# Patient Record
Sex: FEMALE | Race: WHITE | Employment: FULL TIME | URBAN - METROPOLITAN AREA
[De-identification: names, ages, dates, MRNs, and addresses within clinical notes are randomized per-mention and may not be internally consistent; named-entity substitution may affect disease eponyms.]

---

## 2017-04-14 ENCOUNTER — HOSPITAL ENCOUNTER (OUTPATIENT)
Dept: MAMMOGRAPHY | Age: 52
Discharge: HOME | End: 2017-04-14
Attending: OBSTETRICS & GYNECOLOGY
Payer: COMMERCIAL

## 2017-04-14 DIAGNOSIS — Z12.31 VISIT FOR SCREENING MAMMOGRAM: ICD-10-CM

## 2017-04-14 PROCEDURE — 77063 BREAST TOMOSYNTHESIS BI: CPT

## 2018-11-19 ENCOUNTER — HOSPITAL ENCOUNTER (OUTPATIENT)
Dept: MAMMOGRAPHY | Age: 53
Discharge: HOME | End: 2018-11-19
Attending: OBSTETRICS & GYNECOLOGY
Payer: COMMERCIAL

## 2018-11-19 DIAGNOSIS — Z12.39 SCREENING BREAST EXAMINATION: ICD-10-CM

## 2018-11-19 PROCEDURE — 77063 BREAST TOMOSYNTHESIS BI: CPT

## 2018-11-28 ENCOUNTER — HOSPITAL ENCOUNTER (OUTPATIENT)
Dept: MAMMOGRAPHY | Age: 53
Discharge: HOME | End: 2018-11-28
Attending: OBSTETRICS & GYNECOLOGY
Payer: COMMERCIAL

## 2018-11-28 DIAGNOSIS — R92.8 ABNORMAL MAMMOGRAM: ICD-10-CM

## 2018-11-28 PROCEDURE — 77065 DX MAMMO INCL CAD UNI: CPT

## 2018-11-28 PROCEDURE — 76642 ULTRASOUND BREAST LIMITED: CPT

## 2018-12-14 ENCOUNTER — HOSPITAL ENCOUNTER (OUTPATIENT)
Age: 53
Setting detail: OUTPATIENT SURGERY
Discharge: HOME | End: 2018-12-14
Attending: INTERNAL MEDICINE | Admitting: INTERNAL MEDICINE
Payer: COMMERCIAL

## 2018-12-14 ENCOUNTER — ANESTHESIA EVENT (OUTPATIENT)
Dept: ENDOSCOPY | Age: 53
End: 2018-12-14
Payer: COMMERCIAL

## 2018-12-14 ENCOUNTER — ANESTHESIA (OUTPATIENT)
Dept: ENDOSCOPY | Age: 53
End: 2018-12-14
Payer: COMMERCIAL

## 2018-12-14 VITALS
SYSTOLIC BLOOD PRESSURE: 110 MMHG | OXYGEN SATURATION: 100 % | RESPIRATION RATE: 18 BRPM | HEART RATE: 72 BPM | DIASTOLIC BLOOD PRESSURE: 70 MMHG

## 2018-12-14 PROCEDURE — 88305 TISSUE EXAM BY PATHOLOGIST: CPT

## 2018-12-14 PROCEDURE — 76040000019: Performed by: INTERNAL MEDICINE

## 2018-12-14 PROCEDURE — 77030013992 HC SNR POLYP ENDOSC BSC -B: Performed by: INTERNAL MEDICINE

## 2018-12-14 PROCEDURE — 77030027957 HC TBNG IRR ENDOGTR BUSS -B: Performed by: INTERNAL MEDICINE

## 2018-12-14 PROCEDURE — 74011250636 HC RX REV CODE- 250/636

## 2018-12-14 PROCEDURE — 76060000031 HC ANESTHESIA FIRST 0.5 HR: Performed by: INTERNAL MEDICINE

## 2018-12-14 RX ORDER — SODIUM CHLORIDE 0.9 % (FLUSH) 0.9 %
5-10 SYRINGE (ML) INJECTION AS NEEDED
Status: DISCONTINUED | OUTPATIENT
Start: 2018-12-14 | End: 2018-12-14 | Stop reason: HOSPADM

## 2018-12-14 RX ORDER — ALPRAZOLAM 0.25 MG/1
0.25 TABLET ORAL
COMMUNITY

## 2018-12-14 RX ORDER — MIDAZOLAM HYDROCHLORIDE 1 MG/ML
.25-1 INJECTION, SOLUTION INTRAMUSCULAR; INTRAVENOUS
Status: DISCONTINUED | OUTPATIENT
Start: 2018-12-14 | End: 2018-12-14 | Stop reason: HOSPADM

## 2018-12-14 RX ORDER — NALOXONE HYDROCHLORIDE 0.4 MG/ML
0.4 INJECTION, SOLUTION INTRAMUSCULAR; INTRAVENOUS; SUBCUTANEOUS
Status: DISCONTINUED | OUTPATIENT
Start: 2018-12-14 | End: 2018-12-14 | Stop reason: HOSPADM

## 2018-12-14 RX ORDER — FENTANYL CITRATE 50 UG/ML
100 INJECTION, SOLUTION INTRAMUSCULAR; INTRAVENOUS
Status: DISCONTINUED | OUTPATIENT
Start: 2018-12-14 | End: 2018-12-14 | Stop reason: HOSPADM

## 2018-12-14 RX ORDER — EPINEPHRINE 0.1 MG/ML
1 INJECTION INTRACARDIAC; INTRAVENOUS
Status: DISCONTINUED | OUTPATIENT
Start: 2018-12-14 | End: 2018-12-14 | Stop reason: HOSPADM

## 2018-12-14 RX ORDER — SODIUM CHLORIDE 9 MG/ML
50 INJECTION, SOLUTION INTRAVENOUS CONTINUOUS
Status: DISCONTINUED | OUTPATIENT
Start: 2018-12-14 | End: 2018-12-14 | Stop reason: HOSPADM

## 2018-12-14 RX ORDER — ASCORBIC ACID 250 MG
250 TABLET ORAL DAILY
COMMUNITY
End: 2019-05-29

## 2018-12-14 RX ORDER — PROPOFOL 10 MG/ML
INJECTION, EMULSION INTRAVENOUS AS NEEDED
Status: DISCONTINUED | OUTPATIENT
Start: 2018-12-14 | End: 2018-12-14 | Stop reason: HOSPADM

## 2018-12-14 RX ORDER — SODIUM CHLORIDE 0.9 % (FLUSH) 0.9 %
5-10 SYRINGE (ML) INJECTION EVERY 8 HOURS
Status: DISCONTINUED | OUTPATIENT
Start: 2018-12-14 | End: 2018-12-14 | Stop reason: HOSPADM

## 2018-12-14 RX ORDER — ATROPINE SULFATE 0.1 MG/ML
0.5 INJECTION INTRAVENOUS
Status: DISCONTINUED | OUTPATIENT
Start: 2018-12-14 | End: 2018-12-14 | Stop reason: HOSPADM

## 2018-12-14 RX ORDER — SODIUM CHLORIDE 9 MG/ML
INJECTION, SOLUTION INTRAVENOUS
Status: DISCONTINUED | OUTPATIENT
Start: 2018-12-14 | End: 2018-12-14 | Stop reason: HOSPADM

## 2018-12-14 RX ORDER — FLUMAZENIL 0.1 MG/ML
0.2 INJECTION INTRAVENOUS
Status: DISCONTINUED | OUTPATIENT
Start: 2018-12-14 | End: 2018-12-14 | Stop reason: HOSPADM

## 2018-12-14 RX ORDER — DEXTROMETHORPHAN/PSEUDOEPHED 2.5-7.5/.8
1.2 DROPS ORAL
Status: DISCONTINUED | OUTPATIENT
Start: 2018-12-14 | End: 2018-12-14 | Stop reason: HOSPADM

## 2018-12-14 RX ORDER — SPIRONOLACTONE 25 MG/1
TABLET ORAL DAILY
COMMUNITY

## 2018-12-14 RX ADMIN — PROPOFOL 50 MG: 10 INJECTION, EMULSION INTRAVENOUS at 16:26

## 2018-12-14 RX ADMIN — PROPOFOL 50 MG: 10 INJECTION, EMULSION INTRAVENOUS at 16:31

## 2018-12-14 RX ADMIN — PROPOFOL 50 MG: 10 INJECTION, EMULSION INTRAVENOUS at 16:29

## 2018-12-14 RX ADMIN — PROPOFOL 150 MG: 10 INJECTION, EMULSION INTRAVENOUS at 16:23

## 2018-12-14 RX ADMIN — SODIUM CHLORIDE: 9 INJECTION, SOLUTION INTRAVENOUS at 16:00

## 2018-12-14 NOTE — DISCHARGE INSTRUCTIONS
[facility].  217 [facility] [address], [address]  [address]  [id number]  1965    It was my pleasure seeing you for your procedure. You will also receive a summary report with the findings from this procedure and any further recommendations. If you had polyps removed or biopsies taken during your procedure, you will receive a separate letter from me within the next 2 weeks. If you don't receive this letter or if you have any questions, please call my office [phone]. Please take note of the post procedure instructions listed below. Bill Graves,    Dr. Miri Arroyo    These instructions give you information on caring for yourself after your procedure. Call your doctor if you have any problems or questions after your procedure. HOME CARE  · Walk if you have belly cramping or gas. Walking will help get rid of the air and reduce the bloated feeling in your belly (abdomen). · Your IV site (where you received drugs) may be tender to touch. Place warm towels on the site; keep your arm up on two pillows if you have any swelling or soreness in the area. · You may shower. ACTIVITY:  · Take frequent rest periods and move at a slower pace for the next 24 hours. .  · You may resume your regular activity tomorrow if you are feeling back to normal.  · Do not drive or ride a bicycle for at least 24 hours (because of the medicine (anesthesia) used during the test). · Do not sign any important legal documents or use or operate any machinery for 24 hours  · Do not take sleeping medicines/nerve drugs for 24 hours unless the doctor tells you. · You can return to work/school tomorrow unless otherwise instructed. NUTRITION:  · Drink plenty of fluids to keep your pee (urine) clear or pale yellow  · Begin with a light meal and progress to your normal diet.  Heavy or fried foods are harder to digest and may make you feel sick to your stomach (nauseated). · Once you are feeling back to normal, you may resume your normal diet as instructed by your doctor. · Avoid alcoholic beverages for 24 hours or as instructed. IF YOU HAD BIOPSIES TAKEN OR POLYPS REMOVED DURING THE PROCEDURE:  · For the next 7 days, avoid all non-steroidal antiinflammatory medications such as Ibuprofen, Motrin, Advil, Alleve, Kylie-seltzer, Goody's powder, BC powder. · If you do not have an heart condition that requires you to take a daily aspirin, you should avoid taking aspirin for 7 days. · Eat a soft diet for 24 hours. · Monitor your stools for any blood or dark black, tar-like, stools as this may be a sign of bleeding and if you see any blood, notify your doctor immediately. GET HELP RIGHT AWAY AND SEEK IMMEDIATE MEDICAL CARE IF:  · You have more than a spotting of blood in your stool. · You pass clumps of tissue (blood clots) or fill the toilet with blood. · Your belly is painfully swollen or puffy (abdominal distention). · You throw up (vomit). · You have a fever. · You have redness, pain or swelling at the IV site that last greater than two days. · You have abdominal pain or discomfort that is severe or gets worse throughout the day. Post-procedure diagnosis:  Colon Polyp (one) - removed    Post-procedure recommendations:  - Await pathology. You should receive a letter within 2 weeks.    - Resume normal medications.  - Recommend repeat colonoscopy in 5 years

## 2018-12-14 NOTE — PROCEDURES
118 [facility].  217 [facility] [address], [address]  [phone]                              Colonoscopy Procedure Note      Indications:  Screening, average risk. First procedure     :  Brian Calvert MD    Referring Provider: Reyes Sellers MD    Sedation:  MAC anesthesia    Procedure Details:  After informed consent was obtained with all risks and benefits of procedure explained and preoperative exam completed, the patient was taken to the endoscopy suite and placed in the left lateral decubitus position. Upon sequential sedation as per above, a digital rectal exam was performed per below. The Olympus videocolonoscope was inserted in the rectum and carefully advanced to the terminal ileum. The quality of preparation was good. [place] Bowel Prep Score : 3/3/3. The colonoscope was slowly withdrawn with careful evaluation between folds. Retroflexion in the rectum was performed. Findings:   Rectum: normal  Sigmoid: few small diverticula  Descending Colon: few small diverticula  Transverse Colon: normal  Ascending Colon: 8 mm flat polyp in the ascending colon, removed with cold snare  Cecum: normal  Terminal Ileum: normal    Interventions:  polypectomy    Specimen Removed:    ID Type Source Tests Collected by Time Destination   1 : ascending colon polyp Preservative Colon, Ascending  Alycia Goldman MD 12/14/2018 5257 Pathology       Complications: None. EBL:  Minimal    Impression:    See Postoperative diagnosis above    Recommendations:   - Await pathology. You should receive a letter within 2 weeks. - Resume normal medications.  - Recommend repeat colonoscopy in 5 years    Discharge Disposition:  Home in the company of a  when able to ambulate.     Brian Calvert MD  12/14/2018  4:48 PM

## 2018-12-14 NOTE — ANESTHESIA POSTPROCEDURE EVALUATION
Post-Anesthesia Evaluation and Assessment    Patient: Conchita Dickinson MRN: [id number]  SSN: [id number]    YOB: 1965  Age: 52 y.o. Sex: female      I have evaluated the patient and they are stable and ready for discharge from the PACU. Cardiovascular Function/Vital Signs  Visit Vitals  /70   Pulse 72   Resp 18   SpO2 100%   Breastfeeding? No       Patient is status post MAC anesthesia for Procedure(s):  COLONOSCOPY  ENDOSCOPIC POLYPECTOMY. Nausea/Vomiting: None    Postoperative hydration reviewed and adequate. Pain:  Pain Scale 1: Numeric (0 - 10) (12/14/18 1344)  Pain Intensity 1: 0 (12/14/18 1344)   Managed    Neurological Status: At baseline    Mental Status, Level of Consciousness: Alert and  oriented to person, place, and time    Pulmonary Status:   O2 Device: Room air (12/14/18 1710)   Adequate oxygenation and airway patent    Complications related to anesthesia: None    Post-anesthesia assessment completed. No concerns    Signed By: Rocio Saba MD     December 17, 2018              Procedure(s):  COLONOSCOPY  ENDOSCOPIC POLYPECTOMY. Anesthesia Post Evaluation        Patient location during evaluation: PACU  Patient participation: complete - patient participated  Level of consciousness: awake  Pain management: adequate  Airway patency: patent  Anesthetic complications: no  Cardiovascular status: hemodynamically stable  Respiratory status: acceptable  Hydration status: acceptable  Comments: I have seen and evaluated the patient. The patient is ready for PACU discharge. [address], DO                         Visit Vitals  /66   Pulse 73   Resp 18   SpO2 100%   Breastfeeding?  No

## 2019-03-25 ENCOUNTER — OFFICE VISIT (OUTPATIENT)
Dept: ONCOLOGY | Age: 54
End: 2019-03-25

## 2019-03-25 ENCOUNTER — DOCUMENTATION ONLY (OUTPATIENT)
Dept: ONCOLOGY | Age: 54
End: 2019-03-25

## 2019-03-25 VITALS
RESPIRATION RATE: 18 BRPM | WEIGHT: 142.8 LBS | HEIGHT: 66 IN | DIASTOLIC BLOOD PRESSURE: 84 MMHG | BODY MASS INDEX: 22.95 KG/M2 | OXYGEN SATURATION: 99 % | HEART RATE: 73 BPM | TEMPERATURE: 97.1 F | SYSTOLIC BLOOD PRESSURE: 127 MMHG

## 2019-03-25 DIAGNOSIS — C50.511 MALIGNANT NEOPLASM OF LOWER-OUTER QUADRANT OF RIGHT BREAST OF FEMALE, ESTROGEN RECEPTOR POSITIVE (HCC): Primary | ICD-10-CM

## 2019-03-25 DIAGNOSIS — Z17.0 MALIGNANT NEOPLASM OF LOWER-OUTER QUADRANT OF RIGHT BREAST OF FEMALE, ESTROGEN RECEPTOR POSITIVE (HCC): Primary | ICD-10-CM

## 2019-03-25 RX ORDER — LIDOCAINE AND PRILOCAINE 25; 25 MG/G; MG/G
CREAM TOPICAL AS NEEDED
Qty: 30 G | Refills: 0 | Status: ON HOLD | OUTPATIENT
Start: 2019-03-25 | End: 2019-07-19

## 2019-03-25 RX ORDER — PROCHLORPERAZINE MALEATE 10 MG
10 TABLET ORAL
Qty: 50 TAB | Refills: 5 | Status: ON HOLD | OUTPATIENT
Start: 2019-03-25 | End: 2019-07-19

## 2019-03-25 RX ORDER — ONDANSETRON HYDROCHLORIDE 8 MG/1
8 TABLET, FILM COATED ORAL
Qty: 24 TAB | Refills: 3 | Status: ON HOLD | OUTPATIENT
Start: 2019-03-25 | End: 2019-07-19

## 2019-03-25 NOTE — PROGRESS NOTES
3/25/19 5:45 PM: Provided patient with chemotherapy education packet. Packet included a handout on breast cancer diagnosis printed from cancer. net, a handout on Taxol/Herceptin chemotherapy regimen, a Common Side Effects of Chemotherapy handout, and an article on how to safely handle body secretions and waste after chemotherapy printed from [employer]. RN reviewed the packet with the patient and provided opportunity for questions and concerns.

## 2019-03-25 NOTE — PROGRESS NOTES
[facility] [place] [address] [facility] Nip: [phone]  F: [phone]      Reason for Visit:   Aldo Lincoln is a 52 y.o. female who is seen in consultation at the request of Dr. Lynsey Sellers for evaluation of systemic therapy for breast cancer. Consulting physician:  Dr. 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 FH:  Mother with breast cancer at age 45s and 46s; maternal aunt with breast cancer in her 46s; maternal aunt with breast cancer in her 46s; no ovarian, prostate, pancreas cancer    LMP 2018, spotty prior to that    Past Medical History:   Diagnosis Date    Adverse effect of anesthesia     difficulty awakening      Past Surgical History:   Procedure Laterality Date    BREAST SURGERY PROCEDURE UNLISTED Left     lumpectomy no lymph    COLONOSCOPY Left 2018    COLONOSCOPY performed by Nathaniel Valdovinos MD at [facility] ENDOSCOPY    HX ORTHOPAEDIC      foot surgery      Social History     Tobacco Use    Smoking status: Former Smoker     Packs/day: 1.00     Years: 20.00     Pack years: 20.00     Types: Cigarettes     Last attempt to quit: 2004     Years since quittin.9    Smokeless tobacco: Never Used   Substance Use Topics    Alcohol use:  Yes     Alcohol/week: 8.4 oz     Types: 14 Glasses of wine per week      Family History   Problem Relation Age of Onset    Breast Cancer Mother 42's 1st time late 52's 2nd time    Cancer Mother         breast CA x2    Hypertension Mother     Heart Disease Father     Cancer Maternal Aunt         breast    Cancer Maternal Aunt         breast    Cancer Maternal Cousin         Breast     Current Outpatient Medications   Medication Sig    TURMERIC PO Take 2 Caps by mouth daily.  spironolactone (ALDACTONE) 25 mg tablet Take  by mouth daily.  vit B Cmplx 3-FA-Vit C-Biotin (NEPHRO VINOD RX) 1- mg-mg-mcg tablet Take 1 Tab by mouth daily.  omega 3-dha-epa-fish oil (FISH OIL) 100-160-1,000 mg cap Take 1 Cap by mouth daily.  ascorbic acid, vitamin C, (VITAMIN C) 250 mg tablet Take 250 mg by mouth daily.  ALPRAZolam (XANAX) 0.25 mg tablet Take 0.25 mg by mouth. No current facility-administered medications for this visit. Allergies   Allergen Reactions    Penicillins Hives        Review of Systems: A complete review of systems was obtained, negative except as described above. Physical Exam:     Visit Vitals  /84   Pulse 73   Temp 97.1 °F (36.2 °C) (Temporal)   Resp 18   Ht 5' 6\" (1.676 m)   Wt 142 lb 12.8 oz (64.8 kg)   LMP 11/01/2018   SpO2 99%   BMI 23.05 kg/m²     ECOG PS: 0  General: No distress  Respiratory: Normal respiratory effort  CV: No peripheral edema  Skin: No rashes, ecchymoses, or petechiae  Psych: Alert, oriented, normal mood/affect      Results:   No results found for: WBC, HGB, HCT, PLT, MCV, ANEU, HGBPOC, HCTPOC, HGBEXT, HCTEXT, PLTEXT, HGBEXT, HCTEXT, PLTEXT  No results found for: NA, K, CL, CO2, GLU, BUN, CREA, GFRAA, GFRNA, CA, NAPOC, KPOCT, CLPOC, GLUCPOC, IBUN, CREAPOC, ICAI  No results found for: TBILI, ALT, SGOT, AP, TP, ALB, GLOB      Records reviewed and summarized above. Pathology report(s) reviewed above. Radiology report(s) reviewed above. Assessment/plan:   1. Right LOQ IDC, 1.4 cm, gr 2, 0/2 LN, ER +, VT +, HER 2 POSITIVE:  Stage IA (both anatomic and prognostic).   Likely postmenopausal    We explained to the patient that the goal of systemic adjuvant therapy is to improve the chances for cure and decrease the risk of relapse. We explained why a patient can have microscopic cancer spread now even though physical examination, laboratory studies and imaging studies are negative for cancer. We explained that the same treatments used now as adjuvant or preventive treatments rarely if ever are curative in women who develop metastases. Willy Adams suggests equivalency between q.3 week Adriamycin, Cytoxan followed by weekly paclitaxel and trastuzumab compared with the [facility] regimen. However, this study was not powered to show a difference between these two regimens, and in the [facility] publication, the AC-TH arm showed a numerically advantange (though not statistically significant) to the [facility] arm. In this patient, it is completely reasonable to use [facility] approach and avoid the potential cardiotoxicity of the anthracyclines as well as the potential for leukemia. We discussed the toxicities of docetaxel and carboplatin chemotherapy in detail. This chemotherapy frequently causes a low white blood cell count and hospital admissions for treatment of neutropenic fever. We explained that we consider the use of growth factors to minimize this risk. We explained to the patient that some side effects if they occur only last a few days including nausea, vomiting, stomatitis, arthralgia, myalgia,and allergic reactions to Taxotere. We told the patient that severe nausea and vomiting were uncommon and that some side effects,if they occur, will last longer; this includes hair loss, which will be seen in all patients treated with these agents and fatigue,which will be seen in most.  We also informed that for the patient that heart damage is rare with these agents. We explained that carboplatin can rarely cause kidney damage and high frequency hearing loss.   We provided the patient in detail her information concerning the toxicities of this regimen in addition to her overall discussion. Rationale for therapy with trastuzumab was also discussed with the patient including a 50% proportional improvement in disease free survival and also an improvement in overall survival in patients receiving trastuzumab and chemotherapy for HER-2 positive breast cancer. The side effects of trastuzumab were discussed including a 4%-5% risk of dropping her ejection fraction while on treatment and about a 1% risk of CHF. We discussed that this drug will be used every 3 weeks for remainder of a year following the chemotherapy cycles. We will check her EF before chemotherapy and every 3 months while she is receiving trastuzumab. · TCH (Trastuzumab 8mg/kg load with cycle 1 then 6mg/kg, Docetaxel 75mg/m2, Carboplatin AUC 6) given every 3 weeks x 6 cycles  · Labs: CBC, CMP prior to each treatment  · Antiemetic Prophylaxis: Palonosetron and dexamethasone prior to chemo  · PRN Antiemetics: Ondansetron, Compazine  · Swelling prophylaxis: Dexmethasone 8mg bid the day before, and day after chemotherapy  · TTE prior to chemotherapy and every 12 weeks while on Trastuzumab  · Neulasta 24-72 hours after each treatment    Also discussed the APT study results, weekly paclitaxel 80 mg/m2 x 12 with weekly trastuzumab (4 mg/kg load then 2 mg/kg)  followed by outback trastuzumab to complete one year. 42% were pT1c. I discussed the potential risks of paclitaxel chemotherapy with the patient. Major toxicities include nausea and vomiting, stomatitis, fatigue. We provided the patient with detailed information concerning toxicity including frequent toxicities that only last a few days, such as nausea, vomiting, mouth sores, arthralgia, myalgia, and potentially allergic reactions to paclitaxel, as well as toxicities which can be longer lasting including total alopecia, fatigue, anemia and neuropathy.  We provided the patient with detailed information concerning the toxicities of their regimen in addition to our verbal discussion. This is a reasonable regimen in this setting (only 4 distant recurrences over 7 years in this study, 7 year DFS was 95%)     After this discussion, she is agreeable to paclitaxel and trastuzumab as in APT, will start on 4/22. Will have her sign informed consent at the next visit. TTE ordered, she will need a port. The patient was given the following prescriptions with written and verbal instructions on how to use each:  Compazine, zofran,  a wig, and emla cream.      Discussed cold caps and cryotherapy with paclitaxel. She will not require XRT. Discussed endocrine therapy. The risks and benefits of tamoxifen were discussed in detail and the patient was informed of the following: Risks include a 1% risk of endometrial cancer for postmenopausal women treated for five years but no (or a minimally increased) risk in premenopausal women and that most women who develop tamoxifen-associated endometrial cancer can be cured. Any bleeding in a postmenopausal woman should be reported to a health care professional. There is also a 1% risk of blood clots (thromboembolism) that can be fatal. All patients irrespective of age who take tamoxifen and who have not had a hysterectomy should have a pelvic exam and Pap smear yearly. Tamoxifen increases the risk of cataract formation and on rare occasions has caused retinal damage: an eye exam is recommended yearly. Other risks include vaginal discharge or dryness, the development or worsening of hot flashes or vasomotor symptoms, and bone loss in premenopausal women. There is excellent evidence that tamoxifen does not increase risk of depression, cause weight gain or have a major effect on sexual function. Available data suggests little or no effect on cognitive function. Benefits include a lowering of cholesterol and a reduction in the rate of bone loss for postmenopausal woman. Any other symptoms should be reported. The risks and benefits of aromatase inhibitors (anastrozole, letrozole, and exemestane) were discussed in detail and the patient was informed of the following: Risks include the development of painful muscles and joints (arthralgia/myalgia) and bone loss. Muscle and joint pain can be severe but rarely result in any tissue damage; symptoms usually resolve in several weeks when the medication is stopped. Bone loss is common and a bone density test is recommended as a baseline and then yearly to every several years depending on initial results. The risk of fractures is increased by a few percent in patients taking these drugs, but careful monitoring of bone density and using bone protecting agents when indicated can minimize these risks. Unlike tamoxifen there is no increased risk of blood clots or endometrial cancer. AIs can cause or worsen vaginal dryness but women using these drugs should not use vaginal estrogen preparations for these symptoms. AIs can also cause or increase hot flashes. Any other symptoms should be reported. Will make a final decision on endocrine therapy following chemotherapy. May need to check LH, FSH, estradiol, though likely postmenopausal    Follow-up after early breast cancer was discussed. I recommend follow-up as defined by the American Society of Clinical Oncology and [facility]. This includes a visit to a health care professional every 3-6 months for 3 years, then every 6-12 months for 2 years, and then yearly as well as mammograms yearly. 2. Emotional well being:  She has excellent support and is coping well with her disease    > 80 min were spent with this patient with > 50% of that time spent in face to face counseling. I appreciate the opportunity to participate in Ms. Caity Moctezuma's care. Signed By: Paulino Brewster MD      No orders of the defined types were placed in this encounter.

## 2019-03-25 NOTE — PATIENT INSTRUCTIONS
Common Side Effects of Chemotherapy  Decreased Blood Counts Your blood counts can decrease temporarily due to chemotherapy, they will recover over time. This is an expected side effect that your Doctor will be monitoring.  - If you experience fevers (temperature >100.4°F), bleeding or unexplained bruising, please call the office right away   Risk of Infection Your white blood cells can decrease temporarily due to chemotherapy and can put you at higher risk of infection. Washing hands frequently with soap and avoiding sick contacts can reduce your risk of infection.  - If you experience fevers (temperature >100.4°F), shaking chills, or any signs of infection, please call the office immediately   Anemia Chemotherapy can cause your red blood cells to temporarily decrease; this is an expected side effect that your Doctor will be monitoring.  - You may experience fatigue if this occurs, please notify the office if you experience bleeding, shortness of breath with minimal exertion or at rest, rapid heartbeat, or feeling as though you may lose consciousness. Hair Loss Chemotherapy can affect your hair follicles and cause you to lose hair. This can occur on your scalp hair but also all over your body including eyebrows and eye lashes   Nausea  You have been prescribed nausea medication to take if needed. Please follow the directions given to you by your Doctor. - Please call the office if the medications you have been given are not relieving nausea. Vomiting Make sure you are taking anti-nausea medication as prescribed. Eating small amounts of bland foods frequently can help. - Please call the office right away if you are vomiting more than 4 times per day or are unable to keep down food or fluids   Diarrhea Eating small amounts of bland foods frequently can help, increase your fluid intake. It is usually ok to take Imodium for diarrhea.   - Please call the office right away if you experience more than 4 episodes of watery diarrhea or if you are feeling dehydrated. Female patients of childbearing age need to avoid pregnancy during chemotherapy. You can reach Medical Oncology at [address] [place] [address] with further questions or concerns at: [phone].  - Calls during normal business hours will reach our office.  - Calls after hours or on the weekend will reach an answering service and the on-call Oncologist will return your call. Prognosis: Good     This is our best current assessment. Cancers respond differently to treatment. Overall prognosis depends on many factors including other conditions, cancer stage, side effects, and other unforeseen events. Goal of therapy: Curative     Expected response to treatment:  Very good: Anticipate remission (no sign of cancer) and possible cancer cure    Treatment benefits and harms:  We discussed potential short term side effects to include:see handout    Long term side effects of treatment:  see handout    Quality of life: Quality of life concerns have been addressed. Treatment as outlined is expected to have minimal impact on patients quality of life.      Echo for heart    Prescriptions:  compazine; ondansetron; emla cream , wig

## 2019-03-26 ENCOUNTER — TELEPHONE (OUTPATIENT)
Dept: CARDIOLOGY CLINIC | Age: 54
End: 2019-03-26

## 2019-03-26 DIAGNOSIS — Z79.899 ENCOUNTER FOR MONITORING CARDIOTOXIC DRUG THERAPY: ICD-10-CM

## 2019-03-26 DIAGNOSIS — Z51.81 ENCOUNTER FOR MONITORING CARDIOTOXIC DRUG THERAPY: ICD-10-CM

## 2019-03-26 DIAGNOSIS — Z17.0 MALIGNANT NEOPLASM OF RIGHT BREAST IN FEMALE, ESTROGEN RECEPTOR POSITIVE, UNSPECIFIED SITE OF BREAST (HCC): Primary | ICD-10-CM

## 2019-03-26 DIAGNOSIS — C50.911 MALIGNANT NEOPLASM OF RIGHT BREAST IN FEMALE, ESTROGEN RECEPTOR POSITIVE, UNSPECIFIED SITE OF BREAST (HCC): Primary | ICD-10-CM

## 2019-03-26 NOTE — PROGRESS NOTES
Oncology Navigator Psychosocial AssessmentReason for Assessment:   
[]Depression  []Anxiety  []Caregiver [place]  []Maladaptive Coping with Serious Illness   [x]Other: newly dx: breast cancer Sources of Information:   
[x]Patient  [x]Family  [x]Staff  [x]Medical Record Advance Care Planning: No flowsheet data found. Mental Status:   
[x]Alert  []Lethargic  []Unresponsive Oriented to:  [x]Person  [x]Place  [x]Time  [x]Situation Barriers to Learning:   
[]Language  []Developmental  []Cognitive  []Altered Mental Status  []Visual/Hearing Impairment  []Unable to Read/Write  []Motivational   [x]No Barriers Identified  []Other: 
 
Relationship Status: 
[x]Single  []  []Significant Other/Life Partner  []  []  [] Living Circumstances: 
[x]Lives Alone  []Family/Significant Other in Household  []Roommates  []Children in the Home  []Paid Caregivers  []Assisted Living Facility/Group Home  []Skilled [address]  []Homeless  []Incarcerated  []Environmental/Care Concerns  []Other: 
 
Support System:   
[]Strong  [x]Fair  []Limited Financial/Legal Concerns:   
[]Uninsured  []Limited Income/Resources  []Non-Citizen  [x]No Concerns Identified  []Financial POA:   
[]Other: 
 
Latter-day/Spiritual/Existential: 
[]Strong Sense of Spirituality  []Involved in [facility] []Request  Visit  []Expressing Spiritual/Existential Angst  [x]No Concerns Identified Coping with Illness:       
 Patient: Family/Caregiver:  
Understanding and Acceptance of Illness/Prognosis  [x] [x] Strong Sense of Resilience [] []  
Self Reflection [] [] Engaged Support System [x] [] Does not Readily Discuss Illness [] [] Denial of Terminal Status [] [] Anger [] [] Depression [] [] Anxiety/Fear [] []  
Bargaining [] [] Recent Diagnosis/Prognosis [x] [] Difficulties with Body Image [] [] Loss of Identity [] [] Excessive Substance Use [] [] Mental Health History [] [] Enmeshed Relationships [] [] History of Loss [] [] Anticipatory Grief [] [] Concern for Complicated Grief [] [] Suicidal Ideation or Plan [] [] Unable to assess [] []  
 
            
Narrative: Met with patient to introduce social work navigator role and supports. Patient here with her niece, Donald Longo. Patient presents as pleasant and engaged. 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 Pt has limited local support. Nicole (sister) and Donald Longo (niece) both live out of state. Patient identified her cousin, Snehal Blevins as a local support. She tells me she also has neighbors and friends who are able to provide practical and emotional support. Offered active listening as patient ventilates feelings regarding diagnosis and treatment. She reports disappointment with needing chemotherapy but that she is \"onboard with what is recommended\". Patient is actively coping with diagnosis. Reviewed barriers to care and none identified at his time. Patient has transportation to appointment, short-term disability, health insurance. Provided information on supportive resources offered at [facility] and [facility]. Encouraged patient to contact me as needed. Referrals:  
 
I. Transportation Medicaid (Margarita Jones) [] [facility] [] Regional organization  [] Financial Assistance/Medication Access Patient assistance program (Care Card) [] Co-pay assistance  [] Leukemia & Lymphoma Society [] [address]  [] Patient [address] [] CancerCare  [] Emotional support Peer support group [] Local counseling [] Online support group [] Coordination of psychiatry consult [] Goals/Plan:  
Ongoing psychosocial support through counseling, assessment, and links to appropriate resources as needed. Thank you, Leoncio Ruth, MSW 
 
 This note will not be viewable in [address]

## 2019-03-28 RX ORDER — ALBUTEROL SULFATE 0.83 MG/ML
2.5 SOLUTION RESPIRATORY (INHALATION) AS NEEDED
Status: CANCELLED
Start: 2019-06-26

## 2019-03-28 RX ORDER — DIPHENHYDRAMINE HYDROCHLORIDE 50 MG/ML
50 INJECTION, SOLUTION INTRAMUSCULAR; INTRAVENOUS AS NEEDED
Status: CANCELLED
Start: 2019-05-29

## 2019-03-28 RX ORDER — ONDANSETRON 2 MG/ML
8 INJECTION INTRAMUSCULAR; INTRAVENOUS AS NEEDED
Status: CANCELLED | OUTPATIENT
Start: 2019-05-29

## 2019-03-28 RX ORDER — HEPARIN 100 UNIT/ML
300-500 SYRINGE INTRAVENOUS AS NEEDED
Status: CANCELLED
Start: 2019-04-22

## 2019-03-28 RX ORDER — DIPHENHYDRAMINE HYDROCHLORIDE 50 MG/ML
50 INJECTION, SOLUTION INTRAMUSCULAR; INTRAVENOUS AS NEEDED
Status: CANCELLED
Start: 2019-07-10

## 2019-03-28 RX ORDER — DEXAMETHASONE SODIUM PHOSPHATE 4 MG/ML
10 INJECTION, SOLUTION INTRA-ARTICULAR; INTRALESIONAL; INTRAMUSCULAR; INTRAVENOUS; SOFT TISSUE ONCE
Status: CANCELLED | OUTPATIENT
Start: 2019-07-10

## 2019-03-28 RX ORDER — SODIUM CHLORIDE 9 MG/ML
25 INJECTION, SOLUTION INTRAVENOUS CONTINUOUS
Status: CANCELLED | OUTPATIENT
Start: 2019-06-05

## 2019-03-28 RX ORDER — DIPHENHYDRAMINE HYDROCHLORIDE 50 MG/ML
50 INJECTION, SOLUTION INTRAMUSCULAR; INTRAVENOUS AS NEEDED
Status: CANCELLED
Start: 2019-05-22

## 2019-03-28 RX ORDER — SODIUM CHLORIDE 0.9 % (FLUSH) 0.9 %
10 SYRINGE (ML) INJECTION AS NEEDED
Status: CANCELLED
Start: 2019-06-05

## 2019-03-28 RX ORDER — EPINEPHRINE 1 MG/ML
0.3 INJECTION, SOLUTION, CONCENTRATE INTRAVENOUS AS NEEDED
Status: CANCELLED | OUTPATIENT
Start: 2019-04-22

## 2019-03-28 RX ORDER — DIPHENHYDRAMINE HYDROCHLORIDE 50 MG/ML
50 INJECTION, SOLUTION INTRAMUSCULAR; INTRAVENOUS AS NEEDED
Status: CANCELLED
Start: 2019-06-12

## 2019-03-28 RX ORDER — ACETAMINOPHEN 325 MG/1
650 TABLET ORAL AS NEEDED
Status: CANCELLED
Start: 2019-06-12

## 2019-03-28 RX ORDER — SODIUM CHLORIDE 0.9 % (FLUSH) 0.9 %
10 SYRINGE (ML) INJECTION AS NEEDED
Status: CANCELLED
Start: 2019-05-15

## 2019-03-28 RX ORDER — SODIUM CHLORIDE 9 MG/ML
10 INJECTION INTRAMUSCULAR; INTRAVENOUS; SUBCUTANEOUS AS NEEDED
Status: CANCELLED | OUTPATIENT
Start: 2019-05-22

## 2019-03-28 RX ORDER — HEPARIN 100 UNIT/ML
300-500 SYRINGE INTRAVENOUS AS NEEDED
Status: CANCELLED
Start: 2019-06-05

## 2019-03-28 RX ORDER — DEXAMETHASONE SODIUM PHOSPHATE 4 MG/ML
10 INJECTION, SOLUTION INTRA-ARTICULAR; INTRALESIONAL; INTRAMUSCULAR; INTRAVENOUS; SOFT TISSUE ONCE
Status: CANCELLED | OUTPATIENT
Start: 2019-05-29

## 2019-03-28 RX ORDER — HYDROCORTISONE SODIUM SUCCINATE 100 MG/2ML
100 INJECTION, POWDER, FOR SOLUTION INTRAMUSCULAR; INTRAVENOUS AS NEEDED
Status: CANCELLED | OUTPATIENT
Start: 2019-06-26

## 2019-03-28 RX ORDER — SODIUM CHLORIDE 9 MG/ML
25 INJECTION, SOLUTION INTRAVENOUS CONTINUOUS
Status: CANCELLED | OUTPATIENT
Start: 2019-05-22

## 2019-03-28 RX ORDER — SODIUM CHLORIDE 0.9 % (FLUSH) 0.9 %
10 SYRINGE (ML) INJECTION AS NEEDED
Status: CANCELLED
Start: 2019-07-03

## 2019-03-28 RX ORDER — EPINEPHRINE 1 MG/ML
0.3 INJECTION, SOLUTION, CONCENTRATE INTRAVENOUS AS NEEDED
Status: CANCELLED | OUTPATIENT
Start: 2019-06-05

## 2019-03-28 RX ORDER — EPINEPHRINE 1 MG/ML
0.3 INJECTION, SOLUTION, CONCENTRATE INTRAVENOUS AS NEEDED
Status: CANCELLED | OUTPATIENT
Start: 2019-06-12

## 2019-03-28 RX ORDER — SODIUM CHLORIDE 9 MG/ML
25 INJECTION, SOLUTION INTRAVENOUS CONTINUOUS
Status: CANCELLED | OUTPATIENT
Start: 2019-07-10

## 2019-03-28 RX ORDER — ONDANSETRON 2 MG/ML
8 INJECTION INTRAMUSCULAR; INTRAVENOUS AS NEEDED
Status: CANCELLED | OUTPATIENT
Start: 2019-06-05

## 2019-03-28 RX ORDER — SODIUM CHLORIDE 0.9 % (FLUSH) 0.9 %
10 SYRINGE (ML) INJECTION AS NEEDED
Status: CANCELLED
Start: 2019-05-29

## 2019-03-28 RX ORDER — DIPHENHYDRAMINE HYDROCHLORIDE 50 MG/ML
50 INJECTION, SOLUTION INTRAMUSCULAR; INTRAVENOUS ONCE
Status: CANCELLED
Start: 2019-05-08

## 2019-03-28 RX ORDER — ALBUTEROL SULFATE 0.83 MG/ML
2.5 SOLUTION RESPIRATORY (INHALATION) AS NEEDED
Status: CANCELLED
Start: 2019-05-08

## 2019-03-28 RX ORDER — DEXAMETHASONE SODIUM PHOSPHATE 4 MG/ML
10 INJECTION, SOLUTION INTRA-ARTICULAR; INTRALESIONAL; INTRAMUSCULAR; INTRAVENOUS; SOFT TISSUE ONCE
Status: CANCELLED | OUTPATIENT
Start: 2019-05-08

## 2019-03-28 RX ORDER — HYDROCORTISONE SODIUM SUCCINATE 100 MG/2ML
100 INJECTION, POWDER, FOR SOLUTION INTRAMUSCULAR; INTRAVENOUS AS NEEDED
Status: CANCELLED | OUTPATIENT
Start: 2019-05-15

## 2019-03-28 RX ORDER — EPINEPHRINE 1 MG/ML
0.3 INJECTION, SOLUTION, CONCENTRATE INTRAVENOUS AS NEEDED
Status: CANCELLED | OUTPATIENT
Start: 2019-06-19

## 2019-03-28 RX ORDER — SODIUM CHLORIDE 9 MG/ML
25 INJECTION, SOLUTION INTRAVENOUS CONTINUOUS
Status: CANCELLED | OUTPATIENT
Start: 2019-07-03

## 2019-03-28 RX ORDER — ONDANSETRON 2 MG/ML
8 INJECTION INTRAMUSCULAR; INTRAVENOUS AS NEEDED
Status: CANCELLED | OUTPATIENT
Start: 2019-06-12

## 2019-03-28 RX ORDER — SODIUM CHLORIDE 9 MG/ML
10 INJECTION INTRAMUSCULAR; INTRAVENOUS; SUBCUTANEOUS AS NEEDED
Status: CANCELLED | OUTPATIENT
Start: 2019-05-15

## 2019-03-28 RX ORDER — SODIUM CHLORIDE 0.9 % (FLUSH) 0.9 %
10 SYRINGE (ML) INJECTION AS NEEDED
Status: CANCELLED
Start: 2019-05-22

## 2019-03-28 RX ORDER — SODIUM CHLORIDE 9 MG/ML
10 INJECTION INTRAMUSCULAR; INTRAVENOUS; SUBCUTANEOUS AS NEEDED
Status: CANCELLED | OUTPATIENT
Start: 2019-05-08

## 2019-03-28 RX ORDER — DIPHENHYDRAMINE HYDROCHLORIDE 50 MG/ML
50 INJECTION, SOLUTION INTRAMUSCULAR; INTRAVENOUS AS NEEDED
Status: CANCELLED
Start: 2019-04-22

## 2019-03-28 RX ORDER — SODIUM CHLORIDE 0.9 % (FLUSH) 0.9 %
10 SYRINGE (ML) INJECTION AS NEEDED
Status: CANCELLED
Start: 2019-06-12

## 2019-03-28 RX ORDER — DEXAMETHASONE SODIUM PHOSPHATE 4 MG/ML
10 INJECTION, SOLUTION INTRA-ARTICULAR; INTRALESIONAL; INTRAMUSCULAR; INTRAVENOUS; SOFT TISSUE ONCE
Status: CANCELLED | OUTPATIENT
Start: 2019-06-12

## 2019-03-28 RX ORDER — ACETAMINOPHEN 325 MG/1
650 TABLET ORAL AS NEEDED
Status: CANCELLED
Start: 2019-07-03

## 2019-03-28 RX ORDER — DIPHENHYDRAMINE HYDROCHLORIDE 50 MG/ML
50 INJECTION, SOLUTION INTRAMUSCULAR; INTRAVENOUS ONCE
Status: CANCELLED
Start: 2019-05-29

## 2019-03-28 RX ORDER — ALBUTEROL SULFATE 0.83 MG/ML
2.5 SOLUTION RESPIRATORY (INHALATION) AS NEEDED
Status: CANCELLED
Start: 2019-07-10

## 2019-03-28 RX ORDER — DIPHENHYDRAMINE HYDROCHLORIDE 50 MG/ML
50 INJECTION, SOLUTION INTRAMUSCULAR; INTRAVENOUS AS NEEDED
Status: CANCELLED
Start: 2019-06-05

## 2019-03-28 RX ORDER — DIPHENHYDRAMINE HYDROCHLORIDE 50 MG/ML
50 INJECTION, SOLUTION INTRAMUSCULAR; INTRAVENOUS AS NEEDED
Status: CANCELLED
Start: 2019-07-03

## 2019-03-28 RX ORDER — ALBUTEROL SULFATE 0.83 MG/ML
2.5 SOLUTION RESPIRATORY (INHALATION) AS NEEDED
Status: CANCELLED
Start: 2019-05-15

## 2019-03-28 RX ORDER — DEXAMETHASONE SODIUM PHOSPHATE 4 MG/ML
10 INJECTION, SOLUTION INTRA-ARTICULAR; INTRALESIONAL; INTRAMUSCULAR; INTRAVENOUS; SOFT TISSUE ONCE
Status: CANCELLED | OUTPATIENT
Start: 2019-06-05

## 2019-03-28 RX ORDER — DIPHENHYDRAMINE HYDROCHLORIDE 50 MG/ML
50 INJECTION, SOLUTION INTRAMUSCULAR; INTRAVENOUS AS NEEDED
Status: CANCELLED
Start: 2019-05-08

## 2019-03-28 RX ORDER — EPINEPHRINE 1 MG/ML
0.3 INJECTION, SOLUTION, CONCENTRATE INTRAVENOUS AS NEEDED
Status: CANCELLED | OUTPATIENT
Start: 2019-05-22

## 2019-03-28 RX ORDER — ALBUTEROL SULFATE 0.83 MG/ML
2.5 SOLUTION RESPIRATORY (INHALATION) AS NEEDED
Status: CANCELLED
Start: 2019-05-01

## 2019-03-28 RX ORDER — HEPARIN 100 UNIT/ML
300-500 SYRINGE INTRAVENOUS AS NEEDED
Status: CANCELLED
Start: 2019-07-10

## 2019-03-28 RX ORDER — SODIUM CHLORIDE 9 MG/ML
10 INJECTION INTRAMUSCULAR; INTRAVENOUS; SUBCUTANEOUS AS NEEDED
Status: CANCELLED | OUTPATIENT
Start: 2019-06-26

## 2019-03-28 RX ORDER — SODIUM CHLORIDE 9 MG/ML
25 INJECTION, SOLUTION INTRAVENOUS CONTINUOUS
Status: CANCELLED | OUTPATIENT
Start: 2019-05-15

## 2019-03-28 RX ORDER — DIPHENHYDRAMINE HYDROCHLORIDE 50 MG/ML
50 INJECTION, SOLUTION INTRAMUSCULAR; INTRAVENOUS AS NEEDED
Status: CANCELLED
Start: 2019-06-19

## 2019-03-28 RX ORDER — EPINEPHRINE 1 MG/ML
0.3 INJECTION, SOLUTION, CONCENTRATE INTRAVENOUS AS NEEDED
Status: CANCELLED | OUTPATIENT
Start: 2019-07-10

## 2019-03-28 RX ORDER — SODIUM CHLORIDE 9 MG/ML
10 INJECTION INTRAMUSCULAR; INTRAVENOUS; SUBCUTANEOUS AS NEEDED
Status: CANCELLED | OUTPATIENT
Start: 2019-06-05

## 2019-03-28 RX ORDER — HYDROCORTISONE SODIUM SUCCINATE 100 MG/2ML
100 INJECTION, POWDER, FOR SOLUTION INTRAMUSCULAR; INTRAVENOUS AS NEEDED
Status: CANCELLED | OUTPATIENT
Start: 2019-07-03

## 2019-03-28 RX ORDER — HYDROCORTISONE SODIUM SUCCINATE 100 MG/2ML
100 INJECTION, POWDER, FOR SOLUTION INTRAMUSCULAR; INTRAVENOUS AS NEEDED
Status: CANCELLED | OUTPATIENT
Start: 2019-07-10

## 2019-03-28 RX ORDER — DIPHENHYDRAMINE HYDROCHLORIDE 50 MG/ML
50 INJECTION, SOLUTION INTRAMUSCULAR; INTRAVENOUS AS NEEDED
Status: CANCELLED
Start: 2019-06-26

## 2019-03-28 RX ORDER — HYDROCORTISONE SODIUM SUCCINATE 100 MG/2ML
100 INJECTION, POWDER, FOR SOLUTION INTRAMUSCULAR; INTRAVENOUS AS NEEDED
Status: CANCELLED | OUTPATIENT
Start: 2019-05-08

## 2019-03-28 RX ORDER — SODIUM CHLORIDE 9 MG/ML
10 INJECTION INTRAMUSCULAR; INTRAVENOUS; SUBCUTANEOUS AS NEEDED
Status: CANCELLED | OUTPATIENT
Start: 2019-06-12

## 2019-03-28 RX ORDER — ONDANSETRON 2 MG/ML
8 INJECTION INTRAMUSCULAR; INTRAVENOUS AS NEEDED
Status: CANCELLED | OUTPATIENT
Start: 2019-04-22

## 2019-03-28 RX ORDER — ALBUTEROL SULFATE 0.83 MG/ML
2.5 SOLUTION RESPIRATORY (INHALATION) AS NEEDED
Status: CANCELLED
Start: 2019-05-22

## 2019-03-28 RX ORDER — DEXAMETHASONE SODIUM PHOSPHATE 4 MG/ML
10 INJECTION, SOLUTION INTRA-ARTICULAR; INTRALESIONAL; INTRAMUSCULAR; INTRAVENOUS; SOFT TISSUE ONCE
Status: CANCELLED | OUTPATIENT
Start: 2019-05-15

## 2019-03-28 RX ORDER — ONDANSETRON 2 MG/ML
8 INJECTION INTRAMUSCULAR; INTRAVENOUS AS NEEDED
Status: CANCELLED | OUTPATIENT
Start: 2019-05-01

## 2019-03-28 RX ORDER — HYDROCORTISONE SODIUM SUCCINATE 100 MG/2ML
100 INJECTION, POWDER, FOR SOLUTION INTRAMUSCULAR; INTRAVENOUS AS NEEDED
Status: CANCELLED | OUTPATIENT
Start: 2019-06-05

## 2019-03-28 RX ORDER — ACETAMINOPHEN 325 MG/1
650 TABLET ORAL AS NEEDED
Status: CANCELLED
Start: 2019-06-19

## 2019-03-28 RX ORDER — ALBUTEROL SULFATE 0.83 MG/ML
2.5 SOLUTION RESPIRATORY (INHALATION) AS NEEDED
Status: CANCELLED
Start: 2019-06-12

## 2019-03-28 RX ORDER — ACETAMINOPHEN 325 MG/1
650 TABLET ORAL AS NEEDED
Status: CANCELLED
Start: 2019-05-15

## 2019-03-28 RX ORDER — HYDROCORTISONE SODIUM SUCCINATE 100 MG/2ML
100 INJECTION, POWDER, FOR SOLUTION INTRAMUSCULAR; INTRAVENOUS AS NEEDED
Status: CANCELLED | OUTPATIENT
Start: 2019-06-19

## 2019-03-28 RX ORDER — HEPARIN 100 UNIT/ML
300-500 SYRINGE INTRAVENOUS AS NEEDED
Status: CANCELLED
Start: 2019-07-03

## 2019-03-28 RX ORDER — DEXAMETHASONE SODIUM PHOSPHATE 4 MG/ML
10 INJECTION, SOLUTION INTRA-ARTICULAR; INTRALESIONAL; INTRAMUSCULAR; INTRAVENOUS; SOFT TISSUE ONCE
Status: CANCELLED | OUTPATIENT
Start: 2019-07-03

## 2019-03-28 RX ORDER — SODIUM CHLORIDE 9 MG/ML
10 INJECTION INTRAMUSCULAR; INTRAVENOUS; SUBCUTANEOUS AS NEEDED
Status: CANCELLED | OUTPATIENT
Start: 2019-06-19

## 2019-03-28 RX ORDER — DEXAMETHASONE SODIUM PHOSPHATE 4 MG/ML
10 INJECTION, SOLUTION INTRA-ARTICULAR; INTRALESIONAL; INTRAMUSCULAR; INTRAVENOUS; SOFT TISSUE ONCE
Status: CANCELLED | OUTPATIENT
Start: 2019-05-01

## 2019-03-28 RX ORDER — EPINEPHRINE 1 MG/ML
0.3 INJECTION, SOLUTION, CONCENTRATE INTRAVENOUS AS NEEDED
Status: CANCELLED | OUTPATIENT
Start: 2019-05-08

## 2019-03-28 RX ORDER — DEXAMETHASONE SODIUM PHOSPHATE 4 MG/ML
10 INJECTION, SOLUTION INTRA-ARTICULAR; INTRALESIONAL; INTRAMUSCULAR; INTRAVENOUS; SOFT TISSUE ONCE
Status: CANCELLED | OUTPATIENT
Start: 2019-06-26

## 2019-03-28 RX ORDER — EPINEPHRINE 1 MG/ML
0.3 INJECTION, SOLUTION, CONCENTRATE INTRAVENOUS AS NEEDED
Status: CANCELLED | OUTPATIENT
Start: 2019-05-01

## 2019-03-28 RX ORDER — ONDANSETRON 2 MG/ML
8 INJECTION INTRAMUSCULAR; INTRAVENOUS AS NEEDED
Status: CANCELLED | OUTPATIENT
Start: 2019-06-19

## 2019-03-28 RX ORDER — ALBUTEROL SULFATE 0.83 MG/ML
2.5 SOLUTION RESPIRATORY (INHALATION) AS NEEDED
Status: CANCELLED
Start: 2019-05-29

## 2019-03-28 RX ORDER — ALBUTEROL SULFATE 0.83 MG/ML
2.5 SOLUTION RESPIRATORY (INHALATION) AS NEEDED
Status: CANCELLED
Start: 2019-06-19

## 2019-03-28 RX ORDER — SODIUM CHLORIDE 9 MG/ML
10 INJECTION INTRAMUSCULAR; INTRAVENOUS; SUBCUTANEOUS AS NEEDED
Status: CANCELLED | OUTPATIENT
Start: 2019-04-22

## 2019-03-28 RX ORDER — DIPHENHYDRAMINE HYDROCHLORIDE 50 MG/ML
50 INJECTION, SOLUTION INTRAMUSCULAR; INTRAVENOUS ONCE
Status: CANCELLED
Start: 2019-07-10

## 2019-03-28 RX ORDER — ACETAMINOPHEN 325 MG/1
650 TABLET ORAL AS NEEDED
Status: CANCELLED
Start: 2019-06-05

## 2019-03-28 RX ORDER — DIPHENHYDRAMINE HYDROCHLORIDE 50 MG/ML
50 INJECTION, SOLUTION INTRAMUSCULAR; INTRAVENOUS AS NEEDED
Status: CANCELLED
Start: 2019-05-15

## 2019-03-28 RX ORDER — HEPARIN 100 UNIT/ML
300-500 SYRINGE INTRAVENOUS AS NEEDED
Status: CANCELLED
Start: 2019-05-08

## 2019-03-28 RX ORDER — ONDANSETRON 2 MG/ML
8 INJECTION INTRAMUSCULAR; INTRAVENOUS AS NEEDED
Status: CANCELLED | OUTPATIENT
Start: 2019-07-03

## 2019-03-28 RX ORDER — HYDROCORTISONE SODIUM SUCCINATE 100 MG/2ML
100 INJECTION, POWDER, FOR SOLUTION INTRAMUSCULAR; INTRAVENOUS AS NEEDED
Status: CANCELLED | OUTPATIENT
Start: 2019-05-29

## 2019-03-28 RX ORDER — DIPHENHYDRAMINE HYDROCHLORIDE 50 MG/ML
50 INJECTION, SOLUTION INTRAMUSCULAR; INTRAVENOUS ONCE
Status: CANCELLED
Start: 2019-05-15

## 2019-03-28 RX ORDER — EPINEPHRINE 1 MG/ML
0.3 INJECTION, SOLUTION, CONCENTRATE INTRAVENOUS AS NEEDED
Status: CANCELLED | OUTPATIENT
Start: 2019-07-03

## 2019-03-28 RX ORDER — ONDANSETRON 2 MG/ML
8 INJECTION INTRAMUSCULAR; INTRAVENOUS AS NEEDED
Status: CANCELLED | OUTPATIENT
Start: 2019-07-10

## 2019-03-28 RX ORDER — EPINEPHRINE 1 MG/ML
0.3 INJECTION, SOLUTION, CONCENTRATE INTRAVENOUS AS NEEDED
Status: CANCELLED | OUTPATIENT
Start: 2019-05-15

## 2019-03-28 RX ORDER — ONDANSETRON 2 MG/ML
8 INJECTION INTRAMUSCULAR; INTRAVENOUS AS NEEDED
Status: CANCELLED | OUTPATIENT
Start: 2019-05-22

## 2019-03-28 RX ORDER — SODIUM CHLORIDE 9 MG/ML
10 INJECTION INTRAMUSCULAR; INTRAVENOUS; SUBCUTANEOUS AS NEEDED
Status: CANCELLED | OUTPATIENT
Start: 2019-05-29

## 2019-03-28 RX ORDER — ONDANSETRON 2 MG/ML
8 INJECTION INTRAMUSCULAR; INTRAVENOUS AS NEEDED
Status: CANCELLED | OUTPATIENT
Start: 2019-06-26

## 2019-03-28 RX ORDER — HEPARIN 100 UNIT/ML
300-500 SYRINGE INTRAVENOUS AS NEEDED
Status: CANCELLED
Start: 2019-05-29

## 2019-03-28 RX ORDER — HEPARIN 100 UNIT/ML
300-500 SYRINGE INTRAVENOUS AS NEEDED
Status: CANCELLED
Start: 2019-06-19

## 2019-03-28 RX ORDER — SODIUM CHLORIDE 9 MG/ML
25 INJECTION, SOLUTION INTRAVENOUS CONTINUOUS
Status: CANCELLED | OUTPATIENT
Start: 2019-06-19

## 2019-03-28 RX ORDER — SODIUM CHLORIDE 9 MG/ML
25 INJECTION, SOLUTION INTRAVENOUS CONTINUOUS
Status: CANCELLED | OUTPATIENT
Start: 2019-05-08

## 2019-03-28 RX ORDER — DEXAMETHASONE SODIUM PHOSPHATE 4 MG/ML
10 INJECTION, SOLUTION INTRA-ARTICULAR; INTRALESIONAL; INTRAMUSCULAR; INTRAVENOUS; SOFT TISSUE ONCE
Status: CANCELLED | OUTPATIENT
Start: 2019-04-22

## 2019-03-28 RX ORDER — DIPHENHYDRAMINE HYDROCHLORIDE 50 MG/ML
50 INJECTION, SOLUTION INTRAMUSCULAR; INTRAVENOUS ONCE
Status: CANCELLED
Start: 2019-05-01

## 2019-03-28 RX ORDER — SODIUM CHLORIDE 9 MG/ML
25 INJECTION, SOLUTION INTRAVENOUS CONTINUOUS
Status: CANCELLED | OUTPATIENT
Start: 2019-06-12

## 2019-03-28 RX ORDER — DIPHENHYDRAMINE HYDROCHLORIDE 50 MG/ML
50 INJECTION, SOLUTION INTRAMUSCULAR; INTRAVENOUS AS NEEDED
Status: CANCELLED
Start: 2019-05-01

## 2019-03-28 RX ORDER — DIPHENHYDRAMINE HYDROCHLORIDE 50 MG/ML
50 INJECTION, SOLUTION INTRAMUSCULAR; INTRAVENOUS ONCE
Status: CANCELLED
Start: 2019-06-12

## 2019-03-28 RX ORDER — DIPHENHYDRAMINE HYDROCHLORIDE 50 MG/ML
50 INJECTION, SOLUTION INTRAMUSCULAR; INTRAVENOUS ONCE
Status: CANCELLED
Start: 2019-06-05

## 2019-03-28 RX ORDER — EPINEPHRINE 1 MG/ML
0.3 INJECTION, SOLUTION, CONCENTRATE INTRAVENOUS AS NEEDED
Status: CANCELLED | OUTPATIENT
Start: 2019-06-26

## 2019-03-28 RX ORDER — ACETAMINOPHEN 325 MG/1
650 TABLET ORAL AS NEEDED
Status: CANCELLED
Start: 2019-07-10

## 2019-03-28 RX ORDER — ALBUTEROL SULFATE 0.83 MG/ML
2.5 SOLUTION RESPIRATORY (INHALATION) AS NEEDED
Status: CANCELLED
Start: 2019-07-03

## 2019-03-28 RX ORDER — ACETAMINOPHEN 325 MG/1
650 TABLET ORAL AS NEEDED
Status: CANCELLED
Start: 2019-06-26

## 2019-03-28 RX ORDER — HYDROCORTISONE SODIUM SUCCINATE 100 MG/2ML
100 INJECTION, POWDER, FOR SOLUTION INTRAMUSCULAR; INTRAVENOUS AS NEEDED
Status: CANCELLED | OUTPATIENT
Start: 2019-04-22

## 2019-03-28 RX ORDER — DIPHENHYDRAMINE HYDROCHLORIDE 50 MG/ML
50 INJECTION, SOLUTION INTRAMUSCULAR; INTRAVENOUS ONCE
Status: CANCELLED
Start: 2019-04-22

## 2019-03-28 RX ORDER — DEXAMETHASONE SODIUM PHOSPHATE 4 MG/ML
10 INJECTION, SOLUTION INTRA-ARTICULAR; INTRALESIONAL; INTRAMUSCULAR; INTRAVENOUS; SOFT TISSUE ONCE
Status: CANCELLED | OUTPATIENT
Start: 2019-05-22

## 2019-03-28 RX ORDER — SODIUM CHLORIDE 9 MG/ML
10 INJECTION INTRAMUSCULAR; INTRAVENOUS; SUBCUTANEOUS AS NEEDED
Status: CANCELLED | OUTPATIENT
Start: 2019-07-03

## 2019-03-28 RX ORDER — ONDANSETRON 2 MG/ML
8 INJECTION INTRAMUSCULAR; INTRAVENOUS AS NEEDED
Status: CANCELLED | OUTPATIENT
Start: 2019-05-15

## 2019-03-28 RX ORDER — HYDROCORTISONE SODIUM SUCCINATE 100 MG/2ML
100 INJECTION, POWDER, FOR SOLUTION INTRAMUSCULAR; INTRAVENOUS AS NEEDED
Status: CANCELLED | OUTPATIENT
Start: 2019-05-22

## 2019-03-28 RX ORDER — SODIUM CHLORIDE 0.9 % (FLUSH) 0.9 %
10 SYRINGE (ML) INJECTION AS NEEDED
Status: CANCELLED
Start: 2019-06-19

## 2019-03-28 RX ORDER — HEPARIN 100 UNIT/ML
300-500 SYRINGE INTRAVENOUS AS NEEDED
Status: CANCELLED
Start: 2019-06-12

## 2019-03-28 RX ORDER — SODIUM CHLORIDE 0.9 % (FLUSH) 0.9 %
10 SYRINGE (ML) INJECTION AS NEEDED
Status: CANCELLED
Start: 2019-04-22

## 2019-03-28 RX ORDER — HEPARIN 100 UNIT/ML
300-500 SYRINGE INTRAVENOUS AS NEEDED
Status: CANCELLED
Start: 2019-05-22

## 2019-03-28 RX ORDER — SODIUM CHLORIDE 0.9 % (FLUSH) 0.9 %
10 SYRINGE (ML) INJECTION AS NEEDED
Status: CANCELLED
Start: 2019-07-10

## 2019-03-28 RX ORDER — ALBUTEROL SULFATE 0.83 MG/ML
2.5 SOLUTION RESPIRATORY (INHALATION) AS NEEDED
Status: CANCELLED
Start: 2019-06-05

## 2019-03-28 RX ORDER — DIPHENHYDRAMINE HYDROCHLORIDE 50 MG/ML
50 INJECTION, SOLUTION INTRAMUSCULAR; INTRAVENOUS ONCE
Status: CANCELLED
Start: 2019-05-22

## 2019-03-28 RX ORDER — SODIUM CHLORIDE 9 MG/ML
10 INJECTION INTRAMUSCULAR; INTRAVENOUS; SUBCUTANEOUS AS NEEDED
Status: CANCELLED | OUTPATIENT
Start: 2019-07-10

## 2019-03-28 RX ORDER — DEXAMETHASONE SODIUM PHOSPHATE 4 MG/ML
10 INJECTION, SOLUTION INTRA-ARTICULAR; INTRALESIONAL; INTRAMUSCULAR; INTRAVENOUS; SOFT TISSUE ONCE
Status: CANCELLED | OUTPATIENT
Start: 2019-06-19

## 2019-03-28 RX ORDER — HEPARIN 100 UNIT/ML
300-500 SYRINGE INTRAVENOUS AS NEEDED
Status: CANCELLED
Start: 2019-05-15

## 2019-03-28 RX ORDER — ACETAMINOPHEN 325 MG/1
650 TABLET ORAL AS NEEDED
Status: CANCELLED
Start: 2019-05-01

## 2019-03-28 RX ORDER — DIPHENHYDRAMINE HYDROCHLORIDE 50 MG/ML
50 INJECTION, SOLUTION INTRAMUSCULAR; INTRAVENOUS ONCE
Status: CANCELLED
Start: 2019-06-26

## 2019-03-28 RX ORDER — HYDROCORTISONE SODIUM SUCCINATE 100 MG/2ML
100 INJECTION, POWDER, FOR SOLUTION INTRAMUSCULAR; INTRAVENOUS AS NEEDED
Status: CANCELLED | OUTPATIENT
Start: 2019-06-12

## 2019-03-28 RX ORDER — HEPARIN 100 UNIT/ML
300-500 SYRINGE INTRAVENOUS AS NEEDED
Status: CANCELLED
Start: 2019-05-01

## 2019-03-28 RX ORDER — DIPHENHYDRAMINE HYDROCHLORIDE 50 MG/ML
50 INJECTION, SOLUTION INTRAMUSCULAR; INTRAVENOUS ONCE
Status: CANCELLED
Start: 2019-07-03

## 2019-03-28 RX ORDER — SODIUM CHLORIDE 9 MG/ML
25 INJECTION, SOLUTION INTRAVENOUS CONTINUOUS
Status: CANCELLED | OUTPATIENT
Start: 2019-05-01

## 2019-03-28 RX ORDER — ACETAMINOPHEN 325 MG/1
650 TABLET ORAL AS NEEDED
Status: CANCELLED
Start: 2019-04-22

## 2019-03-28 RX ORDER — HYDROCORTISONE SODIUM SUCCINATE 100 MG/2ML
100 INJECTION, POWDER, FOR SOLUTION INTRAMUSCULAR; INTRAVENOUS AS NEEDED
Status: CANCELLED | OUTPATIENT
Start: 2019-05-01

## 2019-03-28 RX ORDER — SODIUM CHLORIDE 9 MG/ML
25 INJECTION, SOLUTION INTRAVENOUS CONTINUOUS
Status: CANCELLED | OUTPATIENT
Start: 2019-06-26

## 2019-03-28 RX ORDER — HEPARIN 100 UNIT/ML
300-500 SYRINGE INTRAVENOUS AS NEEDED
Status: CANCELLED
Start: 2019-06-26

## 2019-03-28 RX ORDER — ALBUTEROL SULFATE 0.83 MG/ML
2.5 SOLUTION RESPIRATORY (INHALATION) AS NEEDED
Status: CANCELLED
Start: 2019-04-22

## 2019-03-28 RX ORDER — ACETAMINOPHEN 325 MG/1
650 TABLET ORAL AS NEEDED
Status: CANCELLED
Start: 2019-05-08

## 2019-03-28 RX ORDER — ACETAMINOPHEN 325 MG/1
650 TABLET ORAL AS NEEDED
Status: CANCELLED
Start: 2019-05-22

## 2019-03-28 RX ORDER — SODIUM CHLORIDE 9 MG/ML
10 INJECTION INTRAMUSCULAR; INTRAVENOUS; SUBCUTANEOUS AS NEEDED
Status: CANCELLED | OUTPATIENT
Start: 2019-05-01

## 2019-03-28 RX ORDER — EPINEPHRINE 1 MG/ML
0.3 INJECTION, SOLUTION, CONCENTRATE INTRAVENOUS AS NEEDED
Status: CANCELLED | OUTPATIENT
Start: 2019-05-29

## 2019-03-28 RX ORDER — SODIUM CHLORIDE 0.9 % (FLUSH) 0.9 %
10 SYRINGE (ML) INJECTION AS NEEDED
Status: CANCELLED
Start: 2019-05-01

## 2019-03-28 RX ORDER — DIPHENHYDRAMINE HYDROCHLORIDE 50 MG/ML
50 INJECTION, SOLUTION INTRAMUSCULAR; INTRAVENOUS ONCE
Status: CANCELLED
Start: 2019-06-19

## 2019-03-28 RX ORDER — SODIUM CHLORIDE 9 MG/ML
25 INJECTION, SOLUTION INTRAVENOUS CONTINUOUS
Status: CANCELLED | OUTPATIENT
Start: 2019-05-29

## 2019-03-28 RX ORDER — SODIUM CHLORIDE 9 MG/ML
25 INJECTION, SOLUTION INTRAVENOUS CONTINUOUS
Status: CANCELLED | OUTPATIENT
Start: 2019-04-22

## 2019-03-28 RX ORDER — ONDANSETRON 2 MG/ML
8 INJECTION INTRAMUSCULAR; INTRAVENOUS AS NEEDED
Status: CANCELLED | OUTPATIENT
Start: 2019-05-08

## 2019-03-28 RX ORDER — ACETAMINOPHEN 325 MG/1
650 TABLET ORAL AS NEEDED
Status: CANCELLED
Start: 2019-05-29

## 2019-03-28 RX ORDER — SODIUM CHLORIDE 0.9 % (FLUSH) 0.9 %
10 SYRINGE (ML) INJECTION AS NEEDED
Status: CANCELLED
Start: 2019-05-08

## 2019-03-28 RX ORDER — SODIUM CHLORIDE 0.9 % (FLUSH) 0.9 %
10 SYRINGE (ML) INJECTION AS NEEDED
Status: CANCELLED
Start: 2019-06-26

## 2019-03-29 DIAGNOSIS — C50.919 MALIGNANT NEOPLASM OF BREAST IN FEMALE, ESTROGEN RECEPTOR POSITIVE, UNSPECIFIED LATERALITY, UNSPECIFIED SITE OF BREAST (HCC): Primary | ICD-10-CM

## 2019-03-29 DIAGNOSIS — Z17.0 MALIGNANT NEOPLASM OF BREAST IN FEMALE, ESTROGEN RECEPTOR POSITIVE, UNSPECIFIED LATERALITY, UNSPECIFIED SITE OF BREAST (HCC): Primary | ICD-10-CM

## 2019-04-18 ENCOUNTER — HOSPITAL ENCOUNTER (OUTPATIENT)
Dept: INTERVENTIONAL RADIOLOGY/VASCULAR | Age: 54
Discharge: HOME | End: 2019-04-18
Attending: NURSE PRACTITIONER | Admitting: RADIOLOGY
Payer: COMMERCIAL

## 2019-04-18 VITALS
HEART RATE: 69 BPM | SYSTOLIC BLOOD PRESSURE: 118 MMHG | HEIGHT: 66 IN | WEIGHT: 143.74 LBS | OXYGEN SATURATION: 98 % | TEMPERATURE: 98.2 F | RESPIRATION RATE: 16 BRPM | BODY MASS INDEX: 23.1 KG/M2 | DIASTOLIC BLOOD PRESSURE: 83 MMHG

## 2019-04-18 DIAGNOSIS — Z17.0 MALIGNANT NEOPLASM OF BREAST IN FEMALE, ESTROGEN RECEPTOR POSITIVE, UNSPECIFIED LATERALITY, UNSPECIFIED SITE OF BREAST (HCC): ICD-10-CM

## 2019-04-18 DIAGNOSIS — C50.919 MALIGNANT NEOPLASM OF BREAST IN FEMALE, ESTROGEN RECEPTOR POSITIVE, UNSPECIFIED LATERALITY, UNSPECIFIED SITE OF BREAST (HCC): ICD-10-CM

## 2019-04-18 LAB
BASOPHILS # BLD: 0 K/UL (ref 0–0.1)
BASOPHILS NFR BLD: 1 % (ref 0–1)
DIFFERENTIAL METHOD BLD: NORMAL
EOSINOPHIL # BLD: 0.2 K/UL (ref 0–0.4)
EOSINOPHIL NFR BLD: 2 % (ref 0–7)
ERYTHROCYTE [DISTWIDTH] IN BLOOD BY AUTOMATED COUNT: 13.2 % (ref 11.5–14.5)
HCT VFR BLD AUTO: 37.2 % (ref 35–47)
HGB BLD-MCNC: 12.3 G/DL (ref 11.5–16)
IMM GRANULOCYTES # BLD AUTO: 0 K/UL (ref 0–0.04)
IMM GRANULOCYTES NFR BLD AUTO: 0 % (ref 0–0.5)
LYMPHOCYTES # BLD: 2.3 K/UL (ref 0.8–3.5)
LYMPHOCYTES NFR BLD: 32 % (ref 12–49)
MCH RBC QN AUTO: 31.1 PG (ref 26–34)
MCHC RBC AUTO-ENTMCNC: 33.1 G/DL (ref 30–36.5)
MCV RBC AUTO: 94.2 FL (ref 80–99)
MONOCYTES # BLD: 0.5 K/UL (ref 0–1)
MONOCYTES NFR BLD: 6 % (ref 5–13)
NEUTS SEG # BLD: 4.2 K/UL (ref 1.8–8)
NEUTS SEG NFR BLD: 59 % (ref 32–75)
NRBC # BLD: 0 K/UL (ref 0–0.01)
NRBC BLD-RTO: 0 PER 100 WBC
PLATELET # BLD AUTO: 277 K/UL (ref 150–400)
PMV BLD AUTO: 9.3 FL (ref 8.9–12.9)
RBC # BLD AUTO: 3.95 M/UL (ref 3.8–5.2)
WBC # BLD AUTO: 7.1 K/UL (ref 3.6–11)

## 2019-04-18 PROCEDURE — 77030039266 HC ADH SKN EXOFIN S2SG -A: Performed by: RADIOLOGY

## 2019-04-18 PROCEDURE — 74011250636 HC RX REV CODE- 250/636: Performed by: NURSE PRACTITIONER

## 2019-04-18 PROCEDURE — 85025 COMPLETE CBC W/AUTO DIFF WBC: CPT

## 2019-04-18 PROCEDURE — C1894 INTRO/SHEATH, NON-LASER: HCPCS | Performed by: RADIOLOGY

## 2019-04-18 PROCEDURE — 36415 COLL VENOUS BLD VENIPUNCTURE: CPT

## 2019-04-18 PROCEDURE — 74011250636 HC RX REV CODE- 250/636

## 2019-04-18 PROCEDURE — 77030012935 HC DRSG AQUACEL BMS -B: Performed by: RADIOLOGY

## 2019-04-18 PROCEDURE — 76937 US GUIDE VASCULAR ACCESS: CPT

## 2019-04-18 PROCEDURE — 77030031139 HC SUT VCRL2 J&J -A: Performed by: RADIOLOGY

## 2019-04-18 PROCEDURE — 74011000250 HC RX REV CODE- 250: Performed by: RADIOLOGY

## 2019-04-18 PROCEDURE — 74011250636 HC RX REV CODE- 250/636: Performed by: RADIOLOGY

## 2019-04-18 PROCEDURE — 99152 MOD SED SAME PHYS/QHP 5/>YRS: CPT | Performed by: RADIOLOGY

## 2019-04-18 PROCEDURE — C1788 PORT, INDWELLING, IMP: HCPCS | Performed by: RADIOLOGY

## 2019-04-18 PROCEDURE — 36561 INSERT TUNNELED CV CATH: CPT

## 2019-04-18 RX ORDER — CEFAZOLIN SODIUM/WATER 2 G/20 ML
2 SYRINGE (ML) INTRAVENOUS ONCE
Status: CANCELLED | OUTPATIENT
Start: 2019-04-18 | End: 2019-04-18

## 2019-04-18 RX ORDER — DIPHENHYDRAMINE HYDROCHLORIDE 50 MG/ML
25-50 INJECTION, SOLUTION INTRAMUSCULAR; INTRAVENOUS ONCE
Status: COMPLETED | OUTPATIENT
Start: 2019-04-18 | End: 2019-04-18

## 2019-04-18 RX ORDER — FENTANYL CITRATE 50 UG/ML
25-200 INJECTION, SOLUTION INTRAMUSCULAR; INTRAVENOUS
Status: DISCONTINUED | OUTPATIENT
Start: 2019-04-18 | End: 2019-04-18 | Stop reason: HOSPADM

## 2019-04-18 RX ORDER — HEPARIN SODIUM 200 [USP'U]/100ML
500 INJECTION, SOLUTION INTRAVENOUS ONCE
Status: COMPLETED | OUTPATIENT
Start: 2019-04-18 | End: 2019-04-18

## 2019-04-18 RX ORDER — MIDAZOLAM HYDROCHLORIDE 1 MG/ML
.5-1 INJECTION, SOLUTION INTRAMUSCULAR; INTRAVENOUS
Status: DISCONTINUED | OUTPATIENT
Start: 2019-04-18 | End: 2019-04-18 | Stop reason: HOSPADM

## 2019-04-18 RX ORDER — LIDOCAINE HYDROCHLORIDE AND EPINEPHRINE 10; 10 MG/ML; UG/ML
1.5 INJECTION, SOLUTION INFILTRATION; PERINEURAL
Status: DISCONTINUED | OUTPATIENT
Start: 2019-04-18 | End: 2019-04-18 | Stop reason: HOSPADM

## 2019-04-18 RX ORDER — LIDOCAINE HYDROCHLORIDE 10 MG/ML
10-30 INJECTION INFILTRATION; PERINEURAL
Status: DISCONTINUED | OUTPATIENT
Start: 2019-04-18 | End: 2019-04-18 | Stop reason: HOSPADM

## 2019-04-18 RX ADMIN — MIDAZOLAM HYDROCHLORIDE 2 MG: 1 INJECTION, SOLUTION INTRAMUSCULAR; INTRAVENOUS at 10:52

## 2019-04-18 RX ADMIN — MIDAZOLAM HYDROCHLORIDE 1 MG: 1 INJECTION, SOLUTION INTRAMUSCULAR; INTRAVENOUS at 10:57

## 2019-04-18 RX ADMIN — DIPHENHYDRAMINE HYDROCHLORIDE 50 MG: 50 INJECTION, SOLUTION INTRAMUSCULAR; INTRAVENOUS at 10:27

## 2019-04-18 RX ADMIN — LIDOCAINE HYDROCHLORIDE 10 ML: 10 INJECTION, SOLUTION INFILTRATION; PERINEURAL at 10:52

## 2019-04-18 RX ADMIN — HEPARIN SODIUM 1000 UNITS: 200 INJECTION, SOLUTION INTRAVENOUS at 10:28

## 2019-04-18 RX ADMIN — LIDOCAINE HYDROCHLORIDE,EPINEPHRINE BITARTRATE 15 MG: 10; .01 INJECTION, SOLUTION INFILTRATION; PERINEURAL at 10:52

## 2019-04-18 RX ADMIN — FENTANYL CITRATE 25 MCG: 50 INJECTION, SOLUTION INTRAMUSCULAR; INTRAVENOUS at 10:53

## 2019-04-18 RX ADMIN — FENTANYL CITRATE 50 MCG: 50 INJECTION, SOLUTION INTRAMUSCULAR; INTRAVENOUS at 10:39

## 2019-04-18 RX ADMIN — CEFAZOLIN 0.2 G: 1 INJECTION, POWDER, FOR SOLUTION INTRAMUSCULAR; INTRAVENOUS; PARENTERAL at 10:30

## 2019-04-18 RX ADMIN — FENTANYL CITRATE 25 MCG: 50 INJECTION, SOLUTION INTRAMUSCULAR; INTRAVENOUS at 10:56

## 2019-04-18 NOTE — Clinical Note
TRANSFER - OUT REPORT:  
 
Verbal report given to: ranjith. Report consisted of patient's Situation, Background, Assessment and  
Recommendations(SBAR). Opportunity for questions and clarification was provided. Patient transported with a Registered Nurse. Patient transported to: [facility].

## 2019-04-18 NOTE — DISCHARGE INSTRUCTIONS
Implanted Port: What to Expect at [facility] have had a procedure to implant a port. The port looks like a small bump under your skin. A thin, flexible tube called a catheter runs under the skin from the port into a large vein. You may have the port for weeks, months, or longer. You will be able to get medicine, blood, nutrients, or other fluids with more comfort. The port can be used right away. You will probably have some discomfort and bruising at the port site. This will go away in a few days. You may have strips of tape on the cut (incision) the doctor made, or the cut may have been closed with glue. It may be covered with a small bandage. This care sheet gives you a general idea about how long it will take for you to recover. But each person recovers at a different pace. Follow the steps below to feel better as quickly as possible. How can you care for yourself at home? Activity  · Avoid arm and upper body movements that may pull on the catheter. These movements include heavy weight lifting and vigorous use of your arms. · You will probably need to take 1 day off from work and will be able to return to normal activities shortly after. This depends on the type of work you do, why you have the catheter, and how you feel. · Ask your doctor when you can drive again. Pay special attention when pulling your seat belt across your chest so it doesn't pull out the catheter. It's okay if the seat belt lays over the catheter. Medicines  · Take pain medicines exactly as directed. ¨ If the doctor gave you a prescription medicine for pain, take it as prescribed. ¨ If you are not taking a prescription pain medicine, ask your doctor if you can take an over-the-counter medicine. · If you think your pain medicine is making you sick to your stomach:  ¨ Take your medicine after meals (unless your doctor has told you not to). ¨ Ask your doctor for a different pain medicine.   Incision care  · If you have a bandage, your doctor will tell you when you can remove it. After you remove the bandage, you may shower. Wash the area with soap and water and pat it dry. Don't use hydrogen peroxide or alcohol, which can slow healing. You may cover the area with a gauze bandage if it weeps or rubs against clothing. Change the bandage every day. · If you have strips of tape on the cut (incision) the doctor made, leave the tape on for a week or until it falls off. Other instructions  · Always carry the medical alert card that your doctor gives you. It contains information about your port. It will tell health care workers you have a port in case you need emergency care. · Wear loose clothing over the port for the first 10 to 14 days. When getting dressed, be careful not to rub the port. Follow-up care is a key part of your treatment and safety. Be sure to make and go to all appointments, and call your doctor if you are having problems. It's also a good idea to know your test results and keep a list of the medicines you take. When should you call for help? Call 911 anytime you think you may need emergency care. For example, call if:  · You passed out (lost consciousness). · You have severe trouble breathing. · You have sudden chest pain and shortness of breath, or you cough up blood. Call your doctor now or seek immediate medical care if:  · You have signs of infection, such as:  ¨ Increased pain, swelling, warmth, or redness near the port. ¨ Red streaks leading from the port. ¨ Pus draining from the port. ¨ A fever. · You have pain or swelling in your neck or arm. · You have trouble breathing or chest pain. Watch closely for changes in your health, and be sure to contact your doctor if:  · You have any problems with your port. Where can you learn more? Go to [email or web address].be  Enter M256 in the search box to learn more about \"Implanted Port: What to Expect at Home. \"   © 3792-1569 Healthwise, Incorporated. Care instructions adapted under license by [facility] (which disclaims liability or warranty for this information). This care instruction is for use with your licensed healthcare professional. If you have questions about a medical condition or this instruction, always ask your healthcare professional. [address] any warranty or liability for your use of this information.   Content Version: 23.7.479117; Current as of: June 4, 2014

## 2019-04-18 NOTE — PROGRESS NOTES
[facility] [place] [facility]  [address] [facility] [address] [place]: [phone]  F: [phone]      Reason for Visit:   Vibha Wilson is a 52 y.o. female who is seen in consultation at the request of Dr. Neol Lewis for evaluation of systemic therapy for breast cancer. Consulting physician:  Dr. 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 Interval history:  In today for follow up. Complains of gr 1 hot flashes, gr 1 insomnia, 6/10 pain to port site.     FH:  Mother with breast cancer at age 45s and 46s; maternal aunt with breast cancer in her 46s; maternal aunt with breast cancer in her 46s; no ovarian, prostate, pancreas cancer    LMP 11/2018, spotty prior to that    Past Medical History:   Diagnosis Date    Adverse effect of anesthesia 1995    difficulty awakening      Past Surgical History:   Procedure Laterality Date    BREAST SURGERY PROCEDURE UNLISTED Left 1997    lumpectomy no lymph    COLONOSCOPY Left 12/14/2018    COLONOS[facility]en [facility]berg[facility] at [facility] ENDOSCOPY    HX ORTHOPAEDIC      foot surgery    IR INSERT TUNL CVC W PORT OVER 5 YEARS  4/18/2019      Social History     Tobacco Use    Smoking status: Former Smoker     Packs/day: 1.00     Years: 20.00     Pack years: 20.00     Types: Cigarettes     Last attempt to quit: 04/2004     Years since quitting: 15.0    Smokeless tobacco: Never Used   Substance Use Topics    Alcohol use: Yes     Alcohol/week: 8.4 oz     Types: 14 Glasses of wine per week      Family History   Problem Relation Age of Onset    Breast Cancer Mother         42's 1st time late 52's 2nd time   [place] Cancer Mother         breast CA x2    Hypertension Mother     Heart Disease Father     Cancer Maternal Aunt         breast    Cancer Maternal Aunt         breast    Cancer Maternal Cousin         Breast     Current Outpatient Medications   Medication Sig    TURMERIC PO Take 2 Caps by mouth daily.  prochlorperazine (COMPAZINE) 10 mg tablet Take 1 Tab by mouth every six (6) hours as needed for Nausea.  lidocaine-prilocaine (EMLA) topical cream Apply  to affected area as needed for Pain.  ondansetron hcl (ZOFRAN) 8 mg tablet Take 1 Tab by mouth every twelve (12) hours as needed for Nausea.  ALPRAZolam (XANAX) 0.25 mg tablet Take 0.25 mg by mouth.  spironolactone (ALDACTONE) 25 mg tablet Take  by mouth daily.  vit B Cmplx 3-FA-Vit C-Biotin (NEPHRO VINOD RX) 1- mg-mg-mcg tablet Take 1 Tab by mouth daily.  omega 3-dha-epa-fish oil (FISH OIL) 100-160-1,000 mg cap Take 1 Cap by mouth daily.  ascorbic acid, vitamin C, (VITAMIN C) 250 mg tablet Take 250 mg by mouth daily. No current facility-administered medications for this visit. Allergies   Allergen Reactions    Ancef [Cefazolin] Hives    Penicillins Hives        Review of Systems: A complete review of systems was obtained, negative except as described above.     Physical Exam:     Visit Vitals  /75 (BP 1 Location: Right arm, BP Patient Position: Sitting)   Pulse 86   Temp 97.8 °F (36.6 °C) (Temporal)   Resp 20   Ht 5' 6\" (1.676 m)   Wt 144 lb (65.3 kg)   SpO2 100%   BMI 23.24 kg/m²     ECOG PS: 0  General: No distress  Eyes: PERRLA, anicteric sclerae  HENT: Atraumatic, OP clear  Neck: Supple  Lymphatic: No cervical, supraclavicular, or inguinal adenopathy  Respiratory: CTAB, normal respiratory effort  CV: Normal rate, regular rhythm, no murmurs, no peripheral edema  GI: Soft, nontender, nondistended, no masses, no hepatomegaly, no splenomegaly  MS: Normal gait and station. Digits without clubbing or cyanosis. Skin: No rashes, ecchymoses, or petechiae. Normal temperature, turgor, and texture. Psych: Alert, oriented, appropriate affect, normal judgment/insight        Results:     Lab Results   Component Value Date/Time    WBC 6.1 04/22/2019 10:00 AM    HGB 12.8 04/22/2019 10:00 AM    HCT 38.9 04/22/2019 10:00 AM    PLATELET 072 30/89/6376 10:00 AM    MCV 93.3 04/22/2019 10:00 AM    ABS. NEUTROPHILS 3.0 04/22/2019 10:00 AM     Lab Results   Component Value Date/Time    Sodium 141 04/22/2019 10:00 AM    Potassium 4.5 04/22/2019 10:00 AM    Chloride 105 04/22/2019 10:00 AM    CO2 29 04/22/2019 10:00 AM    Glucose 89 04/22/2019 10:00 AM    BUN 16 04/22/2019 10:00 AM    Creatinine 0.97 04/22/2019 10:00 AM    GFR est AA >60 04/22/2019 10:00 AM    GFR est non-AA 60 (L) 04/22/2019 10:00 AM    Calcium 9.5 04/22/2019 10:00 AM     Lab Results   Component Value Date/Time    Bilirubin, total 0.8 04/22/2019 10:00 AM    ALT (SGPT) 50 04/22/2019 10:00 AM    AST (SGOT) 36 04/22/2019 10:00 AM    Alk. phosphatase 45 04/22/2019 10:00 AM    Protein, total 6.7 04/22/2019 10:00 AM    Albumin 4.1 04/22/2019 10:00 AM    Globulin 2.6 04/22/2019 10:00 AM         Records reviewed and summarized above. Pathology report(s) reviewed above. Radiology report(s) reviewed above. Assessment/plan:   1. Right LOQ IDC, 1.4 cm, gr 2, 0/2 LN, ER +, CT +, HER 2 POSITIVE:  Stage IA (both anatomic and prognostic). Likely postmenopausal    We explained to the patient that the goal of systemic adjuvant therapy is to improve the chances for cure and decrease the risk of relapse.  We explained why a patient can have microscopic cancer spread now even though physical examination, laboratory studies and imaging studies are negative for cancer. We explained that the same treatments used now as adjuvant or preventive treatments rarely if ever are curative in women who develop metastases. Annette Sunshine suggests equivalency between q.3 week Adriamycin, Cytoxan followed by weekly paclitaxel and trastuzumab compared with the [facility] regimen. However, this study was not powered to show a difference between these two regimens, and in the [facility] publication, the AC-TH arm showed a numerically advantange (though not statistically significant) to the [facility] arm. In this patient, it is completely reasonable to use [facility] approach and avoid the potential cardiotoxicity of the anthracyclines as well as the potential for leukemia. We discussed the toxicities of docetaxel and carboplatin chemotherapy in detail. This chemotherapy frequently causes a low white blood cell count and hospital admissions for treatment of neutropenic fever. We explained that we consider the use of growth factors to minimize this risk. We explained to the patient that some side effects if they occur only last a few days including nausea, vomiting, stomatitis, arthralgia, myalgia,and allergic reactions to Taxotere. We told the patient that severe nausea and vomiting were uncommon and that some side effects,if they occur, will last longer; this includes hair loss, which will be seen in all patients treated with these agents and fatigue,which will be seen in most.  We also informed that for the patient that heart damage is rare with these agents. We explained that carboplatin can rarely cause kidney damage and high frequency hearing loss. We provided the patient in detail her information concerning the toxicities of this regimen in addition to her overall discussion.       Rationale for therapy with trastuzumab was also discussed with the patient including a 50% proportional improvement in disease free survival and also an improvement in overall survival in patients receiving trastuzumab and chemotherapy for HER-2 positive breast cancer. The side effects of trastuzumab were discussed including a 4%-5% risk of dropping her ejection fraction while on treatment and about a 1% risk of CHF. We discussed that this drug will be used every 3 weeks for remainder of a year following the chemotherapy cycles. We will check her EF before chemotherapy and every 3 months while she is receiving trastuzumab. · TCH (Trastuzumab 8mg/kg load with cycle 1 then 6mg/kg, Docetaxel 75mg/m2, Carboplatin AUC 6) given every 3 weeks x 6 cycles  · Labs: CBC, CMP prior to each treatment  · Antiemetic Prophylaxis: Palonosetron and dexamethasone prior to chemo  · PRN Antiemetics: Ondansetron, Compazine  · Swelling prophylaxis: Dexmethasone 8mg bid the day before, and day after chemotherapy  · TTE prior to chemotherapy and every 12 weeks while on Trastuzumab  · Neulasta 24-72 hours after each treatment    Also discussed the APT study results, weekly paclitaxel 80 mg/m2 x 12 with weekly trastuzumab (4 mg/kg load then 2 mg/kg)  followed by outback trastuzumab to complete one year. 42% were pT1c. I discussed the potential risks of paclitaxel chemotherapy with the patient. Major toxicities include nausea and vomiting, stomatitis, fatigue. We provided the patient with detailed information concerning toxicity including frequent toxicities that only last a few days, such as nausea, vomiting, mouth sores, arthralgia, myalgia, and potentially allergic reactions to paclitaxel, as well as toxicities which can be longer lasting including total alopecia, fatigue, anemia and neuropathy. We provided the patient with detailed information concerning the toxicities of their regimen in addition to our verbal discussion. This is a reasonable regimen in this setting (only 4 distant recurrences over 7 years in this study, 7 year DFS was 95%)     After this discussion, she is agreeable to paclitaxel and trastuzumab as in APT, will start on 4/22. She has signed informed consent. TTE on 4/11/19, EF 64%. Port was placed. [address] today. The patient was given the following prescriptions with written and verbal instructions on how to use each:  Compazine, zofran,  a wig, and emla cream.      Discussed cold caps and cryotherapy with paclitaxel. She will not require XRT. Discussed endocrine therapy. The risks and benefits of tamoxifen were discussed in detail and the patient was informed of the following: Risks include a 1% risk of endometrial cancer for postmenopausal women treated for five years but no (or a minimally increased) risk in premenopausal women and that most women who develop tamoxifen-associated endometrial cancer can be cured. Any bleeding in a postmenopausal woman should be reported to a health care professional. There is also a 1% risk of blood clots (thromboembolism) that can be fatal. All patients irrespective of age who take tamoxifen and who have not had a hysterectomy should have a pelvic exam and Pap smear yearly. Tamoxifen increases the risk of cataract formation and on rare occasions has caused retinal damage: an eye exam is recommended yearly. Other risks include vaginal discharge or dryness, the development or worsening of hot flashes or vasomotor symptoms, and bone loss in premenopausal women. There is excellent evidence that tamoxifen does not increase risk of depression, cause weight gain or have a major effect on sexual function. Available data suggests little or no effect on cognitive function. Benefits include a lowering of cholesterol and a reduction in the rate of bone loss for postmenopausal woman. Any other symptoms should be reported. The risks and benefits of aromatase inhibitors (anastrozole, letrozole, and exemestane) were discussed in detail and the patient was informed of the following: Risks include the development of painful muscles and joints (arthralgia/myalgia) and bone loss.  Muscle and joint pain can be severe but rarely result in any tissue damage; symptoms usually resolve in several weeks when the medication is stopped. Bone loss is common and a bone density test is recommended as a baseline and then yearly to every several years depending on initial results. The risk of fractures is increased by a few percent in patients taking these drugs, but careful monitoring of bone density and using bone protecting agents when indicated can minimize these risks. Unlike tamoxifen there is no increased risk of blood clots or endometrial cancer. AIs can cause or worsen vaginal dryness but women using these drugs should not use vaginal estrogen preparations for these symptoms. AIs can also cause or increase hot flashes. Any other symptoms should be reported. Will make a final decision on endocrine therapy following chemotherapy. May need to check LH, FSH, estradiol, though likely postmenopausal    Follow-up after early breast cancer was discussed. I recommend follow-up as defined by the American Society of Clinical Oncology and [employer]. This includes a visit to a health care professional every 3-6 months for 3 years, then every 6-12 months for 2 years, and then yearly as well as mammograms yearly. 2. Emotional well being:  She has excellent support and is coping well with her disease. Her sister is present with her at this visit. > 25 minutes were spent with this patient with > 50% of that time spent in face to face counseling. I appreciate the opportunity to participate in Ms. Eveline Moctezuma's care. Signed By: Osmel Reddy MD      No orders of the defined types were placed in this encounter.

## 2019-04-18 NOTE — PROGRESS NOTES
3680 Received patient from waiting area. Armband and allergies verbally confirmed with patient. Procedure explained and consents signed. Dr. Dick Lunsford in to see, rsiks and benefits explained   1015 TRANSFER - OUT REPORT:    Verbal report given to Kun(name) on Campos Blunt  being transferred to angio lab(unit) for routine progression of care       Report consisted of patients Situation, Background, Assessment and   Recommendations(SBAR). Information from the following report(s) Procedure Summary was reviewed with the receiving nurse. Lines:   Peripheral IV 04/18/19 Left Antecubital (Active)   Site Assessment Clean, dry, & intact 4/18/2019  9:04 AM        Opportunity for questions and clarification was provided. Patient transported with:   Registered Nurse   1115 TRANSFER - IN REPORT:    Verbal report received from Kun(name) on Campos Blunt  being received from angio(unit) for routine progression of care      Report consisted of patients Situation, Background, Assessment and   Recommendations(SBAR). Information from the following report(s) Procedure Summary was reviewed with the receiving nurse. Opportunity for questions and clarification was provided. Assessment completed upon patients arrival to unit and care assumed. Dressing right side of chest dry in tact pt awake alert   1200 discharge instructions reviewed with pt and friend on the phone copy of discharge instructions given to pt alond with port booklet and card  1215 iv removed  After ambulating to restroom preparing for discharge  1216 Copy of printed discharge instructions given to patient and  Friend, Patient escorted via wheelchair to entrance. friend driving. Patient discharged into care of friend. Nadya Samayoa

## 2019-04-18 NOTE — Clinical Note
TRANSFER - IN REPORT:  
 
Verbal report received from: Terrell. Report consisted of patient's Situation, Background, Assessment and  
Recommendations(SBAR). Opportunity for questions and clarification was provided. Assessment completed upon patient's arrival to unit and care assumed. Patient transported with a Registered Nurse.

## 2019-04-18 NOTE — H&P
Radiology History and Physical    Patient: Rahul Mosley 52 y.o. female       Chief Complaint: No chief complaint on file. History of Present Illness: PORT PLACEMENT    History:    Past Medical History:   Diagnosis Date    Adverse effect of anesthesia 1995    difficulty awakening     Family History   Problem Relation Age of Onset   [address] [facility] Breast Cancer Mother         42's 1st time late 52's 2nd time   [address] [facility] Cancer Mother         breast CA x2    Hypertension Mother     Heart Disease Father     Cancer Maternal Aunt         breast    Cancer Maternal Aunt         breast    Cancer Maternal Cousin         Breast     Social History     Socioeconomic History    Marital status: SINGLE     Spouse name: Not on file    Number of children: Not on file    Years of education: Not on file    Highest education level: Not on file   Occupational History    Not on file   Social Needs    Financial resource strain: Not on file    Food insecurity:     Worry: Not on file     Inability: Not on file    Transportation needs:     Medical: Not on file     Non-medical: Not on file   Tobacco Use    Smoking status: Former Smoker     Packs/day: 1.00     Years: 20.00     Pack years: 20.00     Types: Cigarettes     Last attempt to quit: 04/2004     Years since quitting: 15.0    Smokeless tobacco: Never Used   Substance and Sexual Activity    Alcohol use:  Yes     Alcohol/week: 8.4 oz     Types: 14 Glasses of wine per week    Drug use: No    Sexual activity: Not on file   Lifestyle    Physical activity:     Days per week: Not on file     Minutes per session: Not on file    Stress: Not on file   Relationships    Social connections:     Talks on phone: Not on file     Gets together: Not on file     Attends Zoroastrianism service: Not on file     Active member of club or organization: Not on file     Attends meetings of clubs or organizations: Not on file     Relationship status: Not on file    Intimate partner violence:     Fear of current or ex partner: Not on file     Emotionally abused: Not on file     Physically abused: Not on file     Forced sexual activity: Not on file   Other Topics Concern    Not on file   Social History Narrative    Not on file       Allergies: Allergies   Allergen Reactions    Penicillins Hives       Current Medications:  No current facility-administered medications for this encounter. Physical Exam:  Blood pressure 128/83, pulse 72, temperature 98.2 °F (36.8 °C), resp. rate 16, height 5' 6\" (1.676 m), weight 65.2 kg (143 lb 11.8 oz), SpO2 97 %. GENERAL: alert, cooperative, no distress, appears stated age, LUNG: clear to auscultation bilaterally, HEART: regular rate and rhythm, S1, S2 normal, no murmur, click, rub or gallop      Alerts:    Hospital Problems  Date Reviewed: 3/25/2019    None          Laboratory:    No results for input(s): HGB, HCT, WBC, PLT, INR, BUN, CREA, K, CRCLT, HGBEXT, HCTEXT, PLTEXT in the last 72 hours. No lab exists for component: PTT, PT, INREXT      Plan of Care/Planned Procedure:  Risks, benefits, and alternatives reviewed with patient and she agrees to proceed with the procedure.        Mariluz Turner MD

## 2019-04-22 ENCOUNTER — OFFICE VISIT (OUTPATIENT)
Dept: ONCOLOGY | Age: 54
End: 2019-04-22

## 2019-04-22 ENCOUNTER — HOSPITAL ENCOUNTER (OUTPATIENT)
Dept: INFUSION THERAPY | Age: 54
Discharge: HOME | End: 2019-04-22
Payer: COMMERCIAL

## 2019-04-22 VITALS
TEMPERATURE: 97.8 F | RESPIRATION RATE: 20 BRPM | BODY MASS INDEX: 23.14 KG/M2 | DIASTOLIC BLOOD PRESSURE: 75 MMHG | SYSTOLIC BLOOD PRESSURE: 141 MMHG | HEART RATE: 86 BPM | WEIGHT: 144 LBS | HEIGHT: 66 IN | OXYGEN SATURATION: 100 %

## 2019-04-22 VITALS
BODY MASS INDEX: 23.19 KG/M2 | SYSTOLIC BLOOD PRESSURE: 109 MMHG | RESPIRATION RATE: 18 BRPM | OXYGEN SATURATION: 98 % | TEMPERATURE: 98.8 F | HEART RATE: 81 BPM | DIASTOLIC BLOOD PRESSURE: 58 MMHG | HEIGHT: 66 IN | WEIGHT: 144.3 LBS

## 2019-04-22 DIAGNOSIS — Z17.0 MALIGNANT NEOPLASM OF LOWER-OUTER QUADRANT OF RIGHT BREAST OF FEMALE, ESTROGEN RECEPTOR POSITIVE (HCC): Primary | ICD-10-CM

## 2019-04-22 DIAGNOSIS — C50.911 MALIGNANT NEOPLASM OF RIGHT BREAST IN FEMALE, ESTROGEN RECEPTOR POSITIVE, UNSPECIFIED SITE OF BREAST (HCC): Primary | ICD-10-CM

## 2019-04-22 DIAGNOSIS — C50.511 MALIGNANT NEOPLASM OF LOWER-OUTER QUADRANT OF RIGHT BREAST OF FEMALE, ESTROGEN RECEPTOR POSITIVE (HCC): Primary | ICD-10-CM

## 2019-04-22 DIAGNOSIS — Z17.0 MALIGNANT NEOPLASM OF RIGHT BREAST IN FEMALE, ESTROGEN RECEPTOR POSITIVE, UNSPECIFIED SITE OF BREAST (HCC): Primary | ICD-10-CM

## 2019-04-22 DIAGNOSIS — Z51.11 ENCOUNTER FOR ANTINEOPLASTIC CHEMOTHERAPY: ICD-10-CM

## 2019-04-22 LAB
ALBUMIN SERPL-MCNC: 4.1 G/DL (ref 3.5–5)
ALBUMIN/GLOB SERPL: 1.6 {RATIO} (ref 1.1–2.2)
ALP SERPL-CCNC: 45 U/L (ref 45–117)
ALT SERPL-CCNC: 50 U/L (ref 12–78)
ANION GAP SERPL CALC-SCNC: 7 MMOL/L (ref 5–15)
AST SERPL-CCNC: 36 U/L (ref 15–37)
BASOPHILS # BLD: 0 K/UL (ref 0–0.1)
BASOPHILS NFR BLD: 1 % (ref 0–1)
BILIRUB SERPL-MCNC: 0.8 MG/DL (ref 0.2–1)
BUN SERPL-MCNC: 16 MG/DL (ref 6–20)
BUN/CREAT SERPL: 16 (ref 12–20)
CALCIUM SERPL-MCNC: 9.5 MG/DL (ref 8.5–10.1)
CHLORIDE SERPL-SCNC: 105 MMOL/L (ref 97–108)
CO2 SERPL-SCNC: 29 MMOL/L (ref 21–32)
CREAT SERPL-MCNC: 0.97 MG/DL (ref 0.55–1.02)
DIFFERENTIAL METHOD BLD: NORMAL
EOSINOPHIL # BLD: 0.2 K/UL (ref 0–0.4)
EOSINOPHIL NFR BLD: 3 % (ref 0–7)
ERYTHROCYTE [DISTWIDTH] IN BLOOD BY AUTOMATED COUNT: 13.2 % (ref 11.5–14.5)
GLOBULIN SER CALC-MCNC: 2.6 G/DL (ref 2–4)
GLUCOSE SERPL-MCNC: 89 MG/DL (ref 65–100)
HCT VFR BLD AUTO: 38.9 % (ref 35–47)
HGB BLD-MCNC: 12.8 G/DL (ref 11.5–16)
IMM GRANULOCYTES # BLD AUTO: 0 K/UL (ref 0–0.04)
IMM GRANULOCYTES NFR BLD AUTO: 0 % (ref 0–0.5)
LYMPHOCYTES # BLD: 2.4 K/UL (ref 0.8–3.5)
LYMPHOCYTES NFR BLD: 39 % (ref 12–49)
MCH RBC QN AUTO: 30.7 PG (ref 26–34)
MCHC RBC AUTO-ENTMCNC: 32.9 G/DL (ref 30–36.5)
MCV RBC AUTO: 93.3 FL (ref 80–99)
MONOCYTES # BLD: 0.5 K/UL (ref 0–1)
MONOCYTES NFR BLD: 8 % (ref 5–13)
NEUTS SEG # BLD: 3 K/UL (ref 1.8–8)
NEUTS SEG NFR BLD: 49 % (ref 32–75)
NRBC # BLD: 0 K/UL (ref 0–0.01)
NRBC BLD-RTO: 0 PER 100 WBC
PLATELET # BLD AUTO: 269 K/UL (ref 150–400)
PMV BLD AUTO: 9.4 FL (ref 8.9–12.9)
POTASSIUM SERPL-SCNC: 4.5 MMOL/L (ref 3.5–5.1)
PROT SERPL-MCNC: 6.7 G/DL (ref 6.4–8.2)
RBC # BLD AUTO: 4.17 M/UL (ref 3.8–5.2)
SODIUM SERPL-SCNC: 141 MMOL/L (ref 136–145)
WBC # BLD AUTO: 6.1 K/UL (ref 3.6–11)

## 2019-04-22 PROCEDURE — 74011250636 HC RX REV CODE- 250/636

## 2019-04-22 PROCEDURE — 74011000250 HC RX REV CODE- 250: Performed by: INTERNAL MEDICINE

## 2019-04-22 PROCEDURE — 80053 COMPREHEN METABOLIC PANEL: CPT

## 2019-04-22 PROCEDURE — 36415 COLL VENOUS BLD VENIPUNCTURE: CPT

## 2019-04-22 PROCEDURE — 96413 CHEMO IV INFUSION 1 HR: CPT

## 2019-04-22 PROCEDURE — 96375 TX/PRO/DX INJ NEW DRUG ADDON: CPT

## 2019-04-22 PROCEDURE — 74011250636 HC RX REV CODE- 250/636: Performed by: INTERNAL MEDICINE

## 2019-04-22 PROCEDURE — 85025 COMPLETE CBC W/AUTO DIFF WBC: CPT

## 2019-04-22 PROCEDURE — 96415 CHEMO IV INFUSION ADDL HR: CPT

## 2019-04-22 PROCEDURE — 36593 DECLOT VASCULAR DEVICE: CPT

## 2019-04-22 PROCEDURE — 96417 CHEMO IV INFUS EACH ADDL SEQ: CPT

## 2019-04-22 PROCEDURE — 77030012965 HC NDL HUBR BBMI -A

## 2019-04-22 RX ORDER — SODIUM CHLORIDE 9 MG/ML
25 INJECTION, SOLUTION INTRAVENOUS CONTINUOUS
Status: DISPENSED | OUTPATIENT
Start: 2019-04-22 | End: 2019-04-22

## 2019-04-22 RX ORDER — DIPHENHYDRAMINE HYDROCHLORIDE 50 MG/ML
50 INJECTION, SOLUTION INTRAMUSCULAR; INTRAVENOUS AS NEEDED
Status: ACTIVE | OUTPATIENT
Start: 2019-04-22 | End: 2019-04-22

## 2019-04-22 RX ORDER — DIPHENHYDRAMINE HYDROCHLORIDE 50 MG/ML
50 INJECTION, SOLUTION INTRAMUSCULAR; INTRAVENOUS ONCE
Status: COMPLETED | OUTPATIENT
Start: 2019-04-22 | End: 2019-04-22

## 2019-04-22 RX ORDER — SODIUM CHLORIDE 0.9 % (FLUSH) 0.9 %
10 SYRINGE (ML) INJECTION AS NEEDED
Status: ACTIVE | OUTPATIENT
Start: 2019-04-22 | End: 2019-04-22

## 2019-04-22 RX ORDER — ALBUTEROL SULFATE 90 UG/1
2 AEROSOL, METERED RESPIRATORY (INHALATION) AS NEEDED
Status: ACTIVE | OUTPATIENT
Start: 2019-04-22 | End: 2019-04-22

## 2019-04-22 RX ORDER — DIPHENHYDRAMINE HYDROCHLORIDE 50 MG/ML
50 INJECTION, SOLUTION INTRAMUSCULAR; INTRAVENOUS AS NEEDED
Status: DISPENSED | OUTPATIENT
Start: 2019-04-22 | End: 2019-04-22

## 2019-04-22 RX ORDER — SODIUM CHLORIDE 9 MG/ML
10 INJECTION INTRAMUSCULAR; INTRAVENOUS; SUBCUTANEOUS AS NEEDED
Status: ACTIVE | OUTPATIENT
Start: 2019-04-22 | End: 2019-04-22

## 2019-04-22 RX ORDER — ONDANSETRON 2 MG/ML
8 INJECTION INTRAMUSCULAR; INTRAVENOUS AS NEEDED
Status: ACTIVE | OUTPATIENT
Start: 2019-04-22 | End: 2019-04-22

## 2019-04-22 RX ORDER — HYDROCORTISONE SODIUM SUCCINATE 100 MG/2ML
100 INJECTION, POWDER, FOR SOLUTION INTRAMUSCULAR; INTRAVENOUS AS NEEDED
Status: DISPENSED | OUTPATIENT
Start: 2019-04-22 | End: 2019-04-22

## 2019-04-22 RX ORDER — HEPARIN 100 UNIT/ML
300-500 SYRINGE INTRAVENOUS AS NEEDED
Status: ACTIVE | OUTPATIENT
Start: 2019-04-22 | End: 2019-04-22

## 2019-04-22 RX ORDER — DEXAMETHASONE SODIUM PHOSPHATE 4 MG/ML
10 INJECTION, SOLUTION INTRA-ARTICULAR; INTRALESIONAL; INTRAMUSCULAR; INTRAVENOUS; SOFT TISSUE ONCE
Status: COMPLETED | OUTPATIENT
Start: 2019-04-22 | End: 2019-04-22

## 2019-04-22 RX ORDER — ACETAMINOPHEN 325 MG/1
650 TABLET ORAL AS NEEDED
Status: ACTIVE | OUTPATIENT
Start: 2019-04-22 | End: 2019-04-22

## 2019-04-22 RX ORDER — EPINEPHRINE 1 MG/ML
0.3 INJECTION, SOLUTION, CONCENTRATE INTRAVENOUS AS NEEDED
Status: ACTIVE | OUTPATIENT
Start: 2019-04-22 | End: 2019-04-22

## 2019-04-22 RX ADMIN — ALTEPLASE 2 MG: 2.2 INJECTION, POWDER, LYOPHILIZED, FOR SOLUTION INTRAVENOUS at 10:05

## 2019-04-22 RX ADMIN — DIPHENHYDRAMINE HYDROCHLORIDE 50 MG: 50 INJECTION INTRAMUSCULAR; INTRAVENOUS at 12:28

## 2019-04-22 RX ADMIN — Medication 10 ML: at 12:20

## 2019-04-22 RX ADMIN — SODIUM CHLORIDE 25 ML/HR: 900 INJECTION, SOLUTION INTRAVENOUS at 12:26

## 2019-04-22 RX ADMIN — Medication 10 ML: at 12:25

## 2019-04-22 RX ADMIN — Medication 500 UNITS: at 16:48

## 2019-04-22 RX ADMIN — TRASTUZUMAB 259 MG: 150 INJECTION, POWDER, LYOPHILIZED, FOR SOLUTION INTRAVENOUS at 13:29

## 2019-04-22 RX ADMIN — Medication 10 ML: at 16:48

## 2019-04-22 RX ADMIN — PACLITAXEL 139 MG: 6 INJECTION, SOLUTION INTRAVENOUS at 15:34

## 2019-04-22 RX ADMIN — SODIUM CHLORIDE 10 ML: 9 INJECTION, SOLUTION INTRAMUSCULAR; INTRAVENOUS; SUBCUTANEOUS at 12:26

## 2019-04-22 RX ADMIN — DEXAMETHASONE SODIUM PHOSPHATE 10 MG: 4 INJECTION, SOLUTION INTRA-ARTICULAR; INTRALESIONAL; INTRAMUSCULAR; INTRAVENOUS; SOFT TISSUE at 12:36

## 2019-04-22 RX ADMIN — FAMOTIDINE 20 MG: 10 INJECTION INTRAVENOUS at 12:27

## 2019-04-22 NOTE — PROGRESS NOTES

## 2019-04-22 NOTE — PROGRESS NOTES
Lady Clemens is a 52 y.o. female follow up for breast cancer. 1. Have you been to the ER, urgent care clinic since your last visit? Hospitalized since your last visit? No    2. Have you seen or consulted any other health care providers outside of the [address] [place] [address] since your last visit? Include any pap smears or colon screening.  No

## 2019-04-22 NOTE — PROGRESS NOTES
[facility] - Chemotherapy Progress Note 
 
0900 Pt admit to [facility] for C 1 Taxol/Herceptin ambulatory in stable condition. Assessment completed. No new concerns voiced. PAC placed 4/18/19 PAC with positive blood return but not enough for labs. Labs taken peripherally and Cath Darcy instilled. Echo done 4/11/19 64%. Labs obtained and patient proceeded to scheduled MD appointment. Consent obtained by MD and to be scanned into the chart. PT declines pregnancy RN reviewed chemotherapy discharge instructions sheet, when to call the MD, [facility] general instructions and side effects for medications. Pt verbalized understanding. Medications: 
 
Cath darcy ivp Dexamethasone ivp Benadryl ivp 
pepcid ivp Herceptin iv Taxol iv 
 
AVS and patient appointment sheet reviewed with patient. *** Pt tolerated treatment well. *** maintained positive blood return throughout treatment, flushed with positive blood return at conclusion and ***. D/c home ambulatory in no distress. Pt aware of next appointment scheduled for 4/29/19. Recent Results (from the past 12 hour(s)) CBC WITH AUTOMATED DIFF Collection Time: 04/22/19 10:00 AM  
Result Value Ref Range WBC 6.1 3.6 - 11.0 K/uL  
 RBC 4.17 3.80 - 5.20 M/uL  
 HGB 12.8 11.5 - 16.0 g/dL HCT 38.9 35.0 - 47.0 % MCV 93.3 80.0 - 99.0 FL  
 MCH 30.7 26.0 - 34.0 PG  
 MCHC 32.9 30.0 - 36.5 g/dL  
 RDW 13.2 11.5 - 14.5 % PLATELET 622 954 - 920 K/uL MPV 9.4 8.9 - 12.9 FL  
 NRBC 0.0 0  WBC ABSOLUTE NRBC 0.00 0.00 - 0.01 K/uL NEUTROPHILS 49 32 - 75 % LYMPHOCYTES 39 12 - 49 % MONOCYTES 8 5 - 13 % EOSINOPHILS 3 0 - 7 % BASOPHILS 1 0 - 1 % IMMATURE GRANULOCYTES 0 0.0 - 0.5 % ABS. NEUTROPHILS 3.0 1.8 - 8.0 K/UL  
 ABS. LYMPHOCYTES 2.4 0.8 - 3.5 K/UL  
 ABS. MONOCYTES 0.5 0.0 - 1.0 K/UL  
 ABS. EOSINOPHILS 0.2 0.0 - 0.4 K/UL  
 ABS. BASOPHILS 0.0 0.0 - 0.1 K/UL  
 ABS. IMM. GRANS. 0.0 0.00 - 0.04 K/UL  
 DF AUTOMATED METABOLIC PANEL, COMPREHENSIVE Collection Time: 04/22/19 10:00 AM  
Result Value Ref Range Sodium 141 136 - 145 mmol/L Potassium 4.5 3.5 - 5.1 mmol/L Chloride 105 97 - 108 mmol/L  
 CO2 29 21 - 32 mmol/L Anion gap 7 5 - 15 mmol/L Glucose 89 65 - 100 mg/dL BUN 16 6 - 20 MG/DL Creatinine 0.97 0.55 - 1.02 MG/DL  
 BUN/Creatinine ratio 16 12 - 20 GFR est AA >60 >60 ml/min/1.73m2 GFR est non-AA 60 (L) >60 ml/min/1.73m2 Calcium 9.5 8.5 - 10.1 MG/DL Bilirubin, total 0.8 0.2 - 1.0 MG/DL  
 ALT (SGPT) 50 12 - 78 U/L  
 AST (SGOT) 36 15 - 37 U/L Alk. phosphatase 45 45 - 117 U/L Protein, total 6.7 6.4 - 8.2 g/dL Albumin 4.1 3.5 - 5.0 g/dL Globulin 2.6 2.0 - 4.0 g/dL A-G Ratio 1.6 1.1 - 2.2

## 2019-04-29 ENCOUNTER — APPOINTMENT (OUTPATIENT)
Dept: INFUSION THERAPY | Age: 54
End: 2019-04-29
Payer: COMMERCIAL

## 2019-05-01 ENCOUNTER — HOSPITAL ENCOUNTER (OUTPATIENT)
Dept: INFUSION THERAPY | Age: 54
Discharge: HOME | End: 2019-05-01
Payer: COMMERCIAL

## 2019-05-01 ENCOUNTER — OFFICE VISIT (OUTPATIENT)
Dept: ONCOLOGY | Age: 54
End: 2019-05-01

## 2019-05-01 VITALS
WEIGHT: 144.2 LBS | SYSTOLIC BLOOD PRESSURE: 116 MMHG | RESPIRATION RATE: 18 BRPM | HEIGHT: 66 IN | BODY MASS INDEX: 23.18 KG/M2 | TEMPERATURE: 98.5 F | OXYGEN SATURATION: 100 % | DIASTOLIC BLOOD PRESSURE: 61 MMHG | HEART RATE: 83 BPM

## 2019-05-01 VITALS
TEMPERATURE: 98.2 F | SYSTOLIC BLOOD PRESSURE: 130 MMHG | DIASTOLIC BLOOD PRESSURE: 73 MMHG | RESPIRATION RATE: 18 BRPM | HEART RATE: 75 BPM | BODY MASS INDEX: 23.18 KG/M2 | OXYGEN SATURATION: 100 % | WEIGHT: 144.2 LBS | HEIGHT: 66 IN

## 2019-05-01 DIAGNOSIS — R19.7 DIARRHEA, UNSPECIFIED TYPE: ICD-10-CM

## 2019-05-01 DIAGNOSIS — Z51.11 ENCOUNTER FOR ANTINEOPLASTIC CHEMOTHERAPY: ICD-10-CM

## 2019-05-01 DIAGNOSIS — Z17.0 MALIGNANT NEOPLASM OF LOWER-OUTER QUADRANT OF RIGHT BREAST OF FEMALE, ESTROGEN RECEPTOR POSITIVE (HCC): Primary | ICD-10-CM

## 2019-05-01 DIAGNOSIS — M79.601 PAIN OF RIGHT UPPER EXTREMITY: ICD-10-CM

## 2019-05-01 DIAGNOSIS — C50.911 MALIGNANT NEOPLASM OF RIGHT BREAST IN FEMALE, ESTROGEN RECEPTOR POSITIVE, UNSPECIFIED SITE OF BREAST (HCC): Primary | ICD-10-CM

## 2019-05-01 DIAGNOSIS — C50.511 MALIGNANT NEOPLASM OF LOWER-OUTER QUADRANT OF RIGHT BREAST OF FEMALE, ESTROGEN RECEPTOR POSITIVE (HCC): Primary | ICD-10-CM

## 2019-05-01 DIAGNOSIS — Z17.0 MALIGNANT NEOPLASM OF RIGHT BREAST IN FEMALE, ESTROGEN RECEPTOR POSITIVE, UNSPECIFIED SITE OF BREAST (HCC): Primary | ICD-10-CM

## 2019-05-01 LAB
BASOPHILS # BLD: 0 K/UL (ref 0–0.1)
BASOPHILS NFR BLD: 1 % (ref 0–1)
DIFFERENTIAL METHOD BLD: NORMAL
EOSINOPHIL # BLD: 0.1 K/UL (ref 0–0.4)
EOSINOPHIL NFR BLD: 2 % (ref 0–7)
ERYTHROCYTE [DISTWIDTH] IN BLOOD BY AUTOMATED COUNT: 13 % (ref 11.5–14.5)
HCT VFR BLD AUTO: 35.7 % (ref 35–47)
HGB BLD-MCNC: 11.7 G/DL (ref 11.5–16)
IMM GRANULOCYTES # BLD AUTO: 0 K/UL (ref 0–0.04)
IMM GRANULOCYTES NFR BLD AUTO: 0 % (ref 0–0.5)
LYMPHOCYTES # BLD: 2.4 K/UL (ref 0.8–3.5)
LYMPHOCYTES NFR BLD: 47 % (ref 12–49)
MCH RBC QN AUTO: 30.2 PG (ref 26–34)
MCHC RBC AUTO-ENTMCNC: 32.8 G/DL (ref 30–36.5)
MCV RBC AUTO: 92 FL (ref 80–99)
MONOCYTES # BLD: 0.2 K/UL (ref 0–1)
MONOCYTES NFR BLD: 5 % (ref 5–13)
NEUTS SEG # BLD: 2.2 K/UL (ref 1.8–8)
NEUTS SEG NFR BLD: 45 % (ref 32–75)
NRBC # BLD: 0 K/UL (ref 0–0.01)
NRBC BLD-RTO: 0 PER 100 WBC
PLATELET # BLD AUTO: 353 K/UL (ref 150–400)
PMV BLD AUTO: 9.1 FL (ref 8.9–12.9)
RBC # BLD AUTO: 3.88 M/UL (ref 3.8–5.2)
WBC # BLD AUTO: 4.9 K/UL (ref 3.6–11)

## 2019-05-01 PROCEDURE — 74011000250 HC RX REV CODE- 250: Performed by: INTERNAL MEDICINE

## 2019-05-01 PROCEDURE — 77030012965 HC NDL HUBR BBMI -A

## 2019-05-01 PROCEDURE — 36415 COLL VENOUS BLD VENIPUNCTURE: CPT

## 2019-05-01 PROCEDURE — 96375 TX/PRO/DX INJ NEW DRUG ADDON: CPT

## 2019-05-01 PROCEDURE — 74011250636 HC RX REV CODE- 250/636

## 2019-05-01 PROCEDURE — 96417 CHEMO IV INFUS EACH ADDL SEQ: CPT

## 2019-05-01 PROCEDURE — 96413 CHEMO IV INFUSION 1 HR: CPT

## 2019-05-01 PROCEDURE — 74011250636 HC RX REV CODE- 250/636: Performed by: INTERNAL MEDICINE

## 2019-05-01 PROCEDURE — 85025 COMPLETE CBC W/AUTO DIFF WBC: CPT

## 2019-05-01 RX ORDER — SODIUM CHLORIDE 9 MG/ML
25 INJECTION, SOLUTION INTRAVENOUS CONTINUOUS
Status: DISPENSED | OUTPATIENT
Start: 2019-05-01 | End: 2019-05-01

## 2019-05-01 RX ORDER — DEXAMETHASONE SODIUM PHOSPHATE 100 MG/10ML
10 INJECTION INTRAMUSCULAR; INTRAVENOUS ONCE
Status: COMPLETED | OUTPATIENT
Start: 2019-05-01 | End: 2019-05-01

## 2019-05-01 RX ORDER — DIPHENHYDRAMINE HYDROCHLORIDE 50 MG/ML
50 INJECTION, SOLUTION INTRAMUSCULAR; INTRAVENOUS ONCE
Status: COMPLETED | OUTPATIENT
Start: 2019-05-01 | End: 2019-05-01

## 2019-05-01 RX ORDER — HEPARIN 100 UNIT/ML
300-500 SYRINGE INTRAVENOUS AS NEEDED
Status: ACTIVE | OUTPATIENT
Start: 2019-05-01 | End: 2019-05-01

## 2019-05-01 RX ORDER — SODIUM CHLORIDE 9 MG/ML
10 INJECTION INTRAMUSCULAR; INTRAVENOUS; SUBCUTANEOUS AS NEEDED
Status: ACTIVE | OUTPATIENT
Start: 2019-05-01 | End: 2019-05-01

## 2019-05-01 RX ADMIN — SODIUM CHLORIDE 25 ML/HR: 900 INJECTION, SOLUTION INTRAVENOUS at 11:07

## 2019-05-01 RX ADMIN — SODIUM CHLORIDE, PRESERVATIVE FREE 500 UNITS: 5 INJECTION INTRAVENOUS at 14:15

## 2019-05-01 RX ADMIN — FAMOTIDINE 20 MG: 10 INJECTION INTRAVENOUS at 11:08

## 2019-05-01 RX ADMIN — DIPHENHYDRAMINE HYDROCHLORIDE 50 MG: 50 INJECTION INTRAMUSCULAR; INTRAVENOUS at 11:09

## 2019-05-01 RX ADMIN — TRASTUZUMAB 130 MG: 150 INJECTION, POWDER, LYOPHILIZED, FOR SOLUTION INTRAVENOUS at 12:10

## 2019-05-01 RX ADMIN — PACLITAXEL 139 MG: 6 INJECTION, SOLUTION INTRAVENOUS at 13:09

## 2019-05-01 RX ADMIN — SODIUM CHLORIDE 10 ML: 9 INJECTION INTRAMUSCULAR; INTRAVENOUS; SUBCUTANEOUS at 09:20

## 2019-05-01 RX ADMIN — DEXAMETHASONE SODIUM PHOSPHATE 10 MG: 10 INJECTION INTRAMUSCULAR; INTRAVENOUS at 11:08

## 2019-05-01 NOTE — PROGRESS NOTES
[facility] [place] [address] [address] [facility] Portal: [phone]  F: [phone]      Reason for Visit:   Roselyn Kaplan is a 52 y.o. female who is seen in consultation at the request of Dr. Merlin Muzzy for evaluation of systemic therapy for breast cancer. Consulting physician:  Dr. Blanka Mehta    Treatment History:   · 12/10/18 right breast US bx:  IMC, gr 1, 0.12 cm (1.2 mm); insufficient for receptors  · 1/25/19 right breast core bx:  11:00 lobular intraepithelial neoplasia; right breast core bx 7:00:  DCIS, gr 2-3, cribriform, 1.4 cm, ER + at 94%, MI + at 54%  · 2/28/19 right mastectomy : LOQ IDC, 1.4 cm, ER + at 89%, MI + at 78%, HER 2 POSITIVE (IHC 2+, FISH ratio 3.3; sig/cell 9.2) ; ki67 22%, gr 2, DCIS present and extensive, 0/2 LN; pT1c pN0 cM0  · Myriad Myrisk negative  · TH 4/22/19-    History of Present Illness: An abnormal mammogram 11/2018 led to the pathology above. Interval history:  In today for follow up. Complains of gr 1 bleeding, gr 1 diarrhea, gr 2 fatigue, gr 1 chills, gr 2 hot flashes, gr 1 insomnia     FH:   Mother with breast cancer at age 45s and 46s; maternal aunt with breast cancer in her 46s; maternal aunt with breast cancer in her 46s; no ovarian, prostate, pancreas cancer    LMP 11/2018, spotty prior to that    Past Medical History:   Diagnosis Date    Adverse effect of anesthesia 1995    difficulty awakening      Past Surgical History:   Procedure Laterality Date    BREAST SURGERY PROCEDURE UNLISTED Left 1997    lumpectomy no lymph    COLONOSCOPY Left 12/14/2018    COLON[facility] ENDOSCOPY    HX ORTHOPAEDIC      foot surgery    IR INSERT TUNL CVC W PORT OVER 5 YEARS  4/18/2019      Social History     Tobacco Use    Smoking status: Former Smoker     Packs/day: 1.00     Years: 20.00     Pack years: 20.00     Types: Cigarettes     Last attempt to quit: 04/2004     Years since quitting: 15.0    Smokeless tobacco: Never Used   Substance Use Topics    Alcohol use: Yes     Alcohol/week: 8.4 oz     Types: 14 Glasses of wine per week      Family History   Problem Relation Age of Onset    Breast Cancer Mother         42's 1st time late 52's 2nd time   [facility] Mother         breast CA x2    Hypertension Mother     Heart Disease Father     Cancer Maternal Aunt         breast    Cancer Maternal Aunt         breast    Cancer Maternal Cousin         Breast     Current Outpatient Medications   Medication Sig    TURMERIC PO Take 2 Caps by mouth daily.  ALPRAZolam (XANAX) 0.25 mg tablet Take 0.25 mg by mouth.  spironolactone (ALDACTONE) 25 mg tablet Take  by mouth daily.  vit B Cmplx 3-FA-Vit C-Biotin (NEPHRO VINOD RX) 1- mg-mg-mcg tablet Take 1 Tab by mouth daily.  omega 3-dha-epa-fish oil (FISH OIL) 100-160-1,000 mg cap Take 1 Cap by mouth daily.  ascorbic acid, vitamin C, (VITAMIN C) 250 mg tablet Take 250 mg by mouth daily.  prochlorperazine (COMPAZINE) 10 mg tablet Take 1 Tab by mouth every six (6) hours as needed for Nausea.  lidocaine-prilocaine (EMLA) topical cream Apply  to affected area as needed for Pain.  ondansetron hcl (ZOFRAN) 8 mg tablet Take 1 Tab by mouth every twelve (12) hours as needed for Nausea. No current facility-administered medications for this visit.       Facility-Administered Medications Ordered in Other Visits   Medication Dose Route Frequency    sodium chloride 0.9% injection 10 mL  10 mL IntraVENous PRN    heparin (porcine) pf 300-500 Units  300-500 Units InterCATHeter PRN    0.9% sodium chloride infusion  25 mL/hr IntraVENous CONTINUOUS    dexamethasone (DECADRON) injection 10 mg  10 mg IntraVENous ONCE    diphenhydrAMINE (BENADRYL) injection 50 mg  50 mg IntraVENous ONCE    famotidine (PF) (PEPCID) 20 mg in sodium chloride 0.9% 10 mL injection  20 mg IntraVENous ONCE    trastuzumab (HERCEPTIN) 130 mg in 0.9% sodium chloride 250 mL, overfill volume 25 mL IVPB  2 mg/kg (Treatment Plan Recorded) IntraVENous ONCE    PACLitaxel (TAXOL) 139 mg in 0.9% sodium chloride 250 mL, overfill volume 25 mL chemo infusion  80 mg/m2 (Treatment Plan Recorded) IntraVENous ONCE      Allergies   Allergen Reactions    Ancef [Cefazolin] Hives    Penicillins Hives        Review of Systems: A complete review of systems was obtained, negative except as described above. Physical Exam:     Visit Vitals  /61   Pulse 83   Temp 98.5 °F (36.9 °C) (Temporal)   Resp 18   Ht 5' 6\" (1.676 m)   Wt 144 lb 3.2 oz (65.4 kg)   SpO2 100%   BMI 23.27 kg/m²     ECOG PS: 0  General: No distress  Eyes: PERRLA, anicteric sclerae  HENT: Atraumatic, OP clear  Neck: Supple  Lymphatic: No cervical, supraclavicular, or inguinal adenopathy  Respiratory: CTAB, normal respiratory effort  CV: Normal rate, regular rhythm, no murmurs, no peripheral edema  GI: Soft, nontender, nondistended, no masses, no hepatomegaly, no splenomegaly  MS: Normal gait and station. Digits without clubbing or cyanosis. Skin: No rashes, ecchymoses, or petechiae. Normal temperature, turgor, and texture. Psych: Alert, oriented, appropriate affect, normal judgment/insight        Results:     Lab Results   Component Value Date/Time    WBC 4.9 05/01/2019 09:19 AM    HGB 11.7 05/01/2019 09:19 AM    HCT 35.7 05/01/2019 09:19 AM    PLATELET 933 00/86/3779 09:19 AM    MCV 92.0 05/01/2019 09:19 AM    ABS.  NEUTROPHILS 2.2 05/01/2019 09:19 AM     Lab Results   Component Value Date/Time    Sodium 141 04/22/2019 10:00 AM    Potassium 4.5 04/22/2019 10:00 AM    Chloride 105 04/22/2019 10:00 AM    CO2 29 04/22/2019 10:00 AM    Glucose 89 04/22/2019 10:00 AM    BUN 16 04/22/2019 10:00 AM    Creatinine 0.97 04/22/2019 10:00 AM    GFR est AA >60 04/22/2019 10:00 AM    GFR est non-AA 60 (L) 04/22/2019 10:00 AM    Calcium 9.5 04/22/2019 10:00 AM     Lab Results   Component Value Date/Time    Bilirubin, total 0.8 04/22/2019 10:00 AM    ALT (SGPT) 50 04/22/2019 10:00 AM    AST (SGOT) 36 04/22/2019 10:00 AM    Alk. phosphatase 45 04/22/2019 10:00 AM    Protein, total 6.7 04/22/2019 10:00 AM    Albumin 4.1 04/22/2019 10:00 AM    Globulin 2.6 04/22/2019 10:00 AM         Records reviewed and summarized above. Pathology report(s) reviewed above. Radiology report(s) reviewed above. Assessment/plan:   1. Right LOQ IDC, 1.4 cm, gr 2, 0/2 LN, ER +, HI +, HER 2 POSITIVE:  Stage IA (both anatomic and prognostic). Likely postmenopausal    We explained to the patient that the goal of systemic adjuvant therapy is to improve the chances for cure and decrease the risk of relapse. We explained why a patient can have microscopic cancer spread now even though physical examination, laboratory studies and imaging studies are negative for cancer. We explained that the same treatments used now as adjuvant or preventive treatments rarely if ever are curative in women who develop metastases. Toshia  suggests equivalency between q.3 week Adriamycin, Cytoxan followed by weekly paclitaxel and trastuzumab compared with the [facility] regimen. However, this study was not powered to show a difference between these two regimens, and in the NEJ publication, the AC-TH arm showed a numerically advantange (though not statistically significant) to the [facility] arm. In this patient, it is completely reasonable to use [facility] approach and avoid the potential cardiotoxicity of the anthracyclines as well as the potential for leukemia. We discussed the toxicities of docetaxel and carboplatin chemotherapy in detail. This chemotherapy frequently causes a low white blood cell count and hospital admissions for treatment of neutropenic fever. We explained that we consider the use of growth factors to minimize this risk.   We explained to the patient that some side effects if they occur only last a few days including nausea, vomiting, stomatitis, arthralgia, myalgia,and allergic reactions to Taxotere. We told the patient that severe nausea and vomiting were uncommon and that some side effects,if they occur, will last longer; this includes hair loss, which will be seen in all patients treated with these agents and fatigue,which will be seen in most.  We also informed that for the patient that heart damage is rare with these agents. We explained that carboplatin can rarely cause kidney damage and high frequency hearing loss. We provided the patient in detail her information concerning the toxicities of this regimen in addition to her overall discussion. Rationale for therapy with trastuzumab was also discussed with the patient including a 50% proportional improvement in disease free survival and also an improvement in overall survival in patients receiving trastuzumab and chemotherapy for HER-2 positive breast cancer. The side effects of trastuzumab were discussed including a 4%-5% risk of dropping her ejection fraction while on treatment and about a 1% risk of CHF. We discussed that this drug will be used every 3 weeks for remainder of a year following the chemotherapy cycles. We will check her EF before chemotherapy and every 3 months while she is receiving trastuzumab. · TCH (Trastuzumab 8mg/kg load with cycle 1 then 6mg/kg, Docetaxel 75mg/m2, Carboplatin AUC 6) given every 3 weeks x 6 cycles  · Labs: CBC, CMP prior to each treatment  · Antiemetic Prophylaxis: Palonosetron and dexamethasone prior to chemo  · PRN Antiemetics: Ondansetron, Compazine  · Swelling prophylaxis: Dexmethasone 8mg bid the day before, and day after chemotherapy  · TTE prior to chemotherapy and every 12 weeks while on Trastuzumab  · Neulasta 24-72 hours after each treatment    Also discussed the APT study results, weekly paclitaxel 80 mg/m2 x 12 with weekly trastuzumab (4 mg/kg load then 2 mg/kg)  followed by outback trastuzumab to complete one year. 42% were pT1c.   I discussed the potential risks of paclitaxel chemotherapy with the patient. Major toxicities include nausea and vomiting, stomatitis, fatigue. We provided the patient with detailed information concerning toxicity including frequent toxicities that only last a few days, such as nausea, vomiting, mouth sores, arthralgia, myalgia, and potentially allergic reactions to paclitaxel, as well as toxicities which can be longer lasting including total alopecia, fatigue, anemia and neuropathy. We provided the patient with detailed information concerning the toxicities of their regimen in addition to our verbal discussion. This is a reasonable regimen in this setting (only 4 distant recurrences over 7 years in this study, 7 year DFS was 95%)     After this discussion, she is agreeable to paclitaxel and trastuzumab as in APT, will start on 4/22. She has signed informed consent. TTE on 4/11/19, EF 64%. Port was placed. TH C1D8 today. Will have her meet with our financial counselor today regarding her high co-pays and to see if she is eligible for the care card. The patient was given the following prescriptions with written and verbal instructions on how to use each:  Compazine, zofran,  a wig, and emla cream.      Discussed cold caps and cryotherapy with paclitaxel. She will not require XRT. Discussed endocrine therapy. The risks and benefits of tamoxifen were discussed in detail and the patient was informed of the following: Risks include a 1% risk of endometrial cancer for postmenopausal women treated for five years but no (or a minimally increased) risk in premenopausal women and that most women who develop tamoxifen-associated endometrial cancer can be cured.  Any bleeding in a postmenopausal woman should be reported to a health care professional. There is also a 1% risk of blood clots (thromboembolism) that can be fatal. All patients irrespective of age who take tamoxifen and who have not had a hysterectomy should have a pelvic exam and Pap smear yearly. Tamoxifen increases the risk of cataract formation and on rare occasions has caused retinal damage: an eye exam is recommended yearly. Other risks include vaginal discharge or dryness, the development or worsening of hot flashes or vasomotor symptoms, and bone loss in premenopausal women. There is excellent evidence that tamoxifen does not increase risk of depression, cause weight gain or have a major effect on sexual function. Available data suggests little or no effect on cognitive function. Benefits include a lowering of cholesterol and a reduction in the rate of bone loss for postmenopausal woman. Any other symptoms should be reported. The risks and benefits of aromatase inhibitors (anastrozole, letrozole, and exemestane) were discussed in detail and the patient was informed of the following: Risks include the development of painful muscles and joints (arthralgia/myalgia) and bone loss. Muscle and joint pain can be severe but rarely result in any tissue damage; symptoms usually resolve in several weeks when the medication is stopped. Bone loss is common and a bone density test is recommended as a baseline and then yearly to every several years depending on initial results. The risk of fractures is increased by a few percent in patients taking these drugs, but careful monitoring of bone density and using bone protecting agents when indicated can minimize these risks. Unlike tamoxifen there is no increased risk of blood clots or endometrial cancer. AIs can cause or worsen vaginal dryness but women using these drugs should not use vaginal estrogen preparations for these symptoms. AIs can also cause or increase hot flashes. Any other symptoms should be reported. Will make a final decision on endocrine therapy following chemotherapy. May need to check LH, FSH, estradiol, though likely postmenopausal    Follow-up after early breast cancer was discussed.  I recommend follow-up as defined by the American Society of Clinical Oncology and [facility]. This includes a visit to a health care professional every 3-6 months for 3 years, then every 6-12 months for 2 years, and then yearly as well as mammograms yearly. 2. Emotional well being:  She has excellent support and is coping well with her disease. Her sister is present with her at this visit. 3. Diarrhea: Due to chemo. Intermittent. PRN Imodium. Will monitor. 4. Right arm pain:  Ongoing prior to treatment, intermittent. May be due to surgery, will refer to [facility], PT. This patient was seen in conjunction with Emanuel Leon NP      I appreciate the opportunity to participate in Ms. Matthias Moctezuma's care. Signed By: Yamila Oviedo MD      No orders of the defined types were placed in this encounter.

## 2019-05-06 ENCOUNTER — APPOINTMENT (OUTPATIENT)
Dept: INFUSION THERAPY | Age: 54
End: 2019-05-06
Payer: COMMERCIAL

## 2019-05-07 ENCOUNTER — HOSPITAL ENCOUNTER (OUTPATIENT)
Dept: PHYSICAL THERAPY | Age: 54
Discharge: HOME | End: 2019-05-07
Payer: COMMERCIAL

## 2019-05-07 PROCEDURE — 97530 THERAPEUTIC ACTIVITIES: CPT | Performed by: PHYSICAL THERAPIST

## 2019-05-07 PROCEDURE — 97162 PT EVAL MOD COMPLEX 30 MIN: CPT | Performed by: PHYSICAL THERAPIST

## 2019-05-07 PROCEDURE — 97110 THERAPEUTIC EXERCISES: CPT | Performed by: PHYSICAL THERAPIST

## 2019-05-07 NOTE — PROGRESS NOTES
PT ONCOLOGY EVAL/DAILY NOTE Patient Name: Brunilda Mott Date:2019 : 1965 [x]  Patient  Verified Payor: Rosie Ends / Plan: [facility] GENERIC COMMERCIAL / Product Type: Commerical / In time:2:00  Out time:3:00 Total Treatment Time (min): 60 Total Timed Codes (min): 45 Visit #: 1 of 1 Treatment Area: Right shoulder pain [M25.511] SUBJECTIVE Current symptoms/Complaints: Experiencing R arm pain \"feels like something going to pop at elbow\" difficulty reaching behind and to the side and above head (top of the shoulder pain) lifting and putting a gallon of water back on shelf. 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 Prior to dx was playing tennis 2-3 times a week. Referring Provider: Milan Blanchard NP Medical Oncologist: Dr Angela Farris Primary Care Physician: Perry Harris MD  
 Plastic Surgeon: Dr Staci Martin sees mid May Next Appointment:  next infusion,  
Diagnosis: Right LOQ IDC, 1.4 cm, gr 2, 0/2 LN, ER +, HI +, HER 2 POSITIVE:  Stage IA (both anatomic and prognostic). Precautions/Indications: lymphedema and infection History of Present Illness: 
Treatment History:  
· 12/10/18 right breast US bx:  IMC, gr 1, 0.12 cm (1.2 mm); insufficient for receptors · 19 right breast core bx:  11:00 lobular intraepithelial neoplasia; right breast core bx 7:00:  DCIS, gr 2-3, cribriform, 1.4 cm, ER + at 94%, HI + at 54% · 19 right mastectomy : LOQ IDC, 1.4 cm, ER + at 89%, HI + at 78%, HER 2 POSITIVE (IHC 2+, FISH ratio 3.3; sig/cell 9.2) ; ki67 22%, gr 2, DCIS present and extensive, 0/2 LN; pT1c pN0 cM0 · Myriad Myrisk negative · [facility] [place] [facility]- 
  
History of Present Illness: An abnormal mammogram 2018 led to the pathology above. 
  
Interval history:  In today for follow up. Complains of gr 1 bleeding, gr 1 diarrhea, gr 2 fatigue, gr 1 chills, gr 2 hot flashes, gr 1 insomnia FH:  Mother with breast cancer at age 45s and 46s; maternal aunt with breast cancer in her 46s; maternal aunt with breast cancer in her 46s; no ovarian, prostate, pancreas cancer 
  
LMP 11/2018, spotty prior to that 
  
    
Past Medical History:  
Diagnosis Date  Adverse effect of anesthesia 1995  
  difficulty awakening Past Surgical History:  
Procedure Laterality Date  BREAST SURGERY PROCEDURE UNLISTED Left 1997  
  lumpectomy no lymph  COLONOSCOPY Left 12/14/2018  
  COLONOSCOPY performed by Stiven Garcia MD at [address] [place] [address] ENDOSCOPY  
 HX ORTHOPAEDIC      
  foot surgery  IR INSERT TUNL CVC W PORT OVER 5 YEARS   4/18/2019 Pain Intensity:3 on a scale of 0-10 · Pain Aggravating Factors: reaching lifting with R UE 
· Pain Relieving Factors: rest, less activity Has your activity changed since diagnosis? 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  
Cognition: A & O x 4 OBJECTIVE Ports/ Drains: Right side, no sign of infections Skin/Soft Tissue: very tanned, moisturized, no signs of infections Vitals: L BP= 128/89  74=HR SpO2=98% UE AROM:    
    R   L Flexion   165   170 Extension   20   20 Abd   160   165 Add   10   10 IR    34   35 ER    43   45 UPPER QUARTER   MUSCLE STRENGTH 
KEY      R  L 
0 - No Contraction  C1, C2 Neck Flex  4    
1 - Trace   C3 Side Flex  4  4 
2 - Poor   C4 Sh Elev  4  4 
3 - Fair   C5 Deltoid/Biceps 4  4 
4 - Good   C6 Wrist Ext  4  4 
5 - Normal   C7 Triceps  4  4 C8 Thumb Ext  4  4 
    T1 Hand Inst  4  4 Neurological: Reflexes / Sensations: Intact to LT throughout B UE's Palpation: TTP B UT/lev scap Posture: IR R g-h jt Girth (cm)                                Distance from wrist (cm) 
  R  L Palm:  19.2  19.6 Wrist:  15.8  15.3 Forearm: 23.7  23.7   17 Elbow:  25.8  24.9 Bicep:  27.6  23.7 Axilla:  30.8 Breath pattern assessment Diaphragm: able to initiate with v.c's , Not primary Anterior chest primary Accessory respiratory muscle use: none 15 min [x]Eval                  []Re-Eval  
 
 
30 min Therapeutic Exercise:  [x] See flow sheet :  
Rationale: increase ROM, increase strength and improve coordination to improve the patients ability to perform ADL's required for return to work and/or community 15 min Therapeutic Activity:  []  See flow sheet :  
Rationale: pt education on infection and lymphedema risk reduction, proper skin care, importance of compliance with prescribed HEP With 
 [x] TE 
 [x] TA 
 [] neuro 
 [] other: Patient Education: [] Review HEP [] Progressed/Changed HEP based on:  
[] positioning   [] body mechanics   [] transfers   [] heat/ice application   
[] other:   
 
  
 
Pain Level (0-10 scale) post treatment: 0 
 
ASSESSMENT/Changes in Function: Pt is a 52 y.o female with R breast cancer. Pt's chief c/o is pain when reaching above shoulder level and out to side. Pt would like to return to tennis playing but has been limited in that return d/t reconstruction and restrictions from plastic surgeon. Pt presents with slight decreased R shoulder motion and strength, postural and breath impairments. Pt was given a individualized HEP within the restrictions of plastic surgeon and demonstrated Brown. No further PT required at this time. Goals: 
See Plan of Care PLAN 
[]  Upgrade activities as tolerated     []  Continue plan of care 
[]  Update interventions per flow sheet [x]  Discharge due to: No further PT needs at this time.  
[]  Other:_   
 
Rajinder YEPEZ, CLT-RORY 5/7/2019  1:46 PM

## 2019-05-08 ENCOUNTER — HOSPITAL ENCOUNTER (OUTPATIENT)
Dept: INFUSION THERAPY | Age: 54
Discharge: HOME | End: 2019-05-08
Payer: COMMERCIAL

## 2019-05-08 VITALS
OXYGEN SATURATION: 99 % | RESPIRATION RATE: 18 BRPM | HEART RATE: 82 BPM | HEIGHT: 66 IN | WEIGHT: 143.1 LBS | BODY MASS INDEX: 23 KG/M2 | DIASTOLIC BLOOD PRESSURE: 67 MMHG | TEMPERATURE: 97.2 F | SYSTOLIC BLOOD PRESSURE: 117 MMHG

## 2019-05-08 DIAGNOSIS — Z17.0 MALIGNANT NEOPLASM OF RIGHT BREAST IN FEMALE, ESTROGEN RECEPTOR POSITIVE, UNSPECIFIED SITE OF BREAST (HCC): Primary | ICD-10-CM

## 2019-05-08 DIAGNOSIS — C50.911 MALIGNANT NEOPLASM OF RIGHT BREAST IN FEMALE, ESTROGEN RECEPTOR POSITIVE, UNSPECIFIED SITE OF BREAST (HCC): Primary | ICD-10-CM

## 2019-05-08 LAB
BASOPHILS # BLD: 0.1 K/UL (ref 0–0.1)
BASOPHILS NFR BLD: 1 % (ref 0–1)
DIFFERENTIAL METHOD BLD: ABNORMAL
EOSINOPHIL # BLD: 0.1 K/UL (ref 0–0.4)
EOSINOPHIL NFR BLD: 2 % (ref 0–7)
ERYTHROCYTE [DISTWIDTH] IN BLOOD BY AUTOMATED COUNT: 13.5 % (ref 11.5–14.5)
HCT VFR BLD AUTO: 34.5 % (ref 35–47)
HGB BLD-MCNC: 11.4 G/DL (ref 11.5–16)
IMM GRANULOCYTES # BLD AUTO: 0 K/UL (ref 0–0.04)
IMM GRANULOCYTES NFR BLD AUTO: 0 % (ref 0–0.5)
LYMPHOCYTES # BLD: 2.5 K/UL (ref 0.8–3.5)
LYMPHOCYTES NFR BLD: 55 % (ref 12–49)
MCH RBC QN AUTO: 30.5 PG (ref 26–34)
MCHC RBC AUTO-ENTMCNC: 33 G/DL (ref 30–36.5)
MCV RBC AUTO: 92.2 FL (ref 80–99)
MONOCYTES # BLD: 0.2 K/UL (ref 0–1)
MONOCYTES NFR BLD: 5 % (ref 5–13)
NEUTS SEG # BLD: 1.7 K/UL (ref 1.8–8)
NEUTS SEG NFR BLD: 37 % (ref 32–75)
NRBC # BLD: 0 K/UL (ref 0–0.01)
NRBC BLD-RTO: 0 PER 100 WBC
PLATELET # BLD AUTO: 339 K/UL (ref 150–400)
PMV BLD AUTO: 9.4 FL (ref 8.9–12.9)
RBC # BLD AUTO: 3.74 M/UL (ref 3.8–5.2)
WBC # BLD AUTO: 4.6 K/UL (ref 3.6–11)

## 2019-05-08 PROCEDURE — 96413 CHEMO IV INFUSION 1 HR: CPT

## 2019-05-08 PROCEDURE — 36415 COLL VENOUS BLD VENIPUNCTURE: CPT

## 2019-05-08 PROCEDURE — 96417 CHEMO IV INFUS EACH ADDL SEQ: CPT

## 2019-05-08 PROCEDURE — 96374 THER/PROPH/DIAG INJ IV PUSH: CPT

## 2019-05-08 PROCEDURE — 74011250636 HC RX REV CODE- 250/636: Performed by: INTERNAL MEDICINE

## 2019-05-08 PROCEDURE — 85025 COMPLETE CBC W/AUTO DIFF WBC: CPT

## 2019-05-08 PROCEDURE — 77030012965 HC NDL HUBR BBMI -A

## 2019-05-08 PROCEDURE — 74011000250 HC RX REV CODE- 250: Performed by: INTERNAL MEDICINE

## 2019-05-08 PROCEDURE — 74011250636 HC RX REV CODE- 250/636

## 2019-05-08 PROCEDURE — 96375 TX/PRO/DX INJ NEW DRUG ADDON: CPT

## 2019-05-08 RX ORDER — DIPHENHYDRAMINE HYDROCHLORIDE 50 MG/ML
50 INJECTION, SOLUTION INTRAMUSCULAR; INTRAVENOUS ONCE
Status: COMPLETED | OUTPATIENT
Start: 2019-05-08 | End: 2019-05-08

## 2019-05-08 RX ORDER — HEPARIN 100 UNIT/ML
300-500 SYRINGE INTRAVENOUS AS NEEDED
Status: ACTIVE | OUTPATIENT
Start: 2019-05-08 | End: 2019-05-08

## 2019-05-08 RX ORDER — SODIUM CHLORIDE 9 MG/ML
10 INJECTION INTRAMUSCULAR; INTRAVENOUS; SUBCUTANEOUS AS NEEDED
Status: ACTIVE | OUTPATIENT
Start: 2019-05-08 | End: 2019-05-08

## 2019-05-08 RX ORDER — SODIUM CHLORIDE 9 MG/ML
25 INJECTION, SOLUTION INTRAVENOUS CONTINUOUS
Status: DISPENSED | OUTPATIENT
Start: 2019-05-08 | End: 2019-05-08

## 2019-05-08 RX ORDER — DEXAMETHASONE SODIUM PHOSPHATE 100 MG/10ML
10 INJECTION INTRAMUSCULAR; INTRAVENOUS ONCE
Status: COMPLETED | OUTPATIENT
Start: 2019-05-08 | End: 2019-05-08

## 2019-05-08 RX ORDER — SODIUM CHLORIDE 0.9 % (FLUSH) 0.9 %
10 SYRINGE (ML) INJECTION AS NEEDED
Status: ACTIVE | OUTPATIENT
Start: 2019-05-08 | End: 2019-05-08

## 2019-05-08 RX ADMIN — PACLITAXEL 139 MG: 6 INJECTION, SOLUTION INTRAVENOUS at 12:39

## 2019-05-08 RX ADMIN — Medication 500 UNITS: at 13:50

## 2019-05-08 RX ADMIN — DEXAMETHASONE SODIUM PHOSPHATE 10 MG: 10 INJECTION INTRAMUSCULAR; INTRAVENOUS at 10:32

## 2019-05-08 RX ADMIN — FAMOTIDINE 20 MG: 10 INJECTION, SOLUTION INTRAVENOUS at 10:29

## 2019-05-08 RX ADMIN — TRASTUZUMAB 130 MG: 150 INJECTION, POWDER, LYOPHILIZED, FOR SOLUTION INTRAVENOUS at 11:48

## 2019-05-08 RX ADMIN — DIPHENHYDRAMINE HYDROCHLORIDE 50 MG: 50 INJECTION INTRAMUSCULAR; INTRAVENOUS at 10:31

## 2019-05-08 RX ADMIN — SODIUM CHLORIDE 25 ML/HR: 900 INJECTION, SOLUTION INTRAVENOUS at 10:28

## 2019-05-08 RX ADMIN — Medication 10 ML: at 13:50

## 2019-05-08 NOTE — PROGRESS NOTES
0900 Pt admit to [facility] for Taxol/Herceptin ambulatory in stable condition. Assessment completed. No new concerns voiced. Port with positive blood return. Visit Vitals  /67 (BP 1 Location: Left arm, BP Patient Position: Sitting)   Pulse 82   Temp 97.2 °F (36.2 °C)   Resp 18   Ht 5' 6\" (1.676 m)   Wt 64.9 kg (143 lb 1.6 oz)   SpO2 99%   Breastfeeding? No   BMI 23.10 kg/m²       Medications:  [email or web address]  Decadron IV  Benadryl IV  Pepcid IV  Herceptin IV  Taxol IV    1400 Pt tolerated treatment well. Port maintained positive blood return throughout treatment, flushed with positive blood return at conclusion. D/c home ambulatory in no distress. Pt aware of next OPI appointment scheduled for 5/15/19. Recent Results (from the past 12 hour(s))   CBC WITH AUTOMATED DIFF    Collection Time: 05/08/19  9:26 AM   Result Value Ref Range    WBC 4.6 3.6 - 11.0 K/uL    RBC 3.74 (L) 3.80 - 5.20 M/uL    HGB 11.4 (L) 11.5 - 16.0 g/dL    HCT 34.5 (L) 35.0 - 47.0 %    MCV 92.2 80.0 - 99.0 FL    MCH 30.5 26.0 - 34.0 PG    MCHC 33.0 30.0 - 36.5 g/dL    RDW 13.5 11.5 - 14.5 %    PLATELET 746 775 - 681 K/uL    MPV 9.4 8.9 - 12.9 FL    NRBC 0.0 0  WBC    ABSOLUTE NRBC 0.00 0.00 - 0.01 K/uL    NEUTROPHILS 37 32 - 75 %    LYMPHOCYTES 55 (H) 12 - 49 %    MONOCYTES 5 5 - 13 %    EOSINOPHILS 2 0 - 7 %    BASOPHILS 1 0 - 1 %    IMMATURE GRANULOCYTES 0 0.0 - 0.5 %    ABS. NEUTROPHILS 1.7 (L) 1.8 - 8.0 K/UL    ABS. LYMPHOCYTES 2.5 0.8 - 3.5 K/UL    ABS. MONOCYTES 0.2 0.0 - 1.0 K/UL    ABS. EOSINOPHILS 0.1 0.0 - 0.4 K/UL    ABS. BASOPHILS 0.1 0.0 - 0.1 K/UL    ABS. IMM.  GRANS. 0.0 0.00 - 0.04 K/UL    DF AUTOMATED

## 2019-05-13 NOTE — PROGRESS NOTES
[employer] [facility] [address], [address] Phone: [phone]  Fax: [phone] Plan of Care/ Statement of Necessity for Physical Therapy Services 2-15 Patient name: Thelma Bender  : 1965  Provider#: [id number] Referral source: Claudell Dupre, NP Medical/Treatment Diagnosis: Right shoulder pain [M25.511] Prior Hospitalization: see medical history Comorbidities: breast cancer Prior Level of Function: no difficulties with R UE use prior to dx and tx of breast cancer, regular  Medications: Verified on Patient Summary List 
 
Start of Care: 2019      Onset Date: 2018 The Plan of Care and following information is based on the information from the initial evaluation. Assessment/ key information: Pt is a 52 y.o female with R breast cancer. Pt's chief c/o is pain when reaching above shoulder level and out to side. Pt would like to return to tennis playing but has been limited in that return d/t reconstruction and restrictions from plastic surgeon. Pt presents with slight decreased R shoulder motion and strength, postural and breath impairments. Pt was given a individualized HEP within the restrictions of plastic surgeon and demonstrated Olivia. No further PT required at this time. Short Term Goals: To be accomplished in 1 treatments: 
1) Pt will be Independent with skin care routine to decrease risk of infection. MET 
2) Pt will be Independent with HEP for postural, breath, and strengthening exercises in order to prevent loss of function in R upper quadrant throughout course of treatment. MET 
3) Pt will know which signs and symptoms to report immediately to physician concerning their condition. MET Patient/ Caregiver education and instruction: exercises and other pt education on risk reduction for lymphedema and HEP PLAN:  
 
D/C to HEP. All goals met. [address] [facility], MEGHANN-RORY  5/13/2019  
 
________________________________________________________________________ I certify that the above Therapy Services are being furnished while the patient is under my care. I agree with the treatment plan and certify that this therapy is necessary. [de-identified] Signature:____________________  Date:____________Time: _________

## 2019-05-15 ENCOUNTER — APPOINTMENT (OUTPATIENT)
Dept: INFUSION THERAPY | Age: 54
End: 2019-05-15
Payer: COMMERCIAL

## 2019-05-15 ENCOUNTER — OFFICE VISIT (OUTPATIENT)
Dept: ONCOLOGY | Age: 54
End: 2019-05-15

## 2019-05-15 ENCOUNTER — HOSPITAL ENCOUNTER (OUTPATIENT)
Dept: INFUSION THERAPY | Age: 54
Discharge: HOME | End: 2019-05-15
Payer: COMMERCIAL

## 2019-05-15 VITALS
WEIGHT: 142.1 LBS | DIASTOLIC BLOOD PRESSURE: 70 MMHG | RESPIRATION RATE: 18 BRPM | BODY MASS INDEX: 22.84 KG/M2 | TEMPERATURE: 98.1 F | HEART RATE: 84 BPM | SYSTOLIC BLOOD PRESSURE: 124 MMHG | HEIGHT: 66 IN

## 2019-05-15 VITALS
HEART RATE: 81 BPM | SYSTOLIC BLOOD PRESSURE: 124 MMHG | WEIGHT: 142.1 LBS | BODY MASS INDEX: 22.84 KG/M2 | TEMPERATURE: 98.1 F | RESPIRATION RATE: 18 BRPM | HEIGHT: 66 IN | DIASTOLIC BLOOD PRESSURE: 67 MMHG | OXYGEN SATURATION: 98 %

## 2019-05-15 DIAGNOSIS — C50.511 MALIGNANT NEOPLASM OF LOWER-OUTER QUADRANT OF RIGHT BREAST OF FEMALE, ESTROGEN RECEPTOR POSITIVE (HCC): Primary | ICD-10-CM

## 2019-05-15 DIAGNOSIS — R04.0 NOSEBLEED: ICD-10-CM

## 2019-05-15 DIAGNOSIS — C50.911 MALIGNANT NEOPLASM OF RIGHT BREAST IN FEMALE, ESTROGEN RECEPTOR POSITIVE, UNSPECIFIED SITE OF BREAST (HCC): Primary | ICD-10-CM

## 2019-05-15 DIAGNOSIS — Z17.0 MALIGNANT NEOPLASM OF LOWER-OUTER QUADRANT OF RIGHT BREAST OF FEMALE, ESTROGEN RECEPTOR POSITIVE (HCC): Primary | ICD-10-CM

## 2019-05-15 DIAGNOSIS — R19.7 DIARRHEA, UNSPECIFIED TYPE: ICD-10-CM

## 2019-05-15 DIAGNOSIS — Z17.0 MALIGNANT NEOPLASM OF RIGHT BREAST IN FEMALE, ESTROGEN RECEPTOR POSITIVE, UNSPECIFIED SITE OF BREAST (HCC): Primary | ICD-10-CM

## 2019-05-15 DIAGNOSIS — M79.601 PAIN OF RIGHT UPPER EXTREMITY: ICD-10-CM

## 2019-05-15 DIAGNOSIS — Z51.11 ENCOUNTER FOR ANTINEOPLASTIC CHEMOTHERAPY: ICD-10-CM

## 2019-05-15 LAB
ALBUMIN SERPL-MCNC: 3.6 G/DL (ref 3.5–5)
ALBUMIN/GLOB SERPL: 1.1 {RATIO} (ref 1.1–2.2)
ALP SERPL-CCNC: 46 U/L (ref 45–117)
ALT SERPL-CCNC: 45 U/L (ref 12–78)
ANION GAP SERPL CALC-SCNC: 5 MMOL/L (ref 5–15)
AST SERPL-CCNC: 22 U/L (ref 15–37)
BASOPHILS # BLD: 0 K/UL (ref 0–0.1)
BASOPHILS NFR BLD: 1 % (ref 0–1)
BILIRUB SERPL-MCNC: 0.5 MG/DL (ref 0.2–1)
BUN SERPL-MCNC: 10 MG/DL (ref 6–20)
BUN/CREAT SERPL: 14 (ref 12–20)
CALCIUM SERPL-MCNC: 9.1 MG/DL (ref 8.5–10.1)
CHLORIDE SERPL-SCNC: 105 MMOL/L (ref 97–108)
CO2 SERPL-SCNC: 29 MMOL/L (ref 21–32)
CREAT SERPL-MCNC: 0.71 MG/DL (ref 0.55–1.02)
DIFFERENTIAL METHOD BLD: ABNORMAL
EOSINOPHIL # BLD: 0.1 K/UL (ref 0–0.4)
EOSINOPHIL NFR BLD: 1 % (ref 0–7)
ERYTHROCYTE [DISTWIDTH] IN BLOOD BY AUTOMATED COUNT: 13.8 % (ref 11.5–14.5)
GLOBULIN SER CALC-MCNC: 3.2 G/DL (ref 2–4)
GLUCOSE SERPL-MCNC: 104 MG/DL (ref 65–100)
HCT VFR BLD AUTO: 35.2 % (ref 35–47)
HGB BLD-MCNC: 11.8 G/DL (ref 11.5–16)
IMM GRANULOCYTES # BLD AUTO: 0 K/UL (ref 0–0.04)
IMM GRANULOCYTES NFR BLD AUTO: 0 % (ref 0–0.5)
LYMPHOCYTES # BLD: 2.3 K/UL (ref 0.8–3.5)
LYMPHOCYTES NFR BLD: 52 % (ref 12–49)
MCH RBC QN AUTO: 31.2 PG (ref 26–34)
MCHC RBC AUTO-ENTMCNC: 33.5 G/DL (ref 30–36.5)
MCV RBC AUTO: 93.1 FL (ref 80–99)
MONOCYTES # BLD: 0.2 K/UL (ref 0–1)
MONOCYTES NFR BLD: 4 % (ref 5–13)
NEUTS SEG # BLD: 1.9 K/UL (ref 1.8–8)
NEUTS SEG NFR BLD: 42 % (ref 32–75)
NRBC # BLD: 0 K/UL (ref 0–0.01)
NRBC BLD-RTO: 0 PER 100 WBC
PLATELET # BLD AUTO: 386 K/UL (ref 150–400)
PMV BLD AUTO: 9.5 FL (ref 8.9–12.9)
POTASSIUM SERPL-SCNC: 3.7 MMOL/L (ref 3.5–5.1)
PROT SERPL-MCNC: 6.8 G/DL (ref 6.4–8.2)
RBC # BLD AUTO: 3.78 M/UL (ref 3.8–5.2)
SODIUM SERPL-SCNC: 139 MMOL/L (ref 136–145)
WBC # BLD AUTO: 4.5 K/UL (ref 3.6–11)

## 2019-05-15 PROCEDURE — 96417 CHEMO IV INFUS EACH ADDL SEQ: CPT

## 2019-05-15 PROCEDURE — 74011250636 HC RX REV CODE- 250/636: Performed by: INTERNAL MEDICINE

## 2019-05-15 PROCEDURE — 96413 CHEMO IV INFUSION 1 HR: CPT

## 2019-05-15 PROCEDURE — 85025 COMPLETE CBC W/AUTO DIFF WBC: CPT

## 2019-05-15 PROCEDURE — 77030012965 HC NDL HUBR BBMI -A

## 2019-05-15 PROCEDURE — 80053 COMPREHEN METABOLIC PANEL: CPT

## 2019-05-15 PROCEDURE — 36415 COLL VENOUS BLD VENIPUNCTURE: CPT

## 2019-05-15 PROCEDURE — 74011250636 HC RX REV CODE- 250/636

## 2019-05-15 PROCEDURE — 96375 TX/PRO/DX INJ NEW DRUG ADDON: CPT

## 2019-05-15 RX ORDER — SODIUM CHLORIDE 9 MG/ML
10 INJECTION INTRAMUSCULAR; INTRAVENOUS; SUBCUTANEOUS AS NEEDED
Status: ACTIVE | OUTPATIENT
Start: 2019-05-15 | End: 2019-05-15

## 2019-05-15 RX ORDER — SODIUM CHLORIDE 9 MG/ML
25 INJECTION, SOLUTION INTRAVENOUS CONTINUOUS
Status: DISPENSED | OUTPATIENT
Start: 2019-05-15 | End: 2019-05-15

## 2019-05-15 RX ORDER — DEXAMETHASONE SODIUM PHOSPHATE 100 MG/10ML
10 INJECTION INTRAMUSCULAR; INTRAVENOUS ONCE
Status: COMPLETED | OUTPATIENT
Start: 2019-05-15 | End: 2019-05-15

## 2019-05-15 RX ORDER — HEPARIN 100 UNIT/ML
300-500 SYRINGE INTRAVENOUS AS NEEDED
Status: ACTIVE | OUTPATIENT
Start: 2019-05-15 | End: 2019-05-15

## 2019-05-15 RX ORDER — SODIUM CHLORIDE 0.9 % (FLUSH) 0.9 %
10 SYRINGE (ML) INJECTION AS NEEDED
Status: ACTIVE | OUTPATIENT
Start: 2019-05-15 | End: 2019-05-15

## 2019-05-15 RX ORDER — DIPHENHYDRAMINE HYDROCHLORIDE 50 MG/ML
50 INJECTION, SOLUTION INTRAMUSCULAR; INTRAVENOUS ONCE
Status: COMPLETED | OUTPATIENT
Start: 2019-05-15 | End: 2019-05-15

## 2019-05-15 RX ADMIN — SODIUM CHLORIDE 25 ML/HR: 900 INJECTION, SOLUTION INTRAVENOUS at 11:04

## 2019-05-15 RX ADMIN — Medication 500 UNITS: at 13:58

## 2019-05-15 RX ADMIN — Medication 10 ML: at 13:58

## 2019-05-15 RX ADMIN — DIPHENHYDRAMINE HYDROCHLORIDE 50 MG: 50 INJECTION INTRAMUSCULAR; INTRAVENOUS at 11:10

## 2019-05-15 RX ADMIN — PACLITAXEL 139 MG: 6 INJECTION, SOLUTION INTRAVENOUS at 12:43

## 2019-05-15 RX ADMIN — DEXAMETHASONE SODIUM PHOSPHATE 10 MG: 10 INJECTION INTRAMUSCULAR; INTRAVENOUS at 11:15

## 2019-05-15 RX ADMIN — FAMOTIDINE 20 MG: 10 INJECTION, SOLUTION INTRAVENOUS at 11:05

## 2019-05-15 RX ADMIN — TRASTUZUMAB 130 MG: 150 INJECTION, POWDER, LYOPHILIZED, FOR SOLUTION INTRAVENOUS at 11:43

## 2019-05-15 NOTE — PROGRESS NOTES
[facility]  [address] [facility], [address]  Amy Forward: [phone]  F: [phone]      Reason for Visit:   Michael Mina is a 52 y.o. female who is seen in consultation at the request of Dr. Jamilah Hammonds for evaluation of systemic therapy for breast cancer. Consulting physician:  Dr. Mtich Abel    Treatment History:   · 12/10/18 right breast US bx:  IMC, gr 1, 0.12 cm (1.2 mm); insufficient for receptors  · 1/25/19 right breast core bx:  11:00 lobular intraepithelial neoplasia; right breast core bx 7:00:  DCIS, gr 2-3, cribriform, 1.4 cm, ER + at 94%, FL + at 54%  · 2/28/19 right mastectomy : LOQ IDC, 1.4 cm, ER + at 89%, FL + at 78%, HER 2 POSITIVE (IHC 2+, FISH ratio 3.3; sig/cell 9.2) ; ki67 22%, gr 2, DCIS present and extensive, 0/2 LN; pT1c pN0 cM0  · Myriad Myrisk negative  · TH 4/22/19-    History of Present Illness: An abnormal mammogram 11/2018 led to the pathology above. Interval history:  In today for follow up. Complains of gr 2 bleeding, gr 1 diarrhea, gr 1 fatigue, gr 1 hair loss, gr 1 chills, gr 2 hot flashes, gr 1 insomnia, 5/10 intermittent pain to shoulder/arm.      FH:  Mother with breast cancer at age 45s and 46s; maternal aunt with breast cancer in her 46s; maternal aunt with breast cancer in her 46s; no ovarian, prostate, pancreas cancer    LMP 11/2018, spotty prior to that    Past Medical History:   Diagnosis Date    Adverse effect of anesthesia 1995    difficulty awakening      Past Surgical History:   Procedure Laterality Date    BREAST SURGERY PROCEDURE UNLISTED Left 1997    lumpectomy no lymph    COLONOSCOPY Left 12/14/2018    COL[facility]viz [facility] at [facility] ENDOSCOPY    HX ORTHOPAEDIC      foot surgery    IR INSERT TUNL CVC W PORT OVER 5 YEARS  4/18/2019      Social History     Tobacco Use    Smoking status: Former Smoker     Packs/day: 1.00     Years: 20.00     Pack years: 20.00     Types: Cigarettes Last attempt to quit: 04/2004     Years since quitting: 15.1    Smokeless tobacco: Never Used   Substance Use Topics    Alcohol use: Yes     Alcohol/week: 8.4 oz     Types: 14 Glasses of wine per week      Family History   Problem Relation Age of Onset    Breast Cancer Mother         42's 1st time late 52's 2nd time   [facility] Mother         breast CA x2    Hypertension Mother     Heart Disease Father     Cancer Maternal Aunt         breast    Cancer Maternal Aunt         breast    Cancer Maternal Cousin         Breast     Current Outpatient Medications   Medication Sig    TURMERIC PO Take 2 Caps by mouth daily.  ALPRAZolam (XANAX) 0.25 mg tablet Take 0.25 mg by mouth.  spironolactone (ALDACTONE) 25 mg tablet Take  by mouth daily.  vit B Cmplx 3-FA-Vit C-Biotin (NEPHRO VINOD RX) 1- mg-mg-mcg tablet Take 1 Tab by mouth daily.  omega 3-dha-epa-fish oil (FISH OIL) 100-160-1,000 mg cap Take 1 Cap by mouth daily.  ascorbic acid, vitamin C, (VITAMIN C) 250 mg tablet Take 250 mg by mouth daily.  prochlorperazine (COMPAZINE) 10 mg tablet Take 1 Tab by mouth every six (6) hours as needed for Nausea.  lidocaine-prilocaine (EMLA) topical cream Apply  to affected area as needed for Pain.  ondansetron hcl (ZOFRAN) 8 mg tablet Take 1 Tab by mouth every twelve (12) hours as needed for Nausea. No current facility-administered medications for this visit. Allergies   Allergen Reactions    Ancef [Cefazolin] Hives    Penicillins Hives        Review of Systems: A complete review of systems was obtained, negative except as described above.     Physical Exam:     Visit Vitals  /67   Pulse 81   Temp 98.1 °F (36.7 °C) (Temporal)   Resp 18   Ht 5' 6\" (1.676 m)   Wt 142 lb 1.6 oz (64.5 kg)   SpO2 98%   BMI 22.94 kg/m²     ECOG PS: 0  General: No distress  Eyes: PERRLA, anicteric sclerae  HENT: Atraumatic, OP clear  Neck: Supple  Lymphatic: No cervical, supraclavicular, or inguinal adenopathy  Respiratory: CTAB, normal respiratory effort  CV: Normal rate, regular rhythm, no murmurs, no peripheral edema  GI: Soft, nontender, nondistended, no masses, no hepatomegaly, no splenomegaly  MS: Normal gait and station. Digits without clubbing or cyanosis. Skin: No rashes, ecchymoses, or petechiae. Normal temperature, turgor, and texture. Psych: Alert, oriented, appropriate affect, normal judgment/insight        Results:     Lab Results   Component Value Date/Time    WBC 4.5 05/15/2019 09:19 AM    HGB 11.8 05/15/2019 09:19 AM    HCT 35.2 05/15/2019 09:19 AM    PLATELET 144 40/02/6108 09:19 AM    MCV 93.1 05/15/2019 09:19 AM    ABS. NEUTROPHILS 1.9 05/15/2019 09:19 AM     Lab Results   Component Value Date/Time    Sodium 141 04/22/2019 10:00 AM    Potassium 4.5 04/22/2019 10:00 AM    Chloride 105 04/22/2019 10:00 AM    CO2 29 04/22/2019 10:00 AM    Glucose 89 04/22/2019 10:00 AM    BUN 16 04/22/2019 10:00 AM    Creatinine 0.97 04/22/2019 10:00 AM    GFR est AA >60 04/22/2019 10:00 AM    GFR est non-AA 60 (L) 04/22/2019 10:00 AM    Calcium 9.5 04/22/2019 10:00 AM     Lab Results   Component Value Date/Time    Bilirubin, total 0.8 04/22/2019 10:00 AM    ALT (SGPT) 50 04/22/2019 10:00 AM    AST (SGOT) 36 04/22/2019 10:00 AM    Alk. phosphatase 45 04/22/2019 10:00 AM    Protein, total 6.7 04/22/2019 10:00 AM    Albumin 4.1 04/22/2019 10:00 AM    Globulin 2.6 04/22/2019 10:00 AM         Records reviewed and summarized above. Pathology report(s) reviewed above. Radiology report(s) reviewed above. Assessment/plan:   1. Right LOQ IDC, 1.4 cm, gr 2, 0/2 LN, ER +, MT +, HER 2 POSITIVE:  Stage IA (both anatomic and prognostic). Likely postmenopausal    We explained to the patient that the goal of systemic adjuvant therapy is to improve the chances for cure and decrease the risk of relapse.  We explained why a patient can have microscopic cancer spread now even though physical examination, laboratory studies and imaging studies are negative for cancer. We explained that the same treatments used now as adjuvant or preventive treatments rarely if ever are curative in women who develop metastases. Sandra Griffiths suggests equivalency between q.3 week Adriamycin, Cytoxan followed by weekly paclitaxel and trastuzumab compared with the [facility] regimen. However, this study was not powered to show a difference between these two regimens, and in the [facility] publication, the AC-TH arm showed a numerically advantange (though not statistically significant) to the [facility] arm. In this patient, it is completely reasonable to use [facility] approach and avoid the potential cardiotoxicity of the anthracyclines as well as the potential for leukemia. We discussed the toxicities of docetaxel and carboplatin chemotherapy in detail. This chemotherapy frequently causes a low white blood cell count and hospital admissions for treatment of neutropenic fever. We explained that we consider the use of growth factors to minimize this risk. We explained to the patient that some side effects if they occur only last a few days including nausea, vomiting, stomatitis, arthralgia, myalgia,and allergic reactions to Taxotere. We told the patient that severe nausea and vomiting were uncommon and that some side effects,if they occur, will last longer; this includes hair loss, which will be seen in all patients treated with these agents and fatigue,which will be seen in most.  We also informed that for the patient that heart damage is rare with these agents. We explained that carboplatin can rarely cause kidney damage and high frequency hearing loss. We provided the patient in detail her information concerning the toxicities of this regimen in addition to her overall discussion.       Rationale for therapy with trastuzumab was also discussed with the patient including a 50% proportional improvement in disease free survival and also an improvement in overall survival in patients receiving trastuzumab and chemotherapy for HER-2 positive breast cancer. The side effects of trastuzumab were discussed including a 4%-5% risk of dropping her ejection fraction while on treatment and about a 1% risk of CHF. We discussed that this drug will be used every 3 weeks for remainder of a year following the chemotherapy cycles. We will check her EF before chemotherapy and every 3 months while she is receiving trastuzumab. · TCH (Trastuzumab 8mg/kg load with cycle 1 then 6mg/kg, Docetaxel 75mg/m2, Carboplatin AUC 6) given every 3 weeks x 6 cycles  · Labs: CBC, CMP prior to each treatment  · Antiemetic Prophylaxis: Palonosetron and dexamethasone prior to chemo  · PRN Antiemetics: Ondansetron, Compazine  · Swelling prophylaxis: Dexmethasone 8mg bid the day before, and day after chemotherapy  · TTE prior to chemotherapy and every 12 weeks while on Trastuzumab  · Neulasta 24-72 hours after each treatment    Also discussed the APT study results, weekly paclitaxel 80 mg/m2 x 12 with weekly trastuzumab (4 mg/kg load then 2 mg/kg)  followed by outback trastuzumab to complete one year. 42% were pT1c. I discussed the potential risks of paclitaxel chemotherapy with the patient. Major toxicities include nausea and vomiting, stomatitis, fatigue. We provided the patient with detailed information concerning toxicity including frequent toxicities that only last a few days, such as nausea, vomiting, mouth sores, arthralgia, myalgia, and potentially allergic reactions to paclitaxel, as well as toxicities which can be longer lasting including total alopecia, fatigue, anemia and neuropathy. We provided the patient with detailed information concerning the toxicities of their regimen in addition to our verbal discussion.     This is a reasonable regimen in this setting (only 4 distant recurrences over 7 years in this study, 7 year DFS was 95%)     After this discussion, she is agreeable to paclitaxel and trastuzumab as in APT, will start on 4/22. She has signed informed consent. TTE on 4/11/19, EF 64%. Port was placed. TH C2D1 today. 4/12 today    She has met keegan our financial counselor today regarding her high co-pays and to see if she is eligible for the care card. The patient was given the following prescriptions with written and verbal instructions on how to use each:  Compazine, zofran,  a wig, and emla cream.      Discussed cold caps and cryotherapy with paclitaxel. She will not require XRT. Discussed endocrine therapy. The risks and benefits of tamoxifen were discussed in detail and the patient was informed of the following: Risks include a 1% risk of endometrial cancer for postmenopausal women treated for five years but no (or a minimally increased) risk in premenopausal women and that most women who develop tamoxifen-associated endometrial cancer can be cured. Any bleeding in a postmenopausal woman should be reported to a health care professional. There is also a 1% risk of blood clots (thromboembolism) that can be fatal. All patients irrespective of age who take tamoxifen and who have not had a hysterectomy should have a pelvic exam and Pap smear yearly. Tamoxifen increases the risk of cataract formation and on rare occasions has caused retinal damage: an eye exam is recommended yearly. Other risks include vaginal discharge or dryness, the development or worsening of hot flashes or vasomotor symptoms, and bone loss in premenopausal women. There is excellent evidence that tamoxifen does not increase risk of depression, cause weight gain or have a major effect on sexual function. Available data suggests little or no effect on cognitive function. Benefits include a lowering of cholesterol and a reduction in the rate of bone loss for postmenopausal woman. Any other symptoms should be reported.     The risks and benefits of aromatase inhibitors (anastrozole, letrozole, and exemestane) were discussed in detail and the patient was informed of the following: Risks include the development of painful muscles and joints (arthralgia/myalgia) and bone loss. Muscle and joint pain can be severe but rarely result in any tissue damage; symptoms usually resolve in several weeks when the medication is stopped. Bone loss is common and a bone density test is recommended as a baseline and then yearly to every several years depending on initial results. The risk of fractures is increased by a few percent in patients taking these drugs, but careful monitoring of bone density and using bone protecting agents when indicated can minimize these risks. Unlike tamoxifen there is no increased risk of blood clots or endometrial cancer. AIs can cause or worsen vaginal dryness but women using these drugs should not use vaginal estrogen preparations for these symptoms. AIs can also cause or increase hot flashes. Any other symptoms should be reported. Will make a final decision on endocrine therapy following chemotherapy. May need to check LH, FSH, estradiol, though likely postmenopausal    Follow-up after early breast cancer was discussed. I recommend follow-up as defined by the American Society of Clinical Oncology and [place]ner. This includes a visit to a health care professional every 3-6 months for 3 years, then every 6-12 months for 2 years, and then yearly as well as mammograms yearly. 2. Emotional well being:  She has excellent support and is coping well with her disease. 3. Diarrhea: Due to chemo. Intermittent. PRN Imodium. Will monitor. 4. Right arm pain:  Ongoing prior to treatment, intermittent. May be due to surgery. Previously referred to [facility], PT. She has met with [facility], PT and was given exercises which have helped. Will monitor. 5. Nosebleeds:  Due to chemo. Minor. Recommend nasal saline PRN. Will monitor.       This patient was seen in conjunction with Mera Altamirano NP      I appreciate the opportunity to participate in Ms. Alyce Moctezuma's care. Signed By: Madhav Diaz MD      No orders of the defined types were placed in this encounter.

## 2019-05-15 NOTE — PROGRESS NOTES
[facility] Progress Note    Date: May 15, 2019    Name: Heri Butterfield    MRN: [id number]         : 1965    Ms. Reynold Holguin Arrived ambulatory and in no distress for cycle 2 day 1 of Taxol Herceptin regimen. Assessment was completed, no acute issues at this time, no new complaints voiced. R chest port accessed without difficulty, labs drawn and in process. Chemotherapy Flowsheet 5/15/2019   Cycle C2D1   Date 5/15/2019   Drug / Regimen Taxol / Herceptin   Pre Meds -   Notes -         0930  Proceeded to appt with Dr. Margarita Parnell    Ms. Moctezuma's vitals were reviewed. Patient Vitals for the past 12 hrs:   Temp Pulse Resp BP   05/15/19 1349 98.1 °F (36.7 °C) 84 18 124/70   05/15/19 0916 98.1 °F (36.7 °C) 81 18 124/67     Lab results were obtained and reviewed. Recent Results (from the past 12 hour(s))   METABOLIC PANEL, COMPREHENSIVE    Collection Time: 05/15/19  9:19 AM   Result Value Ref Range    Sodium 139 136 - 145 mmol/L    Potassium 3.7 3.5 - 5.1 mmol/L    Chloride 105 97 - 108 mmol/L    CO2 29 21 - 32 mmol/L    Anion gap 5 5 - 15 mmol/L    Glucose 104 (H) 65 - 100 mg/dL    BUN 10 6 - 20 MG/DL    Creatinine 0.71 0.55 - 1.02 MG/DL    BUN/Creatinine ratio 14 12 - 20      GFR est AA >60 >60 ml/min/1.73m2    GFR est non-AA >60 >60 ml/min/1.73m2    Calcium 9.1 8.5 - 10.1 MG/DL    Bilirubin, total 0.5 0.2 - 1.0 MG/DL    ALT (SGPT) 45 12 - 78 U/L    AST (SGOT) 22 15 - 37 U/L    Alk.  phosphatase 46 45 - 117 U/L    Protein, total 6.8 6.4 - 8.2 g/dL    Albumin 3.6 3.5 - 5.0 g/dL    Globulin 3.2 2.0 - 4.0 g/dL    A-G Ratio 1.1 1.1 - 2.2     CBC WITH AUTOMATED DIFF    Collection Time: 05/15/19  9:19 AM   Result Value Ref Range    WBC 4.5 3.6 - 11.0 K/uL    RBC 3.78 (L) 3.80 - 5.20 M/uL    HGB 11.8 11.5 - 16.0 g/dL    HCT 35.2 35.0 - 47.0 %    MCV 93.1 80.0 - 99.0 FL    MCH 31.2 26.0 - 34.0 PG    MCHC 33.5 30.0 - 36.5 g/dL    RDW 13.8 11.5 - 14.5 %    PLATELET 593 373 - 472 K/uL    MPV 9.5 8.9 - 12.9 FL    NRBC 0.0 0  WBC ABSOLUTE NRBC 0.00 0.00 - 0.01 K/uL    NEUTROPHILS 42 32 - 75 %    LYMPHOCYTES 52 (H) 12 - 49 %    MONOCYTES 4 (L) 5 - 13 %    EOSINOPHILS 1 0 - 7 %    BASOPHILS 1 0 - 1 %    IMMATURE GRANULOCYTES 0 0.0 - 0.5 %    ABS. NEUTROPHILS 1.9 1.8 - 8.0 K/UL    ABS. LYMPHOCYTES 2.3 0.8 - 3.5 K/UL    ABS. MONOCYTES 0.2 0.0 - 1.0 K/UL    ABS. EOSINOPHILS 0.1 0.0 - 0.4 K/UL    ABS. BASOPHILS 0.0 0.0 - 0.1 K/UL    ABS. IMM. GRANS. 0.0 0.00 - 0.04 K/UL    DF AUTOMATED       Pre-medications  were administered as ordered and chemotherapy was initiated. Benadryl IV  pepcidIV  Decadron IV  Herceptin IV  Taxol IV      Ms. Moctezuma tolerated treatment well and was discharged from [place] [address] in stable condition. She is to return on  5/22/19 for her next appointment.     [facility]  May 15, 2019

## 2019-05-16 ENCOUNTER — DOCUMENTATION ONLY (OUTPATIENT)
Dept: ONCOLOGY | Age: 54
End: 2019-05-16

## 2019-05-16 NOTE — PROGRESS NOTES
[address]  Social Work Navigator Encounter     Referral placed to [facility] for massage.      Thanks,  [facility], [facility]

## 2019-05-21 RX ORDER — DEXAMETHASONE SODIUM PHOSPHATE 4 MG/ML
4 INJECTION, SOLUTION INTRA-ARTICULAR; INTRALESIONAL; INTRAMUSCULAR; INTRAVENOUS; SOFT TISSUE ONCE
Status: CANCELLED | OUTPATIENT
Start: 2019-05-22

## 2019-05-21 RX ORDER — DIPHENHYDRAMINE HYDROCHLORIDE 50 MG/ML
25 INJECTION, SOLUTION INTRAMUSCULAR; INTRAVENOUS ONCE
Status: CANCELLED
Start: 2019-05-29

## 2019-05-21 RX ORDER — DIPHENHYDRAMINE HYDROCHLORIDE 50 MG/ML
25 INJECTION, SOLUTION INTRAMUSCULAR; INTRAVENOUS ONCE
Status: CANCELLED
Start: 2019-05-22

## 2019-05-21 RX ORDER — DEXAMETHASONE SODIUM PHOSPHATE 4 MG/ML
4 INJECTION, SOLUTION INTRA-ARTICULAR; INTRALESIONAL; INTRAMUSCULAR; INTRAVENOUS; SOFT TISSUE ONCE
Status: CANCELLED | OUTPATIENT
Start: 2019-05-29

## 2019-05-22 ENCOUNTER — HOSPITAL ENCOUNTER (OUTPATIENT)
Dept: INFUSION THERAPY | Age: 54
Discharge: HOME | End: 2019-05-22
Payer: COMMERCIAL

## 2019-05-22 VITALS
RESPIRATION RATE: 18 BRPM | TEMPERATURE: 98 F | BODY MASS INDEX: 23.25 KG/M2 | HEART RATE: 86 BPM | HEIGHT: 66 IN | SYSTOLIC BLOOD PRESSURE: 110 MMHG | DIASTOLIC BLOOD PRESSURE: 78 MMHG | WEIGHT: 144.7 LBS

## 2019-05-22 DIAGNOSIS — Z17.0 MALIGNANT NEOPLASM OF RIGHT BREAST IN FEMALE, ESTROGEN RECEPTOR POSITIVE, UNSPECIFIED SITE OF BREAST (HCC): Primary | ICD-10-CM

## 2019-05-22 DIAGNOSIS — C50.911 MALIGNANT NEOPLASM OF RIGHT BREAST IN FEMALE, ESTROGEN RECEPTOR POSITIVE, UNSPECIFIED SITE OF BREAST (HCC): Primary | ICD-10-CM

## 2019-05-22 LAB
BASO+EOS+MONOS # BLD AUTO: 0.2 K/UL (ref 0.2–1.2)
BASO+EOS+MONOS NFR BLD AUTO: 5 % (ref 3.2–16.9)
DIFFERENTIAL METHOD BLD: ABNORMAL
ERYTHROCYTE [DISTWIDTH] IN BLOOD BY AUTOMATED COUNT: 15.1 % (ref 11.8–15.8)
HCT VFR BLD AUTO: 33.1 % (ref 35–47)
HGB BLD-MCNC: 11.7 G/DL (ref 11.5–16)
LYMPHOCYTES # BLD: 2.1 K/UL (ref 0.8–3.5)
LYMPHOCYTES NFR BLD: 55 % (ref 12–49)
MCH RBC QN AUTO: 33.1 PG (ref 26–34)
MCHC RBC AUTO-ENTMCNC: 35.3 G/DL (ref 30–36.5)
MCV RBC AUTO: 93.8 FL (ref 80–99)
NEUTS SEG # BLD: 1.6 K/UL (ref 1.8–8)
NEUTS SEG NFR BLD: 41 % (ref 32–75)
PLATELET # BLD AUTO: 333 K/UL (ref 150–400)
RBC # BLD AUTO: 3.53 M/UL (ref 3.8–5.2)
WBC # BLD AUTO: 3.9 K/UL (ref 3.6–11)

## 2019-05-22 PROCEDURE — 96417 CHEMO IV INFUS EACH ADDL SEQ: CPT

## 2019-05-22 PROCEDURE — 74011250636 HC RX REV CODE- 250/636: Performed by: INTERNAL MEDICINE

## 2019-05-22 PROCEDURE — 96413 CHEMO IV INFUSION 1 HR: CPT

## 2019-05-22 PROCEDURE — 85025 COMPLETE CBC W/AUTO DIFF WBC: CPT

## 2019-05-22 PROCEDURE — 96375 TX/PRO/DX INJ NEW DRUG ADDON: CPT

## 2019-05-22 PROCEDURE — 77030012965 HC NDL HUBR BBMI -A

## 2019-05-22 PROCEDURE — 74011250636 HC RX REV CODE- 250/636

## 2019-05-22 PROCEDURE — 36415 COLL VENOUS BLD VENIPUNCTURE: CPT

## 2019-05-22 PROCEDURE — 74011250636 HC RX REV CODE- 250/636: Performed by: NURSE PRACTITIONER

## 2019-05-22 PROCEDURE — 74011000250 HC RX REV CODE- 250: Performed by: INTERNAL MEDICINE

## 2019-05-22 RX ORDER — SODIUM CHLORIDE 9 MG/ML
10 INJECTION INTRAMUSCULAR; INTRAVENOUS; SUBCUTANEOUS AS NEEDED
Status: ACTIVE | OUTPATIENT
Start: 2019-05-22 | End: 2019-05-22

## 2019-05-22 RX ORDER — SODIUM CHLORIDE 9 MG/ML
25 INJECTION, SOLUTION INTRAVENOUS CONTINUOUS
Status: DISPENSED | OUTPATIENT
Start: 2019-05-22 | End: 2019-05-22

## 2019-05-22 RX ORDER — DEXAMETHASONE SODIUM PHOSPHATE 4 MG/ML
4 INJECTION, SOLUTION INTRA-ARTICULAR; INTRALESIONAL; INTRAMUSCULAR; INTRAVENOUS; SOFT TISSUE ONCE
Status: COMPLETED | OUTPATIENT
Start: 2019-05-22 | End: 2019-05-22

## 2019-05-22 RX ORDER — HEPARIN 100 UNIT/ML
300-500 SYRINGE INTRAVENOUS AS NEEDED
Status: ACTIVE | OUTPATIENT
Start: 2019-05-22 | End: 2019-05-22

## 2019-05-22 RX ORDER — DIPHENHYDRAMINE HYDROCHLORIDE 50 MG/ML
25 INJECTION, SOLUTION INTRAMUSCULAR; INTRAVENOUS ONCE
Status: COMPLETED | OUTPATIENT
Start: 2019-05-22 | End: 2019-05-22

## 2019-05-22 RX ORDER — SODIUM CHLORIDE 0.9 % (FLUSH) 0.9 %
10 SYRINGE (ML) INJECTION AS NEEDED
Status: ACTIVE | OUTPATIENT
Start: 2019-05-22 | End: 2019-05-22

## 2019-05-22 RX ADMIN — TRASTUZUMAB 130 MG: 150 INJECTION, POWDER, LYOPHILIZED, FOR SOLUTION INTRAVENOUS at 10:35

## 2019-05-22 RX ADMIN — FAMOTIDINE 20 MG: 10 INJECTION, SOLUTION INTRAVENOUS at 09:52

## 2019-05-22 RX ADMIN — PACLITAXEL 139 MG: 300 INJECTION, SOLUTION INTRAVENOUS at 11:30

## 2019-05-22 RX ADMIN — SODIUM CHLORIDE 10 ML: 9 INJECTION, SOLUTION INTRAMUSCULAR; INTRAVENOUS; SUBCUTANEOUS at 09:20

## 2019-05-22 RX ADMIN — Medication 500 UNITS: at 12:35

## 2019-05-22 RX ADMIN — SODIUM CHLORIDE 25 ML/HR: 900 INJECTION, SOLUTION INTRAVENOUS at 09:45

## 2019-05-22 RX ADMIN — Medication 10 ML: at 12:35

## 2019-05-22 RX ADMIN — DIPHENHYDRAMINE HYDROCHLORIDE 25 MG: 50 INJECTION INTRAMUSCULAR; INTRAVENOUS at 09:52

## 2019-05-22 RX ADMIN — DEXAMETHASONE SODIUM PHOSPHATE 4 MG: 4 INJECTION, SOLUTION INTRA-ARTICULAR; INTRALESIONAL; INTRAMUSCULAR; INTRAVENOUS; SOFT TISSUE at 09:52

## 2019-05-22 RX ADMIN — Medication 10 ML: at 09:20

## 2019-05-22 NOTE — PROGRESS NOTES
[facility] Progress Note    Date: May 22, 2019    Name: Horacio Rosado    MRN: [id number]         : 1965    Ms. Marcelo Donohue Arrived ambulatory and in no distress for cycle 2 day 8 of Taxol/Herceptin regimen. Assessment was completed, no acute issues at this time, no new complaints voiced. Port accessed without difficulty, labs drawn and in process. Ms. Moctezuma's vitals were reviewed. Recent Results (from the past 12 hour(s))   CBC WITH 3 PART DIFF    Collection Time: 19  9:23 AM   Result Value Ref Range    WBC 3.9 3.6 - 11.0 K/uL    RBC 3.53 (L) 3.80 - 5.20 M/uL    HGB 11.7 11.5 - 16.0 g/dL    HCT 33.1 (L) 35.0 - 47.0 %    MCV 93.8 80.0 - 99.0 FL    MCH 33.1 26.0 - 34.0 PG    MCHC 35.3 30.0 - 36.5 g/dL    RDW 15.1 11.8 - 15.8 %    PLATELET 809 527 - 315 K/uL    NEUTROPHILS 41 32 - 75 %    MIXED CELLS 5 3.2 - 16.9 %    LYMPHOCYTES 55 (H) 12 - 49 %    ABS. NEUTROPHILS 1.6 (L) 1.8 - 8.0 K/UL    ABS. MIXED CELLS 0.2 0.2 - 1.2 K/uL    ABS. LYMPHOCYTES 2.1 0.8 - 3.5 K/UL    DF AUTOMATED       Lab results were obtained and reviewed. Recent Results (from the past 12 hour(s))   CBC WITH 3 PART DIFF    Collection Time: 19  9:23 AM   Result Value Ref Range    WBC 3.9 3.6 - 11.0 K/uL    RBC 3.53 (L) 3.80 - 5.20 M/uL    HGB 11.7 11.5 - 16.0 g/dL    HCT 33.1 (L) 35.0 - 47.0 %    MCV 93.8 80.0 - 99.0 FL    MCH 33.1 26.0 - 34.0 PG    MCHC 35.3 30.0 - 36.5 g/dL    RDW 15.1 11.8 - 15.8 %    PLATELET 817 028 - 744 K/uL    NEUTROPHILS 41 32 - 75 %    MIXED CELLS 5 3.2 - 16.9 %    LYMPHOCYTES 55 (H) 12 - 49 %    ABS. NEUTROPHILS 1.6 (L) 1.8 - 8.0 K/UL    ABS. MIXED CELLS 0.2 0.2 - 1.2 K/uL    ABS.  LYMPHOCYTES 2.1 0.8 - 3.5 K/UL    DF AUTOMATED         Medications:  Medications Administered     0.9% sodium chloride infusion     Admin Date  2019 Action  New Bag Dose  25 mL/hr Rate  25 mL/hr Route  IntraVENous Administered By  James Bravo, RN          dexamethasone (DECADRON) 4 mg/mL injection 4 mg     Admin Date  05/22/2019 Action  Given Dose  4 mg Route  IntraVENous Administered By  Taiwo Jett RN          diphenhydrAMINE (BENADRYL) injection 25 mg     Admin Date  05/22/2019 Action  Given Dose  25 mg Route  IntraVENous Administered By  Taiwo Jett RN          famotidine (PF) (PEPCID) 20 mg in sodium chloride 0.9% 10 mL injection     Admin Date  05/22/2019 Action  Given Dose  20 mg Route  IntraVENous Administered By  Taiwo Jett RN          heparin (porcine) pf 300-500 Units     Admin Date  05/22/2019 Action  Given Dose  500 Units Route  InterCATHeter Administered By  Taiwo Jett RN          PACLitaxel (TAXOL) 139 mg in 0.9% sodium chloride 250 mL, overfill volume 25 mL chemo infusion     Admin Date  05/22/2019 Action  New Bag Dose  139 mg Rate  298.2 mL/hr Route  IntraVENous Administered By  Taiwo Jett RN          saline peripheral flush soln 10 mL     Admin Date  05/22/2019 Action  Given Dose  10 mL Route  InterCATHeter Administered By  Taiwo Jett RN           Admin Date  05/22/2019 Action  Given Dose  10 mL Route  InterCATHeter Administered By  Taiwo Jett RN          sodium chloride 0.9% injection 10 mL     Admin Date  05/22/2019 Action  Given Dose  10 mL Route  IntraVENous Administered By  Taiwo Jett RN          trastuzumab (HERCEPTIN) 130 mg in 0.9% sodium chloride 250 mL, overfill volume 25 mL IVPB     Admin Date  05/22/2019 Action  New Bag Dose  130 mg Rate  562.4 mL/hr Route  IntraVENous Administered By  Taiwo Jett RN                Ms. Gweneth Goldmann tolerated treatment well and was discharged from [place] [address] in stable condition. Port de-accessed, flushed & heparinized per protocol. She is to return on 5/29/19 at 9:00 for her next appointment.     Brian Wlilingham RN  May 22, 2019

## 2019-05-22 NOTE — PROGRESS NOTES
[facility] - Chemotherapy Progress Note    0900 Pt admit to [facility] for C 1 Taxol/Herceptin ambulatory in stable condition. Assessment completed. No new concerns voiced. PAC placed 4/18/19 PAC with positive blood return but not enough for labs. Labs taken peripherally and Cath Darcy instilled. Echo done 4/11/19 64%. Labs obtained and patient proceeded to scheduled MD appointment. Consent obtained by MD and to be scanned into the chart. PT declines pregnancy  RN reviewed chemotherapy discharge instructions sheet, when to call the MD, [facility] general instructions and side effects for medications. Pt verbalized understanding. Medications:    Cath darcy ivp  Dexamethasone ivp  Benadryl ivp  pepcid ivp    Herceptin iv  Taxol iv    AVS and patient appointment sheet reviewed with patient. Pt tolerated treatment well. PAC maintained positive blood return throughout treatment, flushed with positive blood return at conclusion and throughout treatment. D/c home ambulatory in no distress. Pt aware of next appointment scheduled for 4/29/19. Recent Results (from the past 12 hour(s))   CBC WITH 3 PART DIFF    Collection Time: 05/22/19  9:23 AM   Result Value Ref Range    WBC 3.9 3.6 - 11.0 K/uL    RBC 3.53 (L) 3.80 - 5.20 M/uL    HGB 11.7 11.5 - 16.0 g/dL    HCT 33.1 (L) 35.0 - 47.0 %    MCV 93.8 80.0 - 99.0 FL    MCH 33.1 26.0 - 34.0 PG    MCHC 35.3 30.0 - 36.5 g/dL    RDW 15.1 11.8 - 15.8 %    PLATELET 759 800 - 569 K/uL    NEUTROPHILS 41 32 - 75 %    MIXED CELLS 5 3.2 - 16.9 %    LYMPHOCYTES 55 (H) 12 - 49 %    ABS. NEUTROPHILS 1.6 (L) 1.8 - 8.0 K/UL    ABS. MIXED CELLS 0.2 0.2 - 1.2 K/uL    ABS.  LYMPHOCYTES 2.1 0.8 - 3.5 K/UL    DF AUTOMATED

## 2019-05-29 ENCOUNTER — OFFICE VISIT (OUTPATIENT)
Dept: ONCOLOGY | Age: 54
End: 2019-05-29

## 2019-05-29 ENCOUNTER — HOSPITAL ENCOUNTER (OUTPATIENT)
Dept: INFUSION THERAPY | Age: 54
Discharge: HOME | End: 2019-05-29
Payer: COMMERCIAL

## 2019-05-29 VITALS
HEART RATE: 80 BPM | TEMPERATURE: 97.7 F | HEIGHT: 66 IN | SYSTOLIC BLOOD PRESSURE: 121 MMHG | WEIGHT: 145.5 LBS | BODY MASS INDEX: 23.38 KG/M2 | OXYGEN SATURATION: 99 % | RESPIRATION RATE: 18 BRPM | DIASTOLIC BLOOD PRESSURE: 68 MMHG

## 2019-05-29 VITALS
OXYGEN SATURATION: 98 % | HEART RATE: 86 BPM | DIASTOLIC BLOOD PRESSURE: 73 MMHG | BODY MASS INDEX: 23.38 KG/M2 | HEIGHT: 66 IN | WEIGHT: 145.5 LBS | TEMPERATURE: 98.8 F | RESPIRATION RATE: 18 BRPM | SYSTOLIC BLOOD PRESSURE: 115 MMHG

## 2019-05-29 DIAGNOSIS — Z17.0 MALIGNANT NEOPLASM OF RIGHT BREAST IN FEMALE, ESTROGEN RECEPTOR POSITIVE, UNSPECIFIED SITE OF BREAST (HCC): Primary | ICD-10-CM

## 2019-05-29 DIAGNOSIS — C50.511 MALIGNANT NEOPLASM OF LOWER-OUTER QUADRANT OF RIGHT BREAST OF FEMALE, ESTROGEN RECEPTOR POSITIVE (HCC): Primary | ICD-10-CM

## 2019-05-29 DIAGNOSIS — Z17.0 MALIGNANT NEOPLASM OF LOWER-OUTER QUADRANT OF RIGHT BREAST OF FEMALE, ESTROGEN RECEPTOR POSITIVE (HCC): Primary | ICD-10-CM

## 2019-05-29 DIAGNOSIS — C50.911 MALIGNANT NEOPLASM OF RIGHT BREAST IN FEMALE, ESTROGEN RECEPTOR POSITIVE, UNSPECIFIED SITE OF BREAST (HCC): Primary | ICD-10-CM

## 2019-05-29 LAB
BASOPHILS # BLD: 0 K/UL (ref 0–0.1)
BASOPHILS NFR BLD: 1 % (ref 0–1)
DIFFERENTIAL METHOD BLD: ABNORMAL
EOSINOPHIL # BLD: 0.1 K/UL (ref 0–0.4)
EOSINOPHIL NFR BLD: 2 % (ref 0–7)
ERYTHROCYTE [DISTWIDTH] IN BLOOD BY AUTOMATED COUNT: 14.7 % (ref 11.5–14.5)
HCT VFR BLD AUTO: 31.6 % (ref 35–47)
HGB BLD-MCNC: 10.5 G/DL (ref 11.5–16)
IMM GRANULOCYTES # BLD AUTO: 0 K/UL (ref 0–0.04)
IMM GRANULOCYTES NFR BLD AUTO: 1 % (ref 0–0.5)
LYMPHOCYTES # BLD: 2.1 K/UL (ref 0.8–3.5)
LYMPHOCYTES NFR BLD: 52 % (ref 12–49)
MCH RBC QN AUTO: 31.2 PG (ref 26–34)
MCHC RBC AUTO-ENTMCNC: 33.2 G/DL (ref 30–36.5)
MCV RBC AUTO: 93.8 FL (ref 80–99)
MONOCYTES # BLD: 0.2 K/UL (ref 0–1)
MONOCYTES NFR BLD: 5 % (ref 5–13)
NEUTS SEG # BLD: 1.6 K/UL (ref 1.8–8)
NEUTS SEG NFR BLD: 39 % (ref 32–75)
NRBC # BLD: 0 K/UL (ref 0–0.01)
NRBC BLD-RTO: 0 PER 100 WBC
PLATELET # BLD AUTO: 360 K/UL (ref 150–400)
PMV BLD AUTO: 9.3 FL (ref 8.9–12.9)
RBC # BLD AUTO: 3.37 M/UL (ref 3.8–5.2)
WBC # BLD AUTO: 4 K/UL (ref 3.6–11)

## 2019-05-29 PROCEDURE — 74011000250 HC RX REV CODE- 250: Performed by: INTERNAL MEDICINE

## 2019-05-29 PROCEDURE — 96375 TX/PRO/DX INJ NEW DRUG ADDON: CPT

## 2019-05-29 PROCEDURE — 74011250636 HC RX REV CODE- 250/636: Performed by: NURSE PRACTITIONER

## 2019-05-29 PROCEDURE — 36415 COLL VENOUS BLD VENIPUNCTURE: CPT

## 2019-05-29 PROCEDURE — 96417 CHEMO IV INFUS EACH ADDL SEQ: CPT

## 2019-05-29 PROCEDURE — 74011250636 HC RX REV CODE- 250/636

## 2019-05-29 PROCEDURE — 85025 COMPLETE CBC W/AUTO DIFF WBC: CPT

## 2019-05-29 PROCEDURE — 74011250636 HC RX REV CODE- 250/636: Performed by: INTERNAL MEDICINE

## 2019-05-29 PROCEDURE — 77030012965 HC NDL HUBR BBMI -A

## 2019-05-29 PROCEDURE — 96413 CHEMO IV INFUSION 1 HR: CPT

## 2019-05-29 RX ORDER — DEXAMETHASONE SODIUM PHOSPHATE 4 MG/ML
4 INJECTION, SOLUTION INTRA-ARTICULAR; INTRALESIONAL; INTRAMUSCULAR; INTRAVENOUS; SOFT TISSUE ONCE
Status: COMPLETED | OUTPATIENT
Start: 2019-05-29 | End: 2019-05-29

## 2019-05-29 RX ORDER — SODIUM CHLORIDE 9 MG/ML
10 INJECTION INTRAMUSCULAR; INTRAVENOUS; SUBCUTANEOUS AS NEEDED
Status: ACTIVE | OUTPATIENT
Start: 2019-05-29 | End: 2019-05-29

## 2019-05-29 RX ORDER — HEPARIN 100 UNIT/ML
300-500 SYRINGE INTRAVENOUS AS NEEDED
Status: ACTIVE | OUTPATIENT
Start: 2019-05-29 | End: 2019-05-29

## 2019-05-29 RX ORDER — SODIUM CHLORIDE 0.9 % (FLUSH) 0.9 %
10 SYRINGE (ML) INJECTION AS NEEDED
Status: ACTIVE | OUTPATIENT
Start: 2019-05-29 | End: 2019-05-29

## 2019-05-29 RX ORDER — SODIUM CHLORIDE 9 MG/ML
25 INJECTION, SOLUTION INTRAVENOUS CONTINUOUS
Status: DISPENSED | OUTPATIENT
Start: 2019-05-29 | End: 2019-05-29

## 2019-05-29 RX ORDER — DIPHENHYDRAMINE HYDROCHLORIDE 50 MG/ML
25 INJECTION, SOLUTION INTRAMUSCULAR; INTRAVENOUS ONCE
Status: COMPLETED | OUTPATIENT
Start: 2019-05-29 | End: 2019-05-29

## 2019-05-29 RX ADMIN — FAMOTIDINE 20 MG: 10 INJECTION, SOLUTION INTRAVENOUS at 11:52

## 2019-05-29 RX ADMIN — Medication 500 UNITS: at 14:59

## 2019-05-29 RX ADMIN — DEXAMETHASONE SODIUM PHOSPHATE 4 MG: 4 INJECTION, SOLUTION INTRA-ARTICULAR; INTRALESIONAL; INTRAMUSCULAR; INTRAVENOUS; SOFT TISSUE at 11:55

## 2019-05-29 RX ADMIN — TRASTUZUMAB 130 MG: 150 INJECTION, POWDER, LYOPHILIZED, FOR SOLUTION INTRAVENOUS at 12:48

## 2019-05-29 RX ADMIN — Medication 10 ML: at 14:59

## 2019-05-29 RX ADMIN — SODIUM CHLORIDE 25 ML/HR: 900 INJECTION, SOLUTION INTRAVENOUS at 12:39

## 2019-05-29 RX ADMIN — DIPHENHYDRAMINE HYDROCHLORIDE 25 MG: 50 INJECTION INTRAMUSCULAR; INTRAVENOUS at 11:48

## 2019-05-29 RX ADMIN — PACLITAXEL 139 MG: 6 INJECTION, SOLUTION INTRAVENOUS at 13:35

## 2019-05-29 NOTE — PROGRESS NOTES
[facility] [place] at [place] [address] [address] [facility]  Karen Lied: [phone]  F: [phone]      Reason for Visit:   Nikita Cook is a 52 y.o. female who is seen in consultation at the request of Dr. Viridiana Arana for evaluation of systemic therapy for breast cancer. Consulting physician:  Dr. Cheryl Lomas    Treatment History:   · 12/10/18 right breast US bx:  IMC, gr 1, 0.12 cm (1.2 mm); insufficient for receptors  · 1/25/19 right breast core bx:  11:00 lobular intraepithelial neoplasia; right breast core bx 7:00:  DCIS, gr 2-3, cribriform, 1.4 cm, ER + at 94%, SD + at 54%  · 2/28/19 right mastectomy : LOQ IDC, 1.4 cm, ER + at 89%, SD + at 78%, HER 2 POSITIVE (IHC 2+, FISH ratio 3.3; sig/cell 9.2) ; ki67 22%, gr 2, DCIS present and extensive, 0/2 LN; pT1c pN0 cM0  · Myriad Myrisk negative  · TH 4/22/19-    History of Present Illness: An abnormal mammogram 11/2018 led to the pathology above. Interval history:  In today for follow up. Complains of gr 1 bleeding, gr 1 diarrhea, gr 1 fatigue, gr 1 hot flashes, gr 1 insomnia, gr 1 loss of cognition and concentration, 3/10 shoulder pain, gr 1 headache.     FH:  Mother with breast cancer at age 45s and 46s; maternal aunt with breast cancer in her 46s; maternal aunt with breast cancer in her 46s; no ovarian, prostate, pancreas cancer    LMP 11/2018, spotty prior to that    Past Medical History:   Diagnosis Date    Adverse effect of anesthesia 1995    difficulty awakening      Past Surgical History:   Procedure Laterality Date    BREAST SURGERY PROCEDURE UNLISTED Left 1997    lumpectomy no lymph    COLONOSCOPY Left 12/14/2018    COLONOSCOPY performed by Peyton Burris MD at [facility] ENDOSCOPY    HX ORTHOPAEDIC      foot surgery    IR INSERT TUNL CVC W PORT OVER 5 YEARS  4/18/2019      Social History     Tobacco Use    Smoking status: Former Smoker     Packs/day: 1.00     Years: 20.00     Pack years: 20.00     Types: Cigarettes Last attempt to quit: 04/2004     Years since quitting: 15.1    Smokeless tobacco: Never Used   Substance Use Topics    Alcohol use: Yes     Alcohol/week: 8.4 oz     Types: 14 Glasses of wine per week      Family History   Problem Relation Age of Onset    Breast Cancer Mother         42's 1st time late 52's 2nd time   [facility] Cancer Mother         breast CA x2    Hypertension Mother     Heart Disease Father     Cancer Maternal Aunt         breast    Cancer Maternal Aunt         breast    Cancer Maternal Cousin         Breast     Current Outpatient Medications   Medication Sig    OTHER Massage    Dx. C50.511 breast cancer    TURMERIC PO Take 2 Caps by mouth daily.  spironolactone (ALDACTONE) 25 mg tablet Take  by mouth daily.  vit B Cmplx 3-FA-Vit C-Biotin (NEPHRO VINOD RX) 1- mg-mg-mcg tablet Take 1 Tab by mouth daily.  omega 3-dha-epa-fish oil (FISH OIL) 100-160-1,000 mg cap Take 1 Cap by mouth daily.  prochlorperazine (COMPAZINE) 10 mg tablet Take 1 Tab by mouth every six (6) hours as needed for Nausea.  lidocaine-prilocaine (EMLA) topical cream Apply  to affected area as needed for Pain.  ondansetron hcl (ZOFRAN) 8 mg tablet Take 1 Tab by mouth every twelve (12) hours as needed for Nausea.  ALPRAZolam (XANAX) 0.25 mg tablet Take 0.25 mg by mouth. No current facility-administered medications for this visit. Allergies   Allergen Reactions    Ancef [Cefazolin] Hives    Penicillins Hives        Review of Systems: A complete review of systems was obtained, negative except as described above.     Physical Exam:     Visit Vitals  /73   Pulse 86   Temp 98.8 °F (37.1 °C) (Temporal)   Resp 18   Ht 5' 6\" (1.676 m)   Wt 145 lb 8 oz (66 kg)   SpO2 98%   BMI 23.48 kg/m²     ECOG PS: 0  General: No distress  Eyes: PERRLA, anicteric sclerae  HENT: Atraumatic, OP clear  Neck: Supple  Lymphatic: No cervical, supraclavicular, or inguinal adenopathy  Respiratory: CTAB, normal respiratory effort  CV: Normal rate, regular rhythm, no murmurs, no peripheral edema  GI: Soft, nontender, nondistended, no masses, no hepatomegaly, no splenomegaly  MS: Normal gait and station. Digits without clubbing or cyanosis. Skin: No rashes, ecchymoses, or petechiae. Normal temperature, turgor, and texture. Psych: Alert, oriented, appropriate affect, normal judgment/insight        Results:     Lab Results   Component Value Date/Time    WBC 4.0 05/29/2019 09:34 AM    HGB 10.5 (L) 05/29/2019 09:34 AM    HCT 31.6 (L) 05/29/2019 09:34 AM    PLATELET 608 19/71/5882 09:34 AM    MCV 93.8 05/29/2019 09:34 AM    ABS. NEUTROPHILS 1.6 (L) 05/29/2019 09:34 AM     Lab Results   Component Value Date/Time    Sodium 139 05/15/2019 09:19 AM    Potassium 3.7 05/15/2019 09:19 AM    Chloride 105 05/15/2019 09:19 AM    CO2 29 05/15/2019 09:19 AM    Glucose 104 (H) 05/15/2019 09:19 AM    BUN 10 05/15/2019 09:19 AM    Creatinine 0.71 05/15/2019 09:19 AM    GFR est AA >60 05/15/2019 09:19 AM    GFR est non-AA >60 05/15/2019 09:19 AM    Calcium 9.1 05/15/2019 09:19 AM     Lab Results   Component Value Date/Time    Bilirubin, total 0.5 05/15/2019 09:19 AM    ALT (SGPT) 45 05/15/2019 09:19 AM    AST (SGOT) 22 05/15/2019 09:19 AM    Alk. phosphatase 46 05/15/2019 09:19 AM    Protein, total 6.8 05/15/2019 09:19 AM    Albumin 3.6 05/15/2019 09:19 AM    Globulin 3.2 05/15/2019 09:19 AM         Records reviewed and summarized above. Pathology report(s) reviewed above. Radiology report(s) reviewed above. Assessment/plan:   1. Right LOQ IDC, 1.4 cm, gr 2, 0/2 LN, ER +, NE +, HER 2 POSITIVE:  Stage IA (both anatomic and prognostic). Likely postmenopausal    We explained to the patient that the goal of systemic adjuvant therapy is to improve the chances for cure and decrease the risk of relapse.  We explained why a patient can have microscopic cancer spread now even though physical examination, laboratory studies and imaging studies are negative for cancer. We explained that the same treatments used now as adjuvant or preventive treatments rarely if ever are curative in women who develop metastases. Portia Frye suggests equivalency between q.3 week Adriamycin, Cytoxan followed by weekly paclitaxel and trastuzumab compared with the [facility] regimen. However, this study was not powered to show a difference between these two regimens, and in the [facility] publication, the AC-TH arm showed a numerically advantange (though not statistically significant) to the [facility] arm. In this patient, it is completely reasonable to use [facility] approach and avoid the potential cardiotoxicity of the anthracyclines as well as the potential for leukemia. We discussed the toxicities of docetaxel and carboplatin chemotherapy in detail. This chemotherapy frequently causes a low white blood cell count and hospital admissions for treatment of neutropenic fever. We explained that we consider the use of growth factors to minimize this risk. We explained to the patient that some side effects if they occur only last a few days including nausea, vomiting, stomatitis, arthralgia, myalgia,and allergic reactions to Taxotere. We told the patient that severe nausea and vomiting were uncommon and that some side effects,if they occur, will last longer; this includes hair loss, which will be seen in all patients treated with these agents and fatigue,which will be seen in most.  We also informed that for the patient that heart damage is rare with these agents. We explained that carboplatin can rarely cause kidney damage and high frequency hearing loss. We provided the patient in detail her information concerning the toxicities of this regimen in addition to her overall discussion.       Rationale for therapy with trastuzumab was also discussed with the patient including a 50% proportional improvement in disease free survival and also an improvement in overall survival in patients receiving trastuzumab and chemotherapy for HER-2 positive breast cancer. The side effects of trastuzumab were discussed including a 4%-5% risk of dropping her ejection fraction while on treatment and about a 1% risk of CHF. We discussed that this drug will be used every 3 weeks for remainder of a year following the chemotherapy cycles. We will check her EF before chemotherapy and every 3 months while she is receiving trastuzumab. · TCH (Trastuzumab 8mg/kg load with cycle 1 then 6mg/kg, Docetaxel 75mg/m2, Carboplatin AUC 6) given every 3 weeks x 6 cycles  · Labs: CBC, CMP prior to each treatment  · Antiemetic Prophylaxis: Palonosetron and dexamethasone prior to chemo  · PRN Antiemetics: Ondansetron, Compazine  · Swelling prophylaxis: Dexmethasone 8mg bid the day before, and day after chemotherapy  · TTE prior to chemotherapy and every 12 weeks while on Trastuzumab  · Neulasta 24-72 hours after each treatment    Also discussed the APT study results, weekly paclitaxel 80 mg/m2 x 12 with weekly trastuzumab (4 mg/kg load then 2 mg/kg)  followed by outback trastuzumab to complete one year. 42% were pT1c. I discussed the potential risks of paclitaxel chemotherapy with the patient. Major toxicities include nausea and vomiting, stomatitis, fatigue. We provided the patient with detailed information concerning toxicity including frequent toxicities that only last a few days, such as nausea, vomiting, mouth sores, arthralgia, myalgia, and potentially allergic reactions to paclitaxel, as well as toxicities which can be longer lasting including total alopecia, fatigue, anemia and neuropathy. We provided the patient with detailed information concerning the toxicities of their regimen in addition to our verbal discussion.     This is a reasonable regimen in this setting (only 4 distant recurrences over 7 years in this study, 7 year DFS was 95%)     After this discussion, she is agreeable to paclitaxel and trastuzumab as in APT, will start on 4/22. She has signed informed consent. TTE on 4/11/19, EF 64%. Port was placed. TH C2D1 today. 6/12 today    The patient was given the following prescriptions with written and verbal instructions on how to use each:  Compazine, zofran,  a wig, and emla cream.      Discussed cold caps and cryotherapy with paclitaxel. She will not require XRT. Discussed endocrine therapy. The risks and benefits of tamoxifen were discussed in detail and the patient was informed of the following: Risks include a 1% risk of endometrial cancer for postmenopausal women treated for five years but no (or a minimally increased) risk in premenopausal women and that most women who develop tamoxifen-associated endometrial cancer can be cured. Any bleeding in a postmenopausal woman should be reported to a health care professional. There is also a 1% risk of blood clots (thromboembolism) that can be fatal. All patients irrespective of age who take tamoxifen and who have not had a hysterectomy should have a pelvic exam and Pap smear yearly. Tamoxifen increases the risk of cataract formation and on rare occasions has caused retinal damage: an eye exam is recommended yearly. Other risks include vaginal discharge or dryness, the development or worsening of hot flashes or vasomotor symptoms, and bone loss in premenopausal women. There is excellent evidence that tamoxifen does not increase risk of depression, cause weight gain or have a major effect on sexual function. Available data suggests little or no effect on cognitive function. Benefits include a lowering of cholesterol and a reduction in the rate of bone loss for postmenopausal woman. Any other symptoms should be reported.     The risks and benefits of aromatase inhibitors (anastrozole, letrozole, and exemestane) were discussed in detail and the patient was informed of the following: Risks include the development of painful muscles and joints (arthralgia/myalgia) and bone loss. Muscle and joint pain can be severe but rarely result in any tissue damage; symptoms usually resolve in several weeks when the medication is stopped. Bone loss is common and a bone density test is recommended as a baseline and then yearly to every several years depending on initial results. The risk of fractures is increased by a few percent in patients taking these drugs, but careful monitoring of bone density and using bone protecting agents when indicated can minimize these risks. Unlike tamoxifen there is no increased risk of blood clots or endometrial cancer. AIs can cause or worsen vaginal dryness but women using these drugs should not use vaginal estrogen preparations for these symptoms. AIs can also cause or increase hot flashes. Any other symptoms should be reported. Will make a final decision on endocrine therapy following chemotherapy. May need to check LH, FSH, estradiol, though likely postmenopausal    Follow-up after early breast cancer was discussed. I recommend follow-up as defined by the American Society of Clinical Oncology and [facility] [place]. This includes a visit to a health care professional every 3-6 months for 3 years, then every 6-12 months for 2 years, and then yearly as well as mammograms yearly. 2. Emotional well being:  She has excellent support and is coping well with her disease. 3. Diarrhea: Due to chemo. Intermittent. PRN Imodium. Will monitor. 4. Right arm pain:  Ongoing prior to treatment, intermittent. May be due to surgery. Previously referred to [facility], PT. She has met with Madelin Gonzalez PT and was given exercises which have helped. Will monitor. 5. Nosebleeds:  Due to chemo. Minor. Recommend nasal saline PRN. Will monitor. 6. Headaches:  Mild; likely due to therapy/antiemetics          I appreciate the opportunity to participate in Ms. Keisha Moctezuma's care.     Signed By: Chandler Howard MD      No orders of the defined types were placed in this encounter.

## 2019-05-29 NOTE — PROGRESS NOTES
0900 Pt admit to [address] [place] [address] for weekly Taxol/Herceptin ambulatory in stable condition. Assessment completed. No new concerns voiced. Port with positive blood return when pt leaned forward in the chair. Visit Vitals  BP (P) 115/73 (BP 1 Location: Left arm, BP Patient Position: At rest;Sitting)   Pulse (P) 86   Temp (P) 98.8 °F (37.1 °C)   Resp (P) 18   Ht (P) 5' 6\" (1.676 m)   Wt (P) 66 kg (145 lb 8 oz)   SpO2 (P) 98%   Breastfeeding? No   BMI (P) 23.48 kg/m²       Medications:  [email or web address]  Decadron IV  Benadryl IV  Pepcid IV  Herceptin IV  Taxol IV    1505 Pt tolerated treatment well. Port maintained positive blood return throughout treatment, flushed with positive blood return at conclusion. D/c home ambulatory in no distress. Pt aware of next [facility] appointment scheduled for 6/5/19. Recent Results (from the past 12 hour(s))   CBC WITH AUTOMATED DIFF    Collection Time: 05/29/19  9:34 AM   Result Value Ref Range    WBC 4.0 3.6 - 11.0 K/uL    RBC 3.37 (L) 3.80 - 5.20 M/uL    HGB 10.5 (L) 11.5 - 16.0 g/dL    HCT 31.6 (L) 35.0 - 47.0 %    MCV 93.8 80.0 - 99.0 FL    MCH 31.2 26.0 - 34.0 PG    MCHC 33.2 30.0 - 36.5 g/dL    RDW 14.7 (H) 11.5 - 14.5 %    PLATELET 843 087 - 624 K/uL    MPV 9.3 8.9 - 12.9 FL    NRBC 0.0 0  WBC    ABSOLUTE NRBC 0.00 0.00 - 0.01 K/uL    NEUTROPHILS 39 32 - 75 %    LYMPHOCYTES 52 (H) 12 - 49 %    MONOCYTES 5 5 - 13 %    EOSINOPHILS 2 0 - 7 %    BASOPHILS 1 0 - 1 %    IMMATURE GRANULOCYTES 1 (H) 0.0 - 0.5 %    ABS. NEUTROPHILS 1.6 (L) 1.8 - 8.0 K/UL    ABS. LYMPHOCYTES 2.1 0.8 - 3.5 K/UL    ABS. MONOCYTES 0.2 0.0 - 1.0 K/UL    ABS. EOSINOPHILS 0.1 0.0 - 0.4 K/UL    ABS. BASOPHILS 0.0 0.0 - 0.1 K/UL    ABS. IMM.  GRANS. 0.0 0.00 - 0.04 K/UL    DF AUTOMATED

## 2019-06-05 ENCOUNTER — HOSPITAL ENCOUNTER (OUTPATIENT)
Dept: INFUSION THERAPY | Age: 54
Discharge: HOME | End: 2019-06-05
Payer: COMMERCIAL

## 2019-06-05 VITALS
HEIGHT: 66 IN | DIASTOLIC BLOOD PRESSURE: 63 MMHG | RESPIRATION RATE: 18 BRPM | BODY MASS INDEX: 23.18 KG/M2 | SYSTOLIC BLOOD PRESSURE: 130 MMHG | HEART RATE: 78 BPM | WEIGHT: 144.2 LBS | TEMPERATURE: 98.1 F

## 2019-06-05 DIAGNOSIS — Z17.0 MALIGNANT NEOPLASM OF RIGHT BREAST IN FEMALE, ESTROGEN RECEPTOR POSITIVE, UNSPECIFIED SITE OF BREAST (HCC): Primary | ICD-10-CM

## 2019-06-05 DIAGNOSIS — C50.911 MALIGNANT NEOPLASM OF RIGHT BREAST IN FEMALE, ESTROGEN RECEPTOR POSITIVE, UNSPECIFIED SITE OF BREAST (HCC): Primary | ICD-10-CM

## 2019-06-05 LAB
ALBUMIN SERPL-MCNC: 3.4 G/DL (ref 3.5–5)
ALBUMIN/GLOB SERPL: 1.3 {RATIO} (ref 1.1–2.2)
ALP SERPL-CCNC: 39 U/L (ref 45–117)
ALT SERPL-CCNC: 28 U/L (ref 12–78)
ANION GAP SERPL CALC-SCNC: 6 MMOL/L (ref 5–15)
AST SERPL-CCNC: 20 U/L (ref 15–37)
BASO+EOS+MONOS # BLD AUTO: 0.3 K/UL (ref 0.2–1.2)
BASO+EOS+MONOS NFR BLD AUTO: 7 % (ref 3.2–16.9)
BILIRUB SERPL-MCNC: 0.5 MG/DL (ref 0.2–1)
BUN SERPL-MCNC: 11 MG/DL (ref 6–20)
BUN/CREAT SERPL: 15 (ref 12–20)
CALCIUM SERPL-MCNC: 8.9 MG/DL (ref 8.5–10.1)
CHLORIDE SERPL-SCNC: 105 MMOL/L (ref 97–108)
CO2 SERPL-SCNC: 27 MMOL/L (ref 21–32)
CREAT SERPL-MCNC: 0.75 MG/DL (ref 0.55–1.02)
DIFFERENTIAL METHOD BLD: ABNORMAL
ERYTHROCYTE [DISTWIDTH] IN BLOOD BY AUTOMATED COUNT: 16.4 % (ref 11.8–15.8)
GLOBULIN SER CALC-MCNC: 2.6 G/DL (ref 2–4)
GLUCOSE SERPL-MCNC: 98 MG/DL (ref 65–100)
HCT VFR BLD AUTO: 31.8 % (ref 35–47)
HGB BLD-MCNC: 11 G/DL (ref 11.5–16)
LYMPHOCYTES # BLD: 2.1 K/UL (ref 0.8–3.5)
LYMPHOCYTES NFR BLD: 45 % (ref 12–49)
MCH RBC QN AUTO: 33 PG (ref 26–34)
MCHC RBC AUTO-ENTMCNC: 34.6 G/DL (ref 30–36.5)
MCV RBC AUTO: 95.5 FL (ref 80–99)
NEUTS SEG # BLD: 2.2 K/UL (ref 1.8–8)
NEUTS SEG NFR BLD: 48 % (ref 32–75)
PLATELET # BLD AUTO: 390 K/UL (ref 150–400)
POTASSIUM SERPL-SCNC: 3.9 MMOL/L (ref 3.5–5.1)
PROT SERPL-MCNC: 6 G/DL (ref 6.4–8.2)
RBC # BLD AUTO: 3.33 M/UL (ref 3.8–5.2)
SODIUM SERPL-SCNC: 138 MMOL/L (ref 136–145)
WBC # BLD AUTO: 4.6 K/UL (ref 3.6–11)

## 2019-06-05 PROCEDURE — 85025 COMPLETE CBC W/AUTO DIFF WBC: CPT

## 2019-06-05 PROCEDURE — 74011250636 HC RX REV CODE- 250/636: Performed by: INTERNAL MEDICINE

## 2019-06-05 PROCEDURE — 77030012965 HC NDL HUBR BBMI -A

## 2019-06-05 PROCEDURE — 96413 CHEMO IV INFUSION 1 HR: CPT

## 2019-06-05 PROCEDURE — 74011250636 HC RX REV CODE- 250/636

## 2019-06-05 PROCEDURE — 74011250636 HC RX REV CODE- 250/636: Performed by: NURSE PRACTITIONER

## 2019-06-05 PROCEDURE — 96375 TX/PRO/DX INJ NEW DRUG ADDON: CPT

## 2019-06-05 PROCEDURE — 80053 COMPREHEN METABOLIC PANEL: CPT

## 2019-06-05 PROCEDURE — 36415 COLL VENOUS BLD VENIPUNCTURE: CPT

## 2019-06-05 RX ORDER — SODIUM CHLORIDE 9 MG/ML
10 INJECTION INTRAMUSCULAR; INTRAVENOUS; SUBCUTANEOUS AS NEEDED
Status: ACTIVE | OUTPATIENT
Start: 2019-06-05 | End: 2019-06-05

## 2019-06-05 RX ORDER — SODIUM CHLORIDE 9 MG/ML
25 INJECTION, SOLUTION INTRAVENOUS CONTINUOUS
Status: DISPENSED | OUTPATIENT
Start: 2019-06-05 | End: 2019-06-05

## 2019-06-05 RX ORDER — DIPHENHYDRAMINE HYDROCHLORIDE 50 MG/ML
25 INJECTION, SOLUTION INTRAMUSCULAR; INTRAVENOUS ONCE
Status: COMPLETED | OUTPATIENT
Start: 2019-06-05 | End: 2019-06-05

## 2019-06-05 RX ORDER — DEXAMETHASONE SODIUM PHOSPHATE 4 MG/ML
4 INJECTION, SOLUTION INTRA-ARTICULAR; INTRALESIONAL; INTRAMUSCULAR; INTRAVENOUS; SOFT TISSUE ONCE
Status: COMPLETED | OUTPATIENT
Start: 2019-06-05 | End: 2019-06-05

## 2019-06-05 RX ORDER — HEPARIN 100 UNIT/ML
300-500 SYRINGE INTRAVENOUS AS NEEDED
Status: ACTIVE | OUTPATIENT
Start: 2019-06-05 | End: 2019-06-05

## 2019-06-05 RX ORDER — SODIUM CHLORIDE 0.9 % (FLUSH) 0.9 %
10 SYRINGE (ML) INJECTION AS NEEDED
Status: ACTIVE | OUTPATIENT
Start: 2019-06-05 | End: 2019-06-05

## 2019-06-05 RX ADMIN — Medication 10 ML: at 12:10

## 2019-06-05 RX ADMIN — PACLITAXEL 139 MG: 6 INJECTION, SOLUTION INTRAVENOUS at 10:57

## 2019-06-05 RX ADMIN — FAMOTIDINE 20 MG: 10 INJECTION, SOLUTION INTRAVENOUS at 09:58

## 2019-06-05 RX ADMIN — TRASTUZUMAB 130 MG: 150 INJECTION, POWDER, LYOPHILIZED, FOR SOLUTION INTRAVENOUS at 10:23

## 2019-06-05 RX ADMIN — Medication 500 UNITS: at 12:10

## 2019-06-05 RX ADMIN — DIPHENHYDRAMINE HYDROCHLORIDE 25 MG: 50 INJECTION INTRAMUSCULAR; INTRAVENOUS at 10:06

## 2019-06-05 RX ADMIN — SODIUM CHLORIDE 25 ML/HR: 900 INJECTION, SOLUTION INTRAVENOUS at 09:56

## 2019-06-05 RX ADMIN — DEXAMETHASONE SODIUM PHOSPHATE 4 MG: 4 INJECTION, SOLUTION INTRA-ARTICULAR; INTRALESIONAL; INTRAMUSCULAR; INTRAVENOUS; SOFT TISSUE at 10:12

## 2019-06-05 NOTE — PROGRESS NOTES
[facility] Progress Note    Date: 2019    Name: Wanda Rosas    MRN: [id number]         : 1965    0910. Ms. Red Butts Arrived ambulatory and in no distress for C3D1 Taxol/Herceptin. Assessment was completed, no acute issues at this time, no new complaints voiced. R chest wall port accessed without difficulty using 0.75 inch fall needle, labs drawn and in process. Chemotherapy Flowsheet 2019   Cycle C3D1   Date 2019   Drug / Regimen Taxol/Herceptin   Pre Meds -   Notes -         Patient Vitals for the past 12 hrs:   Temp Pulse Resp BP   19 1206 -- 78 -- 130/63   19 0916 98.1 °F (36.7 °C) 94 18 128/78       Lab results were obtained and reviewed. Recent Results (from the past 12 hour(s))   CBC WITH 3 PART DIFF    Collection Time: 19  9:15 AM   Result Value Ref Range    WBC 4.6 3.6 - 11.0 K/uL    RBC 3.33 (L) 3.80 - 5.20 M/uL    HGB 11.0 (L) 11.5 - 16.0 g/dL    HCT 31.8 (L) 35.0 - 47.0 %    MCV 95.5 80.0 - 99.0 FL    MCH 33.0 26.0 - 34.0 PG    MCHC 34.6 30.0 - 36.5 g/dL    RDW 16.4 (H) 11.8 - 15.8 %    PLATELET 461 696 - 490 K/uL    NEUTROPHILS 48 32 - 75 %    MIXED CELLS 7 3.2 - 16.9 %    LYMPHOCYTES 45 12 - 49 %    ABS. NEUTROPHILS 2.2 1.8 - 8.0 K/UL    ABS. MIXED CELLS 0.3 0.2 - 1.2 K/uL    ABS. LYMPHOCYTES 2.1 0.8 - 3.5 K/UL    DF AUTOMATED     METABOLIC PANEL, COMPREHENSIVE    Collection Time: 19  9:26 AM   Result Value Ref Range    Sodium 138 136 - 145 mmol/L    Potassium 3.9 3.5 - 5.1 mmol/L    Chloride 105 97 - 108 mmol/L    CO2 27 21 - 32 mmol/L    Anion gap 6 5 - 15 mmol/L    Glucose 98 65 - 100 mg/dL    BUN 11 6 - 20 MG/DL    Creatinine 0.75 0.55 - 1.02 MG/DL    BUN/Creatinine ratio 15 12 - 20      GFR est AA >60 >60 ml/min/1.73m2    GFR est non-AA >60 >60 ml/min/1.73m2    Calcium 8.9 8.5 - 10.1 MG/DL    Bilirubin, total 0.5 0.2 - 1.0 MG/DL    ALT (SGPT) 28 12 - 78 U/L    AST (SGOT) 20 15 - 37 U/L    Alk.  phosphatase 39 (L) 45 - 117 U/L    Protein, total 6.0 (L) 6.4 - 8.2 g/dL    Albumin 3.4 (L) 3.5 - 5.0 g/dL    Globulin 2.6 2.0 - 4.0 g/dL    A-G Ratio 1.3 1.1 - 2.2         Pre-medications  were administered as ordered and chemotherapy was initiated. Medications:  NS KVO  Pepcid IV  Benadryl IV  Dexamethasone IV  Herceptin IV  Taxol IV    1210. Ms. Moctezuma tolerated treatment well and was discharged from [place] [address] in stable condition. Port de-accessed, flushed & heparinized per protocol. She is to return on 06/12/19 for her next appointment.     Wendy Wilkins RN  June 5, 2019

## 2019-06-12 ENCOUNTER — HOSPITAL ENCOUNTER (OUTPATIENT)
Dept: INFUSION THERAPY | Age: 54
Discharge: HOME | End: 2019-06-12
Payer: COMMERCIAL

## 2019-06-12 ENCOUNTER — OFFICE VISIT (OUTPATIENT)
Dept: ONCOLOGY | Age: 54
End: 2019-06-12

## 2019-06-12 VITALS
HEART RATE: 89 BPM | WEIGHT: 144.7 LBS | OXYGEN SATURATION: 98 % | DIASTOLIC BLOOD PRESSURE: 66 MMHG | RESPIRATION RATE: 18 BRPM | HEIGHT: 66 IN | BODY MASS INDEX: 23.25 KG/M2 | SYSTOLIC BLOOD PRESSURE: 109 MMHG | TEMPERATURE: 98.6 F

## 2019-06-12 VITALS
HEART RATE: 80 BPM | BODY MASS INDEX: 23.25 KG/M2 | SYSTOLIC BLOOD PRESSURE: 130 MMHG | TEMPERATURE: 98.6 F | RESPIRATION RATE: 18 BRPM | DIASTOLIC BLOOD PRESSURE: 64 MMHG | HEIGHT: 66 IN | WEIGHT: 144.7 LBS

## 2019-06-12 DIAGNOSIS — Z17.0 MALIGNANT NEOPLASM OF RIGHT BREAST IN FEMALE, ESTROGEN RECEPTOR POSITIVE, UNSPECIFIED SITE OF BREAST (HCC): Primary | ICD-10-CM

## 2019-06-12 DIAGNOSIS — Z17.0 MALIGNANT NEOPLASM OF LOWER-OUTER QUADRANT OF RIGHT BREAST OF FEMALE, ESTROGEN RECEPTOR POSITIVE (HCC): Primary | ICD-10-CM

## 2019-06-12 DIAGNOSIS — C50.911 MALIGNANT NEOPLASM OF RIGHT BREAST IN FEMALE, ESTROGEN RECEPTOR POSITIVE, UNSPECIFIED SITE OF BREAST (HCC): Primary | ICD-10-CM

## 2019-06-12 DIAGNOSIS — C50.511 MALIGNANT NEOPLASM OF LOWER-OUTER QUADRANT OF RIGHT BREAST OF FEMALE, ESTROGEN RECEPTOR POSITIVE (HCC): Primary | ICD-10-CM

## 2019-06-12 LAB
BASO+EOS+MONOS # BLD AUTO: 0.2 K/UL (ref 0.2–1.2)
BASO+EOS+MONOS NFR BLD AUTO: 4 % (ref 3.2–16.9)
DIFFERENTIAL METHOD BLD: ABNORMAL
ERYTHROCYTE [DISTWIDTH] IN BLOOD BY AUTOMATED COUNT: 16.7 % (ref 11.8–15.8)
HCT VFR BLD AUTO: 32.9 % (ref 35–47)
HGB BLD-MCNC: 11.5 G/DL (ref 11.5–16)
LYMPHOCYTES # BLD: 1.9 K/UL (ref 0.8–3.5)
LYMPHOCYTES NFR BLD: 42 % (ref 12–49)
MCH RBC QN AUTO: 33.6 PG (ref 26–34)
MCHC RBC AUTO-ENTMCNC: 35 G/DL (ref 30–36.5)
MCV RBC AUTO: 96.2 FL (ref 80–99)
NEUTS SEG # BLD: 2.5 K/UL (ref 1.8–8)
NEUTS SEG NFR BLD: 54 % (ref 32–75)
PLATELET # BLD AUTO: 384 K/UL (ref 150–400)
RBC # BLD AUTO: 3.42 M/UL (ref 3.8–5.2)
WBC # BLD AUTO: 4.6 K/UL (ref 3.6–11)

## 2019-06-12 PROCEDURE — 36415 COLL VENOUS BLD VENIPUNCTURE: CPT

## 2019-06-12 PROCEDURE — 74011250636 HC RX REV CODE- 250/636: Performed by: INTERNAL MEDICINE

## 2019-06-12 PROCEDURE — 85025 COMPLETE CBC W/AUTO DIFF WBC: CPT

## 2019-06-12 PROCEDURE — 96417 CHEMO IV INFUS EACH ADDL SEQ: CPT

## 2019-06-12 PROCEDURE — 74011250636 HC RX REV CODE- 250/636: Performed by: NURSE PRACTITIONER

## 2019-06-12 PROCEDURE — 96375 TX/PRO/DX INJ NEW DRUG ADDON: CPT

## 2019-06-12 PROCEDURE — 74011000250 HC RX REV CODE- 250: Performed by: INTERNAL MEDICINE

## 2019-06-12 PROCEDURE — 96413 CHEMO IV INFUSION 1 HR: CPT

## 2019-06-12 PROCEDURE — 77030012965 HC NDL HUBR BBMI -A

## 2019-06-12 PROCEDURE — 74011250636 HC RX REV CODE- 250/636

## 2019-06-12 RX ORDER — DIPHENHYDRAMINE HYDROCHLORIDE 50 MG/ML
25 INJECTION, SOLUTION INTRAMUSCULAR; INTRAVENOUS AS NEEDED
Status: CANCELLED
Start: 2019-06-12

## 2019-06-12 RX ORDER — DIPHENHYDRAMINE HYDROCHLORIDE 50 MG/ML
25 INJECTION, SOLUTION INTRAMUSCULAR; INTRAVENOUS ONCE
Status: COMPLETED | OUTPATIENT
Start: 2019-06-12 | End: 2019-06-12

## 2019-06-12 RX ORDER — SODIUM CHLORIDE 9 MG/ML
10 INJECTION INTRAMUSCULAR; INTRAVENOUS; SUBCUTANEOUS AS NEEDED
Status: ACTIVE | OUTPATIENT
Start: 2019-06-12 | End: 2019-06-12

## 2019-06-12 RX ORDER — DEXAMETHASONE SODIUM PHOSPHATE 4 MG/ML
4 INJECTION, SOLUTION INTRA-ARTICULAR; INTRALESIONAL; INTRAMUSCULAR; INTRAVENOUS; SOFT TISSUE ONCE
Status: COMPLETED | OUTPATIENT
Start: 2019-06-12 | End: 2019-06-12

## 2019-06-12 RX ORDER — SODIUM CHLORIDE 0.9 % (FLUSH) 0.9 %
10 SYRINGE (ML) INJECTION AS NEEDED
Status: ACTIVE | OUTPATIENT
Start: 2019-06-12 | End: 2019-06-12

## 2019-06-12 RX ORDER — SODIUM CHLORIDE 9 MG/ML
25 INJECTION, SOLUTION INTRAVENOUS CONTINUOUS
Status: DISPENSED | OUTPATIENT
Start: 2019-06-12 | End: 2019-06-12

## 2019-06-12 RX ORDER — HEPARIN 100 UNIT/ML
300-500 SYRINGE INTRAVENOUS AS NEEDED
Status: ACTIVE | OUTPATIENT
Start: 2019-06-12 | End: 2019-06-12

## 2019-06-12 RX ADMIN — SODIUM CHLORIDE 25 ML/HR: 900 INJECTION, SOLUTION INTRAVENOUS at 11:09

## 2019-06-12 RX ADMIN — FAMOTIDINE 20 MG: 10 INJECTION, SOLUTION INTRAVENOUS at 11:10

## 2019-06-12 RX ADMIN — Medication 500 UNITS: at 13:48

## 2019-06-12 RX ADMIN — Medication 10 ML: at 13:48

## 2019-06-12 RX ADMIN — DIPHENHYDRAMINE HYDROCHLORIDE 25 MG: 50 INJECTION INTRAMUSCULAR; INTRAVENOUS at 11:22

## 2019-06-12 RX ADMIN — DEXAMETHASONE SODIUM PHOSPHATE 4 MG: 4 INJECTION, SOLUTION INTRA-ARTICULAR; INTRALESIONAL; INTRAMUSCULAR; INTRAVENOUS; SOFT TISSUE at 11:16

## 2019-06-12 RX ADMIN — PACLITAXEL 139 MG: 6 INJECTION INTRAVENOUS at 12:30

## 2019-06-12 RX ADMIN — TRASTUZUMAB 130 MG: 150 INJECTION, POWDER, LYOPHILIZED, FOR SOLUTION INTRAVENOUS at 11:47

## 2019-06-12 NOTE — PROGRESS NOTES
[facility] Progress Note    Date: 2019    Name: Amie Randle    MRN: [id number]         : 1965    Ms. Arcelia Pandey Arrived ambulatory and in no distress for cycle 3 day 8 of Taxol/Herceptin regimen. Assessment was completed, no acute issues at this time, no new complaints voiced. R chest port accessed without difficulty, labs drawn and in process. Patient stated her hair is starting to fall out spoke with pt regarding options for hair loss. She also states food has no taste but is still eating and no major weight loss has occurred. Chemotherapy Flowsheet 2019   Cycle C3D8   Date 2019   Drug / Regimen Taxol/Herceptin   Pre Meds -   Notes -         0920  Proceeded to appt with Dr. Osmel Joseph    Ms. Moctezuma's vitals were reviewed. Patient Vitals for the past 12 hrs:   Temp Pulse Resp BP   19 1349 -- 80 18 130/64   19 0915 98.6 °F (37 °C) 89 16 109/66       Lab results were obtained and reviewed. Recent Results (from the past 12 hour(s))   CBC WITH 3 PART DIFF    Collection Time: 19  9:16 AM   Result Value Ref Range    WBC 4.6 3.6 - 11.0 K/uL    RBC 3.42 (L) 3.80 - 5.20 M/uL    HGB 11.5 11.5 - 16.0 g/dL    HCT 32.9 (L) 35.0 - 47.0 %    MCV 96.2 80.0 - 99.0 FL    MCH 33.6 26.0 - 34.0 PG    MCHC 35.0 30.0 - 36.5 g/dL    RDW 16.7 (H) 11.8 - 15.8 %    PLATELET 790 259 - 388 K/uL    NEUTROPHILS 54 32 - 75 %    MIXED CELLS 4 3.2 - 16.9 %    LYMPHOCYTES 42 12 - 49 %    ABS. NEUTROPHILS 2.5 1.8 - 8.0 K/UL    ABS. MIXED CELLS 0.2 0.2 - 1.2 K/uL    ABS. LYMPHOCYTES 1.9 0.8 - 3.5 K/UL    DF AUTOMATED       Pre-medications  were administered as ordered and chemotherapy was initiated. Pepcid IV  Benadryl IV  Decadron IV  Herceptin IV  Taxol IV      Ms. Moctezuma tolerated treatment well and was discharged from [place] [address] in stable condition. She is to return on 19 for her next appointment.     Yanira Garber  2019

## 2019-06-12 NOTE — PROGRESS NOTES
[facility] [place] [address] [address], [address] [facility]  Maame [phone]  F: [phone]      Reason for Visit:   eJannie Calhoun is a 52 y.o. female who is seen in consultation at the request of Dr. Kari Woodard for evaluation of systemic therapy for breast cancer. Consulting physician:  Dr. Alejandra Agustin    Treatment History:   · 12/10/18 right breast US bx:  IMC, gr 1, 0.12 cm (1.2 mm); insufficient for receptors  · 19 right breast core bx:  11:00 lobular intraepithelial neoplasia; right breast core bx 7:00:  DCIS, gr 2-3, cribriform, 1.4 cm, ER + at 94%, PA + at 54%  · 19 right mastectomy : LOQ IDC, 1.4 cm, ER + at 89%, PA + at 78%, HER 2 POSITIVE (IHC 2+, FISH ratio 3.3; sig/cell 9.2) ; ki67 22%, gr 2, DCIS present and extensive, 0/2 LN; pT1c pN0 cM0  · Myriad Myrisk negative  · TH 19-    History of Present Illness: An abnormal mammogram 2018 led to the pathology above. Interval history:  In today for follow up. Complains of gr 1 bleeding, gr 1 diarrhea, gr 1 fatigue, gr 1 hot flashes, gr 1 insomnia, gr 1 loss of cognition and concentration, gr 1 headache    FH:   Mother with breast cancer at age 45s and 46s; maternal aunt with breast cancer in her 46s; maternal aunt with breast cancer in her 46s; no ovarian, prostate, pancreas cancer    LMP 2018, spotty prior to that    Past Medical History:   Diagnosis Date    Adverse effect of anesthesia     difficulty awakening      Past Surgical History:   Procedure Laterality Date    BREAST SURGERY PROCEDURE UNLISTED Left     lumpectomy no lymph    COLONOSCOPY Left 2018    COLONOSCOPY performed by Myra Chisholm MD at [facility] ENDOSCOPY    HX ORTHOPAEDIC      foot surgery    IR INSERT TUNL CVC W PORT OVER 5 YEARS  2019      Social History     Tobacco Use    Smoking status: Former Smoker     Packs/day: 1.00     Years: 20.00     Pack years: 20.00     Types: Cigarettes     Last attempt to quit: 04/2004     Years since quitting: 15.2    Smokeless tobacco: Never Used   Substance Use Topics    Alcohol use: Yes     Alcohol/week: 8.4 oz     Types: 14 Glasses of wine per week      Family History   Problem Relation Age of Onset    Breast Cancer Mother         42's 1st time late 52's 2nd time   [facility] Cancer Mother         breast CA x2    Hypertension Mother     Heart Disease Father     Cancer Maternal Aunt         breast    Cancer Maternal Aunt         breast    Cancer Maternal Cousin         Breast     Current Outpatient Medications   Medication Sig    OTHER Massage    Dx. C50.511 breast cancer    TURMERIC PO Take 2 Caps by mouth daily.  ALPRAZolam (XANAX) 0.25 mg tablet Take 0.25 mg by mouth.  spironolactone (ALDACTONE) 25 mg tablet Take  by mouth daily.  vit B Cmplx 3-FA-Vit C-Biotin (NEPHRO VINOD RX) 1- mg-mg-mcg tablet Take 1 Tab by mouth daily.  omega 3-dha-epa-fish oil (FISH OIL) 100-160-1,000 mg cap Take 1 Cap by mouth daily.  prochlorperazine (COMPAZINE) 10 mg tablet Take 1 Tab by mouth every six (6) hours as needed for Nausea.  lidocaine-prilocaine (EMLA) topical cream Apply  to affected area as needed for Pain.  ondansetron hcl (ZOFRAN) 8 mg tablet Take 1 Tab by mouth every twelve (12) hours as needed for Nausea. No current facility-administered medications for this visit. Allergies   Allergen Reactions    Ancef [Cefazolin] Hives    Penicillins Hives        Review of Systems: A complete review of systems was obtained, negative except as described above.     Physical Exam:     Visit Vitals  /66   Pulse 89   Temp 98.6 °F (37 °C) (Temporal)   Resp 18   Ht 5' 6\" (1.676 m)   Wt 144 lb 11.2 oz (65.6 kg)   SpO2 98%   BMI 23.36 kg/m²     ECOG PS: 0  General: No distress  Eyes: PERRLA, anicteric sclerae  HENT: Atraumatic, OP clear  Neck: Supple  Lymphatic: No cervical, supraclavicular, or inguinal adenopathy  Respiratory: CTAB, normal respiratory effort  CV: Normal rate, regular rhythm, no murmurs, no peripheral edema  GI: Soft, nontender, nondistended, no masses, no hepatomegaly, no splenomegaly  MS: Normal gait and station. Digits without clubbing or cyanosis. Skin: No rashes, ecchymoses, or petechiae. Normal temperature, turgor, and texture. Psych: Alert, oriented, appropriate affect, normal judgment/insight        Results:     Lab Results   Component Value Date/Time    WBC 4.6 06/12/2019 09:16 AM    HGB 11.5 06/12/2019 09:16 AM    HCT 32.9 (L) 06/12/2019 09:16 AM    PLATELET 833 13/46/7487 09:16 AM    MCV 96.2 06/12/2019 09:16 AM    ABS. NEUTROPHILS 2.5 06/12/2019 09:16 AM     Lab Results   Component Value Date/Time    Sodium 138 06/05/2019 09:26 AM    Potassium 3.9 06/05/2019 09:26 AM    Chloride 105 06/05/2019 09:26 AM    CO2 27 06/05/2019 09:26 AM    Glucose 98 06/05/2019 09:26 AM    BUN 11 06/05/2019 09:26 AM    Creatinine 0.75 06/05/2019 09:26 AM    GFR est AA >60 06/05/2019 09:26 AM    GFR est non-AA >60 06/05/2019 09:26 AM    Calcium 8.9 06/05/2019 09:26 AM     Lab Results   Component Value Date/Time    Bilirubin, total 0.5 06/05/2019 09:26 AM    ALT (SGPT) 28 06/05/2019 09:26 AM    AST (SGOT) 20 06/05/2019 09:26 AM    Alk. phosphatase 39 (L) 06/05/2019 09:26 AM    Protein, total 6.0 (L) 06/05/2019 09:26 AM    Albumin 3.4 (L) 06/05/2019 09:26 AM    Globulin 2.6 06/05/2019 09:26 AM         Records reviewed and summarized above. Pathology report(s) reviewed above. Radiology report(s) reviewed above. Assessment/plan:   1. Right LOQ IDC, 1.4 cm, gr 2, 0/2 LN, ER +, NC +, HER 2 POSITIVE:  Stage IA (both anatomic and prognostic). Likely postmenopausal    We explained to the patient that the goal of systemic adjuvant therapy is to improve the chances for cure and decrease the risk of relapse. We explained why a patient can have microscopic cancer spread now even though physical examination, laboratory studies and imaging studies are negative for cancer.  We explained that the same treatments used now as adjuvant or preventive treatments rarely if ever are curative in women who develop metastases. Archana Roman suggests equivalency between q.3 week Adriamycin, Cytoxan followed by weekly paclitaxel and trastuzumab compared with the [facility] regimen. However, this study was not powered to show a difference between these two regimens, and in the [facility] publication, the AC-TH arm showed a numerically advantange (though not statistically significant) to the [facility] arm. In this patient, it is completely reasonable to use [facility] approach and avoid the potential cardiotoxicity of the anthracyclines as well as the potential for leukemia. We discussed the toxicities of docetaxel and carboplatin chemotherapy in detail. This chemotherapy frequently causes a low white blood cell count and hospital admissions for treatment of neutropenic fever. We explained that we consider the use of growth factors to minimize this risk. We explained to the patient that some side effects if they occur only last a few days including nausea, vomiting, stomatitis, arthralgia, myalgia,and allergic reactions to Taxotere. We told the patient that severe nausea and vomiting were uncommon and that some side effects,if they occur, will last longer; this includes hair loss, which will be seen in all patients treated with these agents and fatigue,which will be seen in most.  We also informed that for the patient that heart damage is rare with these agents. We explained that carboplatin can rarely cause kidney damage and high frequency hearing loss. We provided the patient in detail her information concerning the toxicities of this regimen in addition to her overall discussion.       Rationale for therapy with trastuzumab was also discussed with the patient including a 50% proportional improvement in disease free survival and also an improvement in overall survival in patients receiving trastuzumab and chemotherapy for HER-2 positive breast cancer. The side effects of trastuzumab were discussed including a 4%-5% risk of dropping her ejection fraction while on treatment and about a 1% risk of CHF. We discussed that this drug will be used every 3 weeks for remainder of a year following the chemotherapy cycles. We will check her EF before chemotherapy and every 3 months while she is receiving trastuzumab. · TCH (Trastuzumab 8mg/kg load with cycle 1 then 6mg/kg, Docetaxel 75mg/m2, Carboplatin AUC 6) given every 3 weeks x 6 cycles  · Labs: CBC, CMP prior to each treatment  · Antiemetic Prophylaxis: Palonosetron and dexamethasone prior to chemo  · PRN Antiemetics: Ondansetron, Compazine  · Swelling prophylaxis: Dexmethasone 8mg bid the day before, and day after chemotherapy  · TTE prior to chemotherapy and every 12 weeks while on Trastuzumab  · Neulasta 24-72 hours after each treatment    Also discussed the APT study results, weekly paclitaxel 80 mg/m2 x 12 with weekly trastuzumab (4 mg/kg load then 2 mg/kg)  followed by outback trastuzumab to complete one year. 42% were pT1c. I discussed the potential risks of paclitaxel chemotherapy with the patient. Major toxicities include nausea and vomiting, stomatitis, fatigue. We provided the patient with detailed information concerning toxicity including frequent toxicities that only last a few days, such as nausea, vomiting, mouth sores, arthralgia, myalgia, and potentially allergic reactions to paclitaxel, as well as toxicities which can be longer lasting including total alopecia, fatigue, anemia and neuropathy. We provided the patient with detailed information concerning the toxicities of their regimen in addition to our verbal discussion. This is a reasonable regimen in this setting (only 4 distant recurrences over 7 years in this study, 7 year DFS was 95%)     After this discussion, she is agreeable to paclitaxel and trastuzumab as in APT, will start on 4/22.   She has signed informed consent. TTE on 4/11/19, EF 64%. Port was placed. TH C2D1 today. 8/12 today    The patient was given the following prescriptions with written and verbal instructions on how to use each:  Compazine, zofran,  a wig, and emla cream.      Discussed cold caps and cryotherapy with paclitaxel. She will not require XRT. Discussed endocrine therapy. The risks and benefits of tamoxifen were discussed in detail and the patient was informed of the following: Risks include a 1% risk of endometrial cancer for postmenopausal women treated for five years but no (or a minimally increased) risk in premenopausal women and that most women who develop tamoxifen-associated endometrial cancer can be cured. Any bleeding in a postmenopausal woman should be reported to a health care professional. There is also a 1% risk of blood clots (thromboembolism) that can be fatal. All patients irrespective of age who take tamoxifen and who have not had a hysterectomy should have a pelvic exam and Pap smear yearly. Tamoxifen increases the risk of cataract formation and on rare occasions has caused retinal damage: an eye exam is recommended yearly. Other risks include vaginal discharge or dryness, the development or worsening of hot flashes or vasomotor symptoms, and bone loss in premenopausal women. There is excellent evidence that tamoxifen does not increase risk of depression, cause weight gain or have a major effect on sexual function. Available data suggests little or no effect on cognitive function. Benefits include a lowering of cholesterol and a reduction in the rate of bone loss for postmenopausal woman. Any other symptoms should be reported. The risks and benefits of aromatase inhibitors (anastrozole, letrozole, and exemestane) were discussed in detail and the patient was informed of the following: Risks include the development of painful muscles and joints (arthralgia/myalgia) and bone loss.  Muscle and joint pain can be severe but rarely result in any tissue damage; symptoms usually resolve in several weeks when the medication is stopped. Bone loss is common and a bone density test is recommended as a baseline and then yearly to every several years depending on initial results. The risk of fractures is increased by a few percent in patients taking these drugs, but careful monitoring of bone density and using bone protecting agents when indicated can minimize these risks. Unlike tamoxifen there is no increased risk of blood clots or endometrial cancer. AIs can cause or worsen vaginal dryness but women using these drugs should not use vaginal estrogen preparations for these symptoms. AIs can also cause or increase hot flashes. Any other symptoms should be reported. Will make a final decision on endocrine therapy following chemotherapy. May need to check LH, FSH, estradiol, though likely postmenopausal    Follow-up after early breast cancer was discussed. I recommend follow-up as defined by the American Society of Clinical Oncology and [address] [place] [address]. This includes a visit to a health care professional every 3-6 months for 3 years, then every 6-12 months for 2 years, and then yearly as well as mammograms yearly. 2. Emotional well being:  She has excellent support and is coping well with her disease. 3. Diarrhea: Due to chemo. Intermittent. PRN Imodium. Will monitor. 4. Right arm pain:  Ongoing prior to treatment, intermittent. May be due to surgery. Previously referred to [facility], PT. She has met with Lyndle Cogan, PT and was given exercises which have helped. Will monitor. 5. Nosebleeds:  Due to chemo. Minor. Recommend nasal saline PRN. Will monitor. 6. Headaches:  Mild; likely due to therapy/antiemetics          I appreciate the opportunity to participate in Ms. Santosh Moctezuma's care.     Signed By: Geri Maxwell MD      No orders of the defined types were placed in this encounter.

## 2019-06-14 RX ORDER — DIPHENHYDRAMINE HYDROCHLORIDE 50 MG/ML
25 INJECTION, SOLUTION INTRAMUSCULAR; INTRAVENOUS AS NEEDED
Status: CANCELLED
Start: 2019-06-26

## 2019-06-14 RX ORDER — DIPHENHYDRAMINE HYDROCHLORIDE 50 MG/ML
25 INJECTION, SOLUTION INTRAMUSCULAR; INTRAVENOUS ONCE
Status: CANCELLED
Start: 2019-06-26

## 2019-06-14 RX ORDER — DIPHENHYDRAMINE HYDROCHLORIDE 50 MG/ML
25 INJECTION, SOLUTION INTRAMUSCULAR; INTRAVENOUS ONCE
Status: CANCELLED
Start: 2019-07-10

## 2019-06-14 RX ORDER — DEXAMETHASONE SODIUM PHOSPHATE 4 MG/ML
4 INJECTION, SOLUTION INTRA-ARTICULAR; INTRALESIONAL; INTRAMUSCULAR; INTRAVENOUS; SOFT TISSUE ONCE
Status: CANCELLED | OUTPATIENT
Start: 2019-06-19

## 2019-06-14 RX ORDER — DEXAMETHASONE SODIUM PHOSPHATE 4 MG/ML
4 INJECTION, SOLUTION INTRA-ARTICULAR; INTRALESIONAL; INTRAMUSCULAR; INTRAVENOUS; SOFT TISSUE ONCE
Status: CANCELLED | OUTPATIENT
Start: 2019-07-10

## 2019-06-14 RX ORDER — DEXAMETHASONE SODIUM PHOSPHATE 4 MG/ML
4 INJECTION, SOLUTION INTRA-ARTICULAR; INTRALESIONAL; INTRAMUSCULAR; INTRAVENOUS; SOFT TISSUE ONCE
Status: CANCELLED | OUTPATIENT
Start: 2019-06-26

## 2019-06-14 RX ORDER — DIPHENHYDRAMINE HYDROCHLORIDE 50 MG/ML
25 INJECTION, SOLUTION INTRAMUSCULAR; INTRAVENOUS AS NEEDED
Status: CANCELLED
Start: 2019-06-19

## 2019-06-14 RX ORDER — DIPHENHYDRAMINE HYDROCHLORIDE 50 MG/ML
25 INJECTION, SOLUTION INTRAMUSCULAR; INTRAVENOUS ONCE
Status: CANCELLED
Start: 2019-07-03

## 2019-06-14 RX ORDER — DIPHENHYDRAMINE HYDROCHLORIDE 50 MG/ML
25 INJECTION, SOLUTION INTRAMUSCULAR; INTRAVENOUS ONCE
Status: CANCELLED
Start: 2019-06-19

## 2019-06-14 RX ORDER — DEXAMETHASONE SODIUM PHOSPHATE 4 MG/ML
4 INJECTION, SOLUTION INTRA-ARTICULAR; INTRALESIONAL; INTRAMUSCULAR; INTRAVENOUS; SOFT TISSUE ONCE
Status: CANCELLED | OUTPATIENT
Start: 2019-07-03

## 2019-06-14 RX ORDER — DIPHENHYDRAMINE HYDROCHLORIDE 50 MG/ML
25 INJECTION, SOLUTION INTRAMUSCULAR; INTRAVENOUS AS NEEDED
Status: CANCELLED
Start: 2019-07-03

## 2019-06-14 RX ORDER — DIPHENHYDRAMINE HYDROCHLORIDE 50 MG/ML
25 INJECTION, SOLUTION INTRAMUSCULAR; INTRAVENOUS AS NEEDED
Status: CANCELLED
Start: 2019-07-10

## 2019-06-19 ENCOUNTER — HOSPITAL ENCOUNTER (OUTPATIENT)
Dept: INFUSION THERAPY | Age: 54
Discharge: HOME | End: 2019-06-19
Payer: COMMERCIAL

## 2019-06-19 VITALS
HEART RATE: 78 BPM | SYSTOLIC BLOOD PRESSURE: 114 MMHG | BODY MASS INDEX: 23.63 KG/M2 | RESPIRATION RATE: 18 BRPM | WEIGHT: 147 LBS | TEMPERATURE: 97.2 F | HEIGHT: 66 IN | DIASTOLIC BLOOD PRESSURE: 75 MMHG

## 2019-06-19 DIAGNOSIS — Z17.0 MALIGNANT NEOPLASM OF RIGHT BREAST IN FEMALE, ESTROGEN RECEPTOR POSITIVE, UNSPECIFIED SITE OF BREAST (HCC): Primary | ICD-10-CM

## 2019-06-19 DIAGNOSIS — C50.911 MALIGNANT NEOPLASM OF RIGHT BREAST IN FEMALE, ESTROGEN RECEPTOR POSITIVE, UNSPECIFIED SITE OF BREAST (HCC): Primary | ICD-10-CM

## 2019-06-19 LAB
BASO+EOS+MONOS # BLD AUTO: 0.3 K/UL (ref 0.2–1.2)
BASO+EOS+MONOS NFR BLD AUTO: 8 % (ref 3.2–16.9)
DIFFERENTIAL METHOD BLD: ABNORMAL
ERYTHROCYTE [DISTWIDTH] IN BLOOD BY AUTOMATED COUNT: 17.4 % (ref 11.8–15.8)
HCT VFR BLD AUTO: 31.1 % (ref 35–47)
HGB BLD-MCNC: 10.9 G/DL (ref 11.5–16)
LYMPHOCYTES # BLD: 2.1 K/UL (ref 0.8–3.5)
LYMPHOCYTES NFR BLD: 49 % (ref 12–49)
MCH RBC QN AUTO: 34.1 PG (ref 26–34)
MCHC RBC AUTO-ENTMCNC: 35 G/DL (ref 30–36.5)
MCV RBC AUTO: 97.2 FL (ref 80–99)
NEUTS SEG # BLD: 1.8 K/UL (ref 1.8–8)
NEUTS SEG NFR BLD: 43 % (ref 32–75)
PLATELET # BLD AUTO: 364 K/UL (ref 150–400)
RBC # BLD AUTO: 3.2 M/UL (ref 3.8–5.2)
WBC # BLD AUTO: 4.2 K/UL (ref 3.6–11)

## 2019-06-19 PROCEDURE — 74011250636 HC RX REV CODE- 250/636

## 2019-06-19 PROCEDURE — 36415 COLL VENOUS BLD VENIPUNCTURE: CPT

## 2019-06-19 PROCEDURE — 77030012965 HC NDL HUBR BBMI -A

## 2019-06-19 PROCEDURE — 74011250636 HC RX REV CODE- 250/636: Performed by: INTERNAL MEDICINE

## 2019-06-19 PROCEDURE — 96375 TX/PRO/DX INJ NEW DRUG ADDON: CPT

## 2019-06-19 PROCEDURE — 96413 CHEMO IV INFUSION 1 HR: CPT

## 2019-06-19 PROCEDURE — 96417 CHEMO IV INFUS EACH ADDL SEQ: CPT

## 2019-06-19 PROCEDURE — 85025 COMPLETE CBC W/AUTO DIFF WBC: CPT

## 2019-06-19 RX ORDER — SODIUM CHLORIDE 9 MG/ML
25 INJECTION, SOLUTION INTRAVENOUS CONTINUOUS
Status: DISPENSED | OUTPATIENT
Start: 2019-06-19 | End: 2019-06-19

## 2019-06-19 RX ORDER — SODIUM CHLORIDE 0.9 % (FLUSH) 0.9 %
10 SYRINGE (ML) INJECTION AS NEEDED
Status: ACTIVE | OUTPATIENT
Start: 2019-06-19 | End: 2019-06-19

## 2019-06-19 RX ORDER — DEXAMETHASONE SODIUM PHOSPHATE 4 MG/ML
4 INJECTION, SOLUTION INTRA-ARTICULAR; INTRALESIONAL; INTRAMUSCULAR; INTRAVENOUS; SOFT TISSUE ONCE
Status: COMPLETED | OUTPATIENT
Start: 2019-06-19 | End: 2019-06-19

## 2019-06-19 RX ORDER — SODIUM CHLORIDE 9 MG/ML
10 INJECTION INTRAMUSCULAR; INTRAVENOUS; SUBCUTANEOUS AS NEEDED
Status: ACTIVE | OUTPATIENT
Start: 2019-06-19 | End: 2019-06-19

## 2019-06-19 RX ORDER — HEPARIN 100 UNIT/ML
300-500 SYRINGE INTRAVENOUS AS NEEDED
Status: ACTIVE | OUTPATIENT
Start: 2019-06-19 | End: 2019-06-19

## 2019-06-19 RX ORDER — DIPHENHYDRAMINE HYDROCHLORIDE 50 MG/ML
25 INJECTION, SOLUTION INTRAMUSCULAR; INTRAVENOUS ONCE
Status: COMPLETED | OUTPATIENT
Start: 2019-06-19 | End: 2019-06-19

## 2019-06-19 RX ADMIN — Medication 500 UNITS: at 11:45

## 2019-06-19 RX ADMIN — DIPHENHYDRAMINE HYDROCHLORIDE 25 MG: 50 INJECTION INTRAMUSCULAR; INTRAVENOUS at 09:44

## 2019-06-19 RX ADMIN — SODIUM CHLORIDE 25 ML/HR: 900 INJECTION, SOLUTION INTRAVENOUS at 09:36

## 2019-06-19 RX ADMIN — TRASTUZUMAB 130 MG: 150 INJECTION, POWDER, LYOPHILIZED, FOR SOLUTION INTRAVENOUS at 10:05

## 2019-06-19 RX ADMIN — FAMOTIDINE 20 MG: 10 INJECTION, SOLUTION INTRAVENOUS at 09:38

## 2019-06-19 RX ADMIN — DEXAMETHASONE SODIUM PHOSPHATE 4 MG: 4 INJECTION, SOLUTION INTRA-ARTICULAR; INTRALESIONAL; INTRAMUSCULAR; INTRAVENOUS; SOFT TISSUE at 09:49

## 2019-06-19 RX ADMIN — Medication 10 ML: at 11:45

## 2019-06-19 RX ADMIN — PACLITAXEL 139 MG: 6 INJECTION, SOLUTION INTRAVENOUS at 10:36

## 2019-06-19 NOTE — PROGRESS NOTES
hospitals Progress Note    Date: 2019    Name: Ginger Degroot    MRN: [id number]         : 1965    0910. Ms. Orlando Quevedo Arrived ambulatory and in no distress for C3D1 Taxol/Herceptin. Assessment was completed, no acute issues at this time, no new complaints voiced. R chest wall port accessed without difficulty using 0.75 inch fall needle, labs drawn and in process. Chemotherapy Flowsheet 2019   Cycle C3D15   Date 2019   Drug / Regimen Taxol/Herceptin   Pre Meds -   Notes -         Patient Vitals for the past 12 hrs:   Temp Pulse Resp BP   19 0916 97.2 °F (36.2 °C) 88 18 126/75       Lab results were obtained and reviewed. Recent Results (from the past 12 hour(s))   CBC WITH 3 PART DIFF    Collection Time: 19  9:15 AM   Result Value Ref Range    WBC 4.2 3.6 - 11.0 K/uL    RBC 3.20 (L) 3.80 - 5.20 M/uL    HGB 10.9 (L) 11.5 - 16.0 g/dL    HCT 31.1 (L) 35.0 - 47.0 %    MCV 97.2 80.0 - 99.0 FL    MCH 34.1 (H) 26.0 - 34.0 PG    MCHC 35.0 30.0 - 36.5 g/dL    RDW 17.4 (H) 11.8 - 15.8 %    PLATELET 490 253 - 511 K/uL    NEUTROPHILS 43 32 - 75 %    MIXED CELLS 8 3.2 - 16.9 %    LYMPHOCYTES 49 12 - 49 %    ABS. NEUTROPHILS 1.8 1.8 - 8.0 K/UL    ABS. MIXED CELLS 0.3 0.2 - 1.2 K/uL    ABS. LYMPHOCYTES 2.1 0.8 - 3.5 K/UL    DF AUTOMATED         Pre-medications  were administered as ordered and chemotherapy was initiated. Medications:  NS KVO  Pepcid IV  Benadryl IV  Dexamethasone IV  Herceptin IV  Taxol IV    1145. Ms. Orlando Quevedo tolerated treatment well and was discharged from [place] [address] in stable condition. Port de-accessed, flushed & heparinized per protocol. She is to return on 19 for her next appointment.     Alicia Reynaga RN  2019

## 2019-06-26 ENCOUNTER — OFFICE VISIT (OUTPATIENT)
Dept: ONCOLOGY | Age: 54
End: 2019-06-26

## 2019-06-26 ENCOUNTER — HOSPITAL ENCOUNTER (OUTPATIENT)
Dept: INFUSION THERAPY | Age: 54
Discharge: HOME | End: 2019-06-26
Payer: COMMERCIAL

## 2019-06-26 VITALS
SYSTOLIC BLOOD PRESSURE: 132 MMHG | TEMPERATURE: 97.8 F | HEIGHT: 66 IN | RESPIRATION RATE: 18 BRPM | DIASTOLIC BLOOD PRESSURE: 69 MMHG | HEART RATE: 100 BPM | BODY MASS INDEX: 23.38 KG/M2 | OXYGEN SATURATION: 100 % | WEIGHT: 145.5 LBS

## 2019-06-26 VITALS
HEART RATE: 85 BPM | OXYGEN SATURATION: 100 % | WEIGHT: 145.5 LBS | DIASTOLIC BLOOD PRESSURE: 78 MMHG | SYSTOLIC BLOOD PRESSURE: 137 MMHG | TEMPERATURE: 97.8 F | HEIGHT: 66 IN | BODY MASS INDEX: 23.38 KG/M2

## 2019-06-26 DIAGNOSIS — Z17.0 MALIGNANT NEOPLASM OF RIGHT BREAST IN FEMALE, ESTROGEN RECEPTOR POSITIVE, UNSPECIFIED SITE OF BREAST (HCC): Primary | ICD-10-CM

## 2019-06-26 DIAGNOSIS — Z79.899 ENCOUNTER FOR MONITORING CARDIOTOXIC DRUG THERAPY: ICD-10-CM

## 2019-06-26 DIAGNOSIS — C50.511 MALIGNANT NEOPLASM OF LOWER-OUTER QUADRANT OF RIGHT BREAST OF FEMALE, ESTROGEN RECEPTOR POSITIVE (HCC): Primary | ICD-10-CM

## 2019-06-26 DIAGNOSIS — M79.601 PAIN OF RIGHT UPPER EXTREMITY: ICD-10-CM

## 2019-06-26 DIAGNOSIS — Z51.11 ENCOUNTER FOR ANTINEOPLASTIC CHEMOTHERAPY: ICD-10-CM

## 2019-06-26 DIAGNOSIS — R19.7 DIARRHEA, UNSPECIFIED TYPE: ICD-10-CM

## 2019-06-26 DIAGNOSIS — Z17.0 MALIGNANT NEOPLASM OF LOWER-OUTER QUADRANT OF RIGHT BREAST OF FEMALE, ESTROGEN RECEPTOR POSITIVE (HCC): Primary | ICD-10-CM

## 2019-06-26 DIAGNOSIS — Z51.81 ENCOUNTER FOR MONITORING CARDIOTOXIC DRUG THERAPY: ICD-10-CM

## 2019-06-26 DIAGNOSIS — C50.911 MALIGNANT NEOPLASM OF RIGHT BREAST IN FEMALE, ESTROGEN RECEPTOR POSITIVE, UNSPECIFIED SITE OF BREAST (HCC): Primary | ICD-10-CM

## 2019-06-26 DIAGNOSIS — R04.0 NOSEBLEED: ICD-10-CM

## 2019-06-26 LAB
ALBUMIN SERPL-MCNC: 3.5 G/DL (ref 3.5–5)
ALBUMIN/GLOB SERPL: 1.3 {RATIO} (ref 1.1–2.2)
ALP SERPL-CCNC: 38 U/L (ref 45–117)
ALT SERPL-CCNC: 25 U/L (ref 12–78)
ANION GAP SERPL CALC-SCNC: 5 MMOL/L (ref 5–15)
AST SERPL-CCNC: 22 U/L (ref 15–37)
BASOPHILS # BLD: 0.1 K/UL (ref 0–0.1)
BASOPHILS NFR BLD: 1 % (ref 0–1)
BILIRUB SERPL-MCNC: 0.7 MG/DL (ref 0.2–1)
BUN SERPL-MCNC: 10 MG/DL (ref 6–20)
BUN/CREAT SERPL: 14 (ref 12–20)
CALCIUM SERPL-MCNC: 9.2 MG/DL (ref 8.5–10.1)
CHLORIDE SERPL-SCNC: 105 MMOL/L (ref 97–108)
CO2 SERPL-SCNC: 28 MMOL/L (ref 21–32)
CREAT SERPL-MCNC: 0.73 MG/DL (ref 0.55–1.02)
DIFFERENTIAL METHOD BLD: ABNORMAL
EOSINOPHIL # BLD: 0 K/UL (ref 0–0.4)
EOSINOPHIL NFR BLD: 1 % (ref 0–7)
ERYTHROCYTE [DISTWIDTH] IN BLOOD BY AUTOMATED COUNT: 17.2 % (ref 11.5–14.5)
FSH SERPL-ACNC: 66.7 MIU/ML
GLOBULIN SER CALC-MCNC: 2.7 G/DL (ref 2–4)
GLUCOSE SERPL-MCNC: 91 MG/DL (ref 65–100)
HCT VFR BLD AUTO: 31.1 % (ref 35–47)
HGB BLD-MCNC: 10.5 G/DL (ref 11.5–16)
IMM GRANULOCYTES # BLD AUTO: 0 K/UL (ref 0–0.04)
IMM GRANULOCYTES NFR BLD AUTO: 0 % (ref 0–0.5)
LH SERPL-ACNC: 32.1 MIU/ML
LYMPHOCYTES # BLD: 2.4 K/UL (ref 0.8–3.5)
LYMPHOCYTES NFR BLD: 53 % (ref 12–49)
MCH RBC QN AUTO: 32.7 PG (ref 26–34)
MCHC RBC AUTO-ENTMCNC: 33.8 G/DL (ref 30–36.5)
MCV RBC AUTO: 96.9 FL (ref 80–99)
MONOCYTES # BLD: 0.4 K/UL (ref 0–1)
MONOCYTES NFR BLD: 8 % (ref 5–13)
NEUTS SEG # BLD: 1.7 K/UL (ref 1.8–8)
NEUTS SEG NFR BLD: 37 % (ref 32–75)
NRBC # BLD: 0 K/UL (ref 0–0.01)
NRBC BLD-RTO: 0 PER 100 WBC
PLATELET # BLD AUTO: 424 K/UL (ref 150–400)
PMV BLD AUTO: 9.5 FL (ref 8.9–12.9)
POTASSIUM SERPL-SCNC: 3.6 MMOL/L (ref 3.5–5.1)
PROT SERPL-MCNC: 6.2 G/DL (ref 6.4–8.2)
RBC # BLD AUTO: 3.21 M/UL (ref 3.8–5.2)
SODIUM SERPL-SCNC: 138 MMOL/L (ref 136–145)
WBC # BLD AUTO: 4.6 K/UL (ref 3.6–11)

## 2019-06-26 PROCEDURE — 77030012965 HC NDL HUBR BBMI -A

## 2019-06-26 PROCEDURE — 96375 TX/PRO/DX INJ NEW DRUG ADDON: CPT

## 2019-06-26 PROCEDURE — 74011250636 HC RX REV CODE- 250/636: Performed by: INTERNAL MEDICINE

## 2019-06-26 PROCEDURE — 96417 CHEMO IV INFUS EACH ADDL SEQ: CPT

## 2019-06-26 PROCEDURE — 83001 ASSAY OF GONADOTROPIN (FSH): CPT

## 2019-06-26 PROCEDURE — 80053 COMPREHEN METABOLIC PANEL: CPT

## 2019-06-26 PROCEDURE — 82670 ASSAY OF TOTAL ESTRADIOL: CPT

## 2019-06-26 PROCEDURE — 74011250636 HC RX REV CODE- 250/636

## 2019-06-26 PROCEDURE — 96413 CHEMO IV INFUSION 1 HR: CPT

## 2019-06-26 PROCEDURE — 85025 COMPLETE CBC W/AUTO DIFF WBC: CPT

## 2019-06-26 PROCEDURE — 36415 COLL VENOUS BLD VENIPUNCTURE: CPT

## 2019-06-26 RX ORDER — DIPHENHYDRAMINE HYDROCHLORIDE 50 MG/ML
25 INJECTION, SOLUTION INTRAMUSCULAR; INTRAVENOUS ONCE
Status: COMPLETED | OUTPATIENT
Start: 2019-06-26 | End: 2019-06-26

## 2019-06-26 RX ORDER — SODIUM CHLORIDE 9 MG/ML
10 INJECTION INTRAMUSCULAR; INTRAVENOUS; SUBCUTANEOUS AS NEEDED
Status: ACTIVE | OUTPATIENT
Start: 2019-06-26 | End: 2019-06-26

## 2019-06-26 RX ORDER — SODIUM CHLORIDE 9 MG/ML
25 INJECTION, SOLUTION INTRAVENOUS CONTINUOUS
Status: DISPENSED | OUTPATIENT
Start: 2019-06-26 | End: 2019-06-26

## 2019-06-26 RX ORDER — SODIUM CHLORIDE 0.9 % (FLUSH) 0.9 %
10 SYRINGE (ML) INJECTION AS NEEDED
Status: ACTIVE | OUTPATIENT
Start: 2019-06-26 | End: 2019-06-26

## 2019-06-26 RX ORDER — DEXAMETHASONE SODIUM PHOSPHATE 4 MG/ML
4 INJECTION, SOLUTION INTRA-ARTICULAR; INTRALESIONAL; INTRAMUSCULAR; INTRAVENOUS; SOFT TISSUE ONCE
Status: COMPLETED | OUTPATIENT
Start: 2019-06-26 | End: 2019-06-26

## 2019-06-26 RX ORDER — HEPARIN 100 UNIT/ML
300-500 SYRINGE INTRAVENOUS AS NEEDED
Status: ACTIVE | OUTPATIENT
Start: 2019-06-26 | End: 2019-06-26

## 2019-06-26 RX ADMIN — FAMOTIDINE 20 MG: 10 INJECTION, SOLUTION INTRAVENOUS at 11:37

## 2019-06-26 RX ADMIN — PACLITAXEL 139 MG: 6 INJECTION INTRAVENOUS at 13:01

## 2019-06-26 RX ADMIN — Medication 500 UNITS: at 14:20

## 2019-06-26 RX ADMIN — Medication 10 ML: at 11:45

## 2019-06-26 RX ADMIN — SODIUM CHLORIDE 25 ML/HR: 0.9 INJECTION, SOLUTION INTRAVENOUS at 11:36

## 2019-06-26 RX ADMIN — Medication 10 ML: at 14:20

## 2019-06-26 RX ADMIN — DEXAMETHASONE SODIUM PHOSPHATE 4 MG: 4 INJECTION, SOLUTION INTRA-ARTICULAR; INTRALESIONAL; INTRAMUSCULAR; INTRAVENOUS; SOFT TISSUE at 11:46

## 2019-06-26 RX ADMIN — TRASTUZUMAB 130 MG: 150 INJECTION, POWDER, LYOPHILIZED, FOR SOLUTION INTRAVENOUS at 12:09

## 2019-06-26 RX ADMIN — DIPHENHYDRAMINE HYDROCHLORIDE 25 MG: 50 INJECTION INTRAMUSCULAR; INTRAVENOUS at 11:42

## 2019-06-26 NOTE — PROGRESS NOTES
[facility] [place] [facility] [place] [address] [facility], [address] [facility]  Jordyn Kelly: [phone]  F: [phone]      Reason for Visit:   Lewis Koch is a 52 y.o. female who is seen in consultation at the request of Dr. Erik Guevara for evaluation of systemic therapy for breast cancer. Consulting physician:  Dr. 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 Interval history:  In today for follow up. Complains of gr 2 loss of appetite, gr 2 bleeding, gr 1 diarrhea, gr 1 fatigue, gr 1 hair loss, gr 1 chills, gr 1 hot flashes, gr 2 insomnia, gr 1 cognition, gr 1 concentration, gr 1 anxiety, 2/10 pain to shoulder, gr 1 swelling, gr 1 headache.     FH:  Mother with breast cancer at age 45s and 46s; maternal aunt with breast cancer in her 46s; maternal aunt with breast cancer in her 46s; no ovarian, prostate, pancreas cancer    LMP 11/2018, spotty prior to that    Past Medical History:   Diagnosis Date    Adverse effect of anesthesia 1995    difficulty awakening      Past Surgical History:   Procedure Laterality Date    BREAST SURGERY PROCEDURE UNLISTED Left 1997    lumpectomy no lymph    COLONOSCOPY Left 12/14/2018    COLONOSCOPY performed by [facility] at [facility] ENDOSCOPY    HX ORTHOPAEDIC      foot surgery    IR INSERT TUNL CVC W PORT OVER 5 YEARS  4/18/2019      Social History     Tobacco Use    Smoking status: Former Smoker Packs/day: 1.00     Years: 20.00     Pack years: 20.00     Types: Cigarettes     Last attempt to quit: 04/2004     Years since quitting: 15.2    Smokeless tobacco: Never Used   Substance Use Topics    Alcohol use: Yes     Alcohol/week: 8.4 oz     Types: 14 Glasses of wine per week      Family History   Problem Relation Age of Onset    Breast Cancer Mother         42's 1st time late 52's 2nd time   Hector Holms Cancer Mother         breast CA x2    Hypertension Mother     Heart Disease Father     Cancer Maternal Aunt         breast    Cancer Maternal Aunt         breast    Cancer Maternal Cousin         Breast     Current Outpatient Medications   Medication Sig    OTHER Massage    Dx. C50.511 breast cancer    TURMERIC PO Take 2 Caps by mouth daily.  ALPRAZolam (XANAX) 0.25 mg tablet Take 0.25 mg by mouth.  spironolactone (ALDACTONE) 25 mg tablet Take  by mouth daily.  vit B Cmplx 3-FA-Vit C-Biotin (NEPHRO VINOD RX) 1- mg-mg-mcg tablet Take 1 Tab by mouth daily.  omega 3-dha-epa-fish oil (FISH OIL) 100-160-1,000 mg cap Take 1 Cap by mouth daily.  prochlorperazine (COMPAZINE) 10 mg tablet Take 1 Tab by mouth every six (6) hours as needed for Nausea.  lidocaine-prilocaine (EMLA) topical cream Apply  to affected area as needed for Pain.  ondansetron hcl (ZOFRAN) 8 mg tablet Take 1 Tab by mouth every twelve (12) hours as needed for Nausea. No current facility-administered medications for this visit. Allergies   Allergen Reactions    Ancef [Cefazolin] Hives    Penicillins Hives        Review of Systems: A complete review of systems was obtained, negative except as described above.     Physical Exam:     Visit Vitals  /69   Pulse 100   Temp 97.8 °F (36.6 °C) (Temporal)   Resp 18   Ht 5' 6\" (1.676 m)   Wt 145 lb 8 oz (66 kg)   SpO2 100%   BMI 23.48 kg/m²     ECOG PS: 0  General: No distress  Eyes: PERRLA, anicteric sclerae  HENT: Atraumatic, OP clear  Neck: Supple  Lymphatic: No cervical, supraclavicular, or inguinal adenopathy  Respiratory: CTAB, normal respiratory effort  CV: Normal rate, regular rhythm, no murmurs, no peripheral edema  GI: Soft, nontender, nondistended, no masses, no hepatomegaly, no splenomegaly  MS: Normal gait and station. Digits without clubbing or cyanosis. Skin: No rashes, ecchymoses, or petechiae. Normal temperature, turgor, and texture. Abrasion to right shin  Psych: Alert, oriented, appropriate affect, normal judgment/insight        Results:     Lab Results   Component Value Date/Time    WBC 4.6 06/26/2019 09:27 AM    HGB 10.5 (L) 06/26/2019 09:27 AM    HCT 31.1 (L) 06/26/2019 09:27 AM    PLATELET 990 (H) 52/33/1276 09:27 AM    MCV 96.9 06/26/2019 09:27 AM    ABS. NEUTROPHILS 1.7 (L) 06/26/2019 09:27 AM     Lab Results   Component Value Date/Time    Sodium 138 06/26/2019 09:27 AM    Potassium 3.6 06/26/2019 09:27 AM    Chloride 105 06/26/2019 09:27 AM    CO2 28 06/26/2019 09:27 AM    Glucose 91 06/26/2019 09:27 AM    BUN 10 06/26/2019 09:27 AM    Creatinine 0.73 06/26/2019 09:27 AM    GFR est AA >60 06/26/2019 09:27 AM    GFR est non-AA >60 06/26/2019 09:27 AM    Calcium 9.2 06/26/2019 09:27 AM     Lab Results   Component Value Date/Time    Bilirubin, total 0.7 06/26/2019 09:27 AM    ALT (SGPT) 25 06/26/2019 09:27 AM    AST (SGOT) 22 06/26/2019 09:27 AM    Alk. phosphatase 38 (L) 06/26/2019 09:27 AM    Protein, total 6.2 (L) 06/26/2019 09:27 AM    Albumin 3.5 06/26/2019 09:27 AM    Globulin 2.7 06/26/2019 09:27 AM         Records reviewed and summarized above. Pathology report(s) reviewed above. Radiology report(s) reviewed above. Assessment/plan:   1. Right LOQ IDC, 1.4 cm, gr 2, 0/2 LN, ER +, OR +, HER 2 POSITIVE:  Stage IA (both anatomic and prognostic). Likely postmenopausal    We explained to the patient that the goal of systemic adjuvant therapy is to improve the chances for cure and decrease the risk of relapse.  We explained why a patient can have microscopic cancer spread now even though physical examination, laboratory studies and imaging studies are negative for cancer. We explained that the same treatments used now as adjuvant or preventive treatments rarely if ever are curative in women who develop metastases. Luz Orozco suggests equivalency between q.3 week Adriamycin, Cytoxan followed by weekly paclitaxel and trastuzumab compared with the [facility] regimen. However, this study was not powered to show a difference between these two regimens, and in the [facility] publication, the AC-TH arm showed a numerically advantange (though not statistically significant) to the [facility] arm. In this patient, it is completely reasonable to use [facility] approach and avoid the potential cardiotoxicity of the anthracyclines as well as the potential for leukemia. We discussed the toxicities of docetaxel and carboplatin chemotherapy in detail. This chemotherapy frequently causes a low white blood cell count and hospital admissions for treatment of neutropenic fever. We explained that we consider the use of growth factors to minimize this risk. We explained to the patient that some side effects if they occur only last a few days including nausea, vomiting, stomatitis, arthralgia, myalgia,and allergic reactions to Taxotere. We told the patient that severe nausea and vomiting were uncommon and that some side effects,if they occur, will last longer; this includes hair loss, which will be seen in all patients treated with these agents and fatigue,which will be seen in most.  We also informed that for the patient that heart damage is rare with these agents. We explained that carboplatin can rarely cause kidney damage and high frequency hearing loss. We provided the patient in detail her information concerning the toxicities of this regimen in addition to her overall discussion.       Rationale for therapy with trastuzumab was also discussed with the patient including a 50% proportional improvement in disease free survival and also an improvement in overall survival in patients receiving trastuzumab and chemotherapy for HER-2 positive breast cancer. The side effects of trastuzumab were discussed including a 4%-5% risk of dropping her ejection fraction while on treatment and about a 1% risk of CHF. We discussed that this drug will be used every 3 weeks for remainder of a year following the chemotherapy cycles. We will check her EF before chemotherapy and every 3 months while she is receiving trastuzumab. · TCH (Trastuzumab 8mg/kg load with cycle 1 then 6mg/kg, Docetaxel 75mg/m2, Carboplatin AUC 6) given every 3 weeks x 6 cycles  · Labs: CBC, CMP prior to each treatment  · Antiemetic Prophylaxis: Palonosetron and dexamethasone prior to chemo  · PRN Antiemetics: Ondansetron, Compazine  · Swelling prophylaxis: Dexmethasone 8mg bid the day before, and day after chemotherapy  · TTE prior to chemotherapy and every 12 weeks while on Trastuzumab  · Neulasta 24-72 hours after each treatment    Also discussed the APT study results, weekly paclitaxel 80 mg/m2 x 12 with weekly trastuzumab (4 mg/kg load then 2 mg/kg)  followed by outback trastuzumab to complete one year. 42% were pT1c. I discussed the potential risks of paclitaxel chemotherapy with the patient. Major toxicities include nausea and vomiting, stomatitis, fatigue. We provided the patient with detailed information concerning toxicity including frequent toxicities that only last a few days, such as nausea, vomiting, mouth sores, arthralgia, myalgia, and potentially allergic reactions to paclitaxel, as well as toxicities which can be longer lasting including total alopecia, fatigue, anemia and neuropathy. We provided the patient with detailed information concerning the toxicities of their regimen in addition to our verbal discussion.     This is a reasonable regimen in this setting (only 4 distant recurrences over 7 years in this study, 7 year DFS was 95%)     After this discussion, she is agreeable to paclitaxel and trastuzumab as in APT, will start on 4/22. She has signed informed consent. TTE on 4/11/19, EF 64%, due 7/4/19, ordered. Port was placed. She wants this removed after taxol, will get trastuzumab peripherally    TH 10/12 today    The patient was given the following prescriptions with written and verbal instructions on how to use each:  Compazine, zofran,  a wig, and emla cream.      Discussed cold caps and cryotherapy with paclitaxel. She will not require XRT. Discussed endocrine therapy. The risks and benefits of tamoxifen were discussed in detail and the patient was informed of the following: Risks include a 1% risk of endometrial cancer for postmenopausal women treated for five years but no (or a minimally increased) risk in premenopausal women and that most women who develop tamoxifen-associated endometrial cancer can be cured. Any bleeding in a postmenopausal woman should be reported to a health care professional. There is also a 1% risk of blood clots (thromboembolism) that can be fatal. All patients irrespective of age who take tamoxifen and who have not had a hysterectomy should have a pelvic exam and Pap smear yearly. Tamoxifen increases the risk of cataract formation and on rare occasions has caused retinal damage: an eye exam is recommended yearly. Other risks include vaginal discharge or dryness, the development or worsening of hot flashes or vasomotor symptoms, and bone loss in premenopausal women. There is excellent evidence that tamoxifen does not increase risk of depression, cause weight gain or have a major effect on sexual function. Available data suggests little or no effect on cognitive function. Benefits include a lowering of cholesterol and a reduction in the rate of bone loss for postmenopausal woman. Any other symptoms should be reported.     The risks and benefits of aromatase inhibitors (anastrozole, letrozole, and exemestane) were discussed in detail and the patient was informed of the following: Risks include the development of painful muscles and joints (arthralgia/myalgia) and bone loss. Muscle and joint pain can be severe but rarely result in any tissue damage; symptoms usually resolve in several weeks when the medication is stopped. Bone loss is common and a bone density test is recommended as a baseline and then yearly to every several years depending on initial results. The risk of fractures is increased by a few percent in patients taking these drugs, but careful monitoring of bone density and using bone protecting agents when indicated can minimize these risks. Unlike tamoxifen there is no increased risk of blood clots or endometrial cancer. AIs can cause or worsen vaginal dryness but women using these drugs should not use vaginal estrogen preparations for these symptoms. AIs can also cause or increase hot flashes. Any other symptoms should be reported. Will make a final decision on endocrine therapy following chemotherapy. May need to check LH, FSH, estradiol, though likely postmenopausal.  Will check these labs today    Follow-up after early breast cancer was discussed. I recommend follow-up as defined by the American Society of Clinical Oncology and [facility]. This includes a visit to a health care professional every 3-6 months for 3 years, then every 6-12 months for 2 years, and then yearly as well as mammograms yearly. 2. Emotional well being:  She has excellent support and is coping well with her disease. 3. Diarrhea: Due to chemo. Intermittent. PRN Imodium. Will monitor. 4. Right arm pain:  Ongoing prior to treatment, intermittent but is improving. May be due to surgery. Previously referred to [facility]. She has met with [facility] and was given exercises which have helped. Will monitor. 5. Nosebleeds:  Due to chemo. Minor. Recommend nasal saline PRN. Will monitor. 6. Headaches:  Mild; likely due to therapy/antiemetics    7. LE swelling: Ongoing since last Thursday. Improves with elevation. Recommend elevation at rest, ambulation, and hydration. Also discussed patient to monitor for s/s of DVT. Will continue to monitor. I appreciate the opportunity to participate in Ms. Shanique Moctezuma's care. Signed By: Ja Zurita MD      No orders of the defined types were placed in this encounter.

## 2019-06-27 ENCOUNTER — TELEPHONE (OUTPATIENT)
Dept: ONCOLOGY | Age: 54
End: 2019-06-27

## 2019-06-27 NOTE — TELEPHONE ENCOUNTER
6/27/19 10:57 AM: Called to advise that she has been scheduled for an echocardiogram on Friday, 7/5, at 8 AM. No answer; left voicemail advising of this and encouraged patient to call back with questions or concerns.

## 2019-06-28 LAB — ESTRADIOL SERPL-MCNC: 12.9 PG/ML

## 2019-07-03 ENCOUNTER — HOSPITAL ENCOUNTER (OUTPATIENT)
Dept: INFUSION THERAPY | Age: 54
Discharge: HOME | End: 2019-07-03
Payer: COMMERCIAL

## 2019-07-03 VITALS
WEIGHT: 148 LBS | TEMPERATURE: 98.4 F | SYSTOLIC BLOOD PRESSURE: 133 MMHG | HEART RATE: 79 BPM | BODY MASS INDEX: 23.78 KG/M2 | HEIGHT: 66 IN | DIASTOLIC BLOOD PRESSURE: 87 MMHG | RESPIRATION RATE: 16 BRPM

## 2019-07-03 DIAGNOSIS — C50.911 MALIGNANT NEOPLASM OF RIGHT BREAST IN FEMALE, ESTROGEN RECEPTOR POSITIVE, UNSPECIFIED SITE OF BREAST (HCC): Primary | ICD-10-CM

## 2019-07-03 DIAGNOSIS — Z17.0 MALIGNANT NEOPLASM OF RIGHT BREAST IN FEMALE, ESTROGEN RECEPTOR POSITIVE, UNSPECIFIED SITE OF BREAST (HCC): Primary | ICD-10-CM

## 2019-07-03 LAB
BASO+EOS+MONOS # BLD AUTO: 0.3 K/UL (ref 0.2–1.2)
BASO+EOS+MONOS NFR BLD AUTO: 8 % (ref 3.2–16.9)
DIFFERENTIAL METHOD BLD: ABNORMAL
ERYTHROCYTE [DISTWIDTH] IN BLOOD BY AUTOMATED COUNT: 18.8 % (ref 11.8–15.8)
HCT VFR BLD AUTO: 29.8 % (ref 35–47)
HGB BLD-MCNC: 10.3 G/DL (ref 11.5–16)
LYMPHOCYTES # BLD: 1.9 K/UL (ref 0.8–3.5)
LYMPHOCYTES NFR BLD: 49 % (ref 12–49)
MCH RBC QN AUTO: 34.4 PG (ref 26–34)
MCHC RBC AUTO-ENTMCNC: 34.6 G/DL (ref 30–36.5)
MCV RBC AUTO: 99.7 FL (ref 80–99)
NEUTS SEG # BLD: 1.8 K/UL (ref 1.8–8)
NEUTS SEG NFR BLD: 44 % (ref 32–75)
PLATELET # BLD AUTO: 400 K/UL (ref 150–400)
RBC # BLD AUTO: 2.99 M/UL (ref 3.8–5.2)
WBC # BLD AUTO: 4 K/UL (ref 3.6–11)

## 2019-07-03 PROCEDURE — 74011250636 HC RX REV CODE- 250/636

## 2019-07-03 PROCEDURE — 77030012965 HC NDL HUBR BBMI -A

## 2019-07-03 PROCEDURE — 74011000250 HC RX REV CODE- 250: Performed by: INTERNAL MEDICINE

## 2019-07-03 PROCEDURE — 96417 CHEMO IV INFUS EACH ADDL SEQ: CPT

## 2019-07-03 PROCEDURE — 96413 CHEMO IV INFUSION 1 HR: CPT

## 2019-07-03 PROCEDURE — 96375 TX/PRO/DX INJ NEW DRUG ADDON: CPT

## 2019-07-03 PROCEDURE — 85025 COMPLETE CBC W/AUTO DIFF WBC: CPT

## 2019-07-03 PROCEDURE — 36415 COLL VENOUS BLD VENIPUNCTURE: CPT

## 2019-07-03 PROCEDURE — 74011250636 HC RX REV CODE- 250/636: Performed by: INTERNAL MEDICINE

## 2019-07-03 RX ORDER — SODIUM CHLORIDE 9 MG/ML
25 INJECTION, SOLUTION INTRAVENOUS CONTINUOUS
Status: DISPENSED | OUTPATIENT
Start: 2019-07-03 | End: 2019-07-03

## 2019-07-03 RX ORDER — SODIUM CHLORIDE 0.9 % (FLUSH) 0.9 %
10 SYRINGE (ML) INJECTION AS NEEDED
Status: ACTIVE | OUTPATIENT
Start: 2019-07-03 | End: 2019-07-03

## 2019-07-03 RX ORDER — HEPARIN 100 UNIT/ML
300-500 SYRINGE INTRAVENOUS AS NEEDED
Status: ACTIVE | OUTPATIENT
Start: 2019-07-03 | End: 2019-07-03

## 2019-07-03 RX ORDER — SODIUM CHLORIDE 9 MG/ML
10 INJECTION INTRAMUSCULAR; INTRAVENOUS; SUBCUTANEOUS AS NEEDED
Status: ACTIVE | OUTPATIENT
Start: 2019-07-03 | End: 2019-07-03

## 2019-07-03 RX ORDER — DIPHENHYDRAMINE HYDROCHLORIDE 50 MG/ML
25 INJECTION, SOLUTION INTRAMUSCULAR; INTRAVENOUS ONCE
Status: COMPLETED | OUTPATIENT
Start: 2019-07-03 | End: 2019-07-03

## 2019-07-03 RX ORDER — DEXAMETHASONE SODIUM PHOSPHATE 4 MG/ML
4 INJECTION, SOLUTION INTRA-ARTICULAR; INTRALESIONAL; INTRAMUSCULAR; INTRAVENOUS; SOFT TISSUE ONCE
Status: COMPLETED | OUTPATIENT
Start: 2019-07-03 | End: 2019-07-03

## 2019-07-03 RX ADMIN — DEXAMETHASONE SODIUM PHOSPHATE 4 MG: 4 INJECTION, SOLUTION INTRA-ARTICULAR; INTRALESIONAL; INTRAMUSCULAR; INTRAVENOUS; SOFT TISSUE at 11:02

## 2019-07-03 RX ADMIN — DIPHENHYDRAMINE HYDROCHLORIDE 25 MG: 50 INJECTION INTRAMUSCULAR; INTRAVENOUS at 10:58

## 2019-07-03 RX ADMIN — SODIUM CHLORIDE 10 ML: 9 INJECTION INTRAMUSCULAR; INTRAVENOUS; SUBCUTANEOUS at 09:27

## 2019-07-03 RX ADMIN — Medication 10 ML: at 13:30

## 2019-07-03 RX ADMIN — FAMOTIDINE 20 MG: 10 INJECTION, SOLUTION INTRAVENOUS at 10:54

## 2019-07-03 RX ADMIN — SODIUM CHLORIDE, PRESERVATIVE FREE 500 UNITS: 5 INJECTION INTRAVENOUS at 13:30

## 2019-07-03 RX ADMIN — SODIUM CHLORIDE 25 ML/HR: 900 INJECTION, SOLUTION INTRAVENOUS at 10:50

## 2019-07-03 RX ADMIN — Medication 10 ML: at 09:26

## 2019-07-03 RX ADMIN — PACLITAXEL 139 MG: 6 INJECTION, SOLUTION INTRAVENOUS at 12:21

## 2019-07-03 RX ADMIN — TRASTUZUMAB 130 MG: 150 INJECTION, POWDER, LYOPHILIZED, FOR SOLUTION INTRAVENOUS at 11:44

## 2019-07-03 NOTE — PROGRESS NOTES
[facility] - Chemotherapy Progress Note    [id number]  Pt admit to [facility] for Taxol/Herceptin C4D8 ambulatory in stable condition. Assessment completed. Patient complains of slight bilateral edema. Right side chest port accessed with positive blood return. EF from 4/11 is 64%. Patient scheduled for ECHO on 7/5. Labs drawn and sent for processing. Labs within treatment parameters. Chemotherapy Flowsheet 7/3/2019   Cycle C4D8   Date 7/3/2019   Drug / Regimen Taxol/Herceptin   Pre Meds -   Notes -     Patient Vitals for the past 12 hrs:   Temp Pulse Resp BP   07/03/19 1338 98.4 °F (36.9 °C) 79 16 133/87   07/03/19 0912 98 °F (36.7 °C) 87 16 127/68       Recent Results (from the past 12 hour(s))   CBC WITH 3 PART DIFF    Collection Time: 07/03/19  9:26 AM   Result Value Ref Range    WBC 4.0 3.6 - 11.0 K/uL    RBC 2.99 (L) 3.80 - 5.20 M/uL    HGB 10.3 (L) 11.5 - 16.0 g/dL    HCT 29.8 (L) 35.0 - 47.0 %    MCV 99.7 (H) 80.0 - 99.0 FL    MCH 34.4 (H) 26.0 - 34.0 PG    MCHC 34.6 30.0 - 36.5 g/dL    RDW 18.8 (H) 11.8 - 15.8 %    PLATELET 245 089 - 086 K/uL    NEUTROPHILS 44 32 - 75 %    MIXED CELLS 8 3.2 - 16.9 %    LYMPHOCYTES 49 12 - 49 %    ABS. NEUTROPHILS 1.8 1.8 - 8.0 K/UL    ABS. MIXED CELLS 0.3 0.2 - 1.2 K/uL    ABS. LYMPHOCYTES 1.9 0.8 - 3.5 K/UL    DF AUTOMATED       MD notified about recent lower extremity bilateral swelling. Plan is to continue with treatment. Medications:  IV push Pepcid  IV push Decadron  IV push Benadryl  IV Herceptin  IV Taxol    1340 Pt tolerated treatment well. Right side chest port maintained positive blood return throughout treatment, flushed with positive blood return at conclusion and deaccessed. D/c home ambulatory in no distress.  Pt aware of next appointment scheduled for 7/10 at 9am.

## 2019-07-05 ENCOUNTER — TELEPHONE (OUTPATIENT)
Dept: ONCOLOGY | Age: 54
End: 2019-07-05

## 2019-07-05 NOTE — TELEPHONE ENCOUNTER
7/5/19 5:18 PM: Returned call to patient. No answer; left voicemail advising that the scheduling team will be reaching out to her to arrange another appointment to have an echocardiogram done. Also advised that this echocardiogram will not be done in Dr. Kayce Hartley office since she needs one before treatment on 7/10. Encouraged patient to call back with questions or concerns.

## 2019-07-05 NOTE — TELEPHONE ENCOUNTER
Patient called and stated that she missed her echo appointment this morning and it needs to be rescheduled.      #[phone]

## 2019-07-09 ENCOUNTER — HOSPITAL ENCOUNTER (OUTPATIENT)
Dept: NON INVASIVE DIAGNOSTICS | Age: 54
Discharge: HOME | End: 2019-07-09
Attending: NURSE PRACTITIONER
Payer: COMMERCIAL

## 2019-07-09 VITALS
DIASTOLIC BLOOD PRESSURE: 69 MMHG | WEIGHT: 148 LBS | BODY MASS INDEX: 23.78 KG/M2 | HEIGHT: 66 IN | SYSTOLIC BLOOD PRESSURE: 137 MMHG

## 2019-07-09 DIAGNOSIS — Z51.11 ENCOUNTER FOR ANTINEOPLASTIC CHEMOTHERAPY: ICD-10-CM

## 2019-07-09 DIAGNOSIS — Z51.81 ENCOUNTER FOR MONITORING CARDIOTOXIC DRUG THERAPY: ICD-10-CM

## 2019-07-09 DIAGNOSIS — Z79.899 ENCOUNTER FOR MONITORING CARDIOTOXIC DRUG THERAPY: ICD-10-CM

## 2019-07-09 DIAGNOSIS — C50.511 MALIGNANT NEOPLASM OF LOWER-OUTER QUADRANT OF RIGHT BREAST OF FEMALE, ESTROGEN RECEPTOR POSITIVE (HCC): ICD-10-CM

## 2019-07-09 DIAGNOSIS — Z17.0 MALIGNANT NEOPLASM OF LOWER-OUTER QUADRANT OF RIGHT BREAST OF FEMALE, ESTROGEN RECEPTOR POSITIVE (HCC): ICD-10-CM

## 2019-07-09 LAB
ECHO AO ASC DIAM: 2.69 CM
ECHO AO ROOT DIAM: 3.1 CM
ECHO IVC SNIFF: 2.03 CM
ECHO LA AREA 4C: 15.2 CM2
ECHO LA MAJOR AXIS: 2.82 CM
ECHO LA TO AORTIC ROOT RATIO: 0.91
ECHO LA VOL 2C: 23.42 ML (ref 22–52)
ECHO LA VOL 4C: 37.86 ML (ref 22–52)
ECHO LA VOL BP: 31.5 ML (ref 22–52)
ECHO LA VOL/BSA BIPLANE: 17.9 ML/M2 (ref 16–28)
ECHO LA VOLUME INDEX A2C: 13.31 ML/M2 (ref 16–28)
ECHO LA VOLUME INDEX A4C: 21.51 ML/M2 (ref 16–28)
ECHO LV E' LATERAL VELOCITY: 12.71 CENTIMETER/SECOND
ECHO LV INTERNAL DIMENSION DIASTOLIC: 5 CM (ref 3.9–5.3)
ECHO LV INTERNAL DIMENSION SYSTOLIC: 3.05 CM
ECHO LV IVSD: 0.79 CM (ref 0.6–0.9)
ECHO LV MASS 2D: 154.5 G (ref 67–162)
ECHO LV MASS INDEX 2D: 87.8 G/M2 (ref 43–95)
ECHO LV POSTERIOR WALL DIASTOLIC: 0.8 CM (ref 0.6–0.9)
ECHO LVOT DIAM: 2.23 CM
ECHO MV A VELOCITY: 64.61 CM/S
ECHO MV AREA PHT: 4.6 CM2
ECHO MV E DECELERATION TIME (DT): 166 MS
ECHO MV E VELOCITY: 70.56 CM/S
ECHO MV E/A RATIO: 1.09
ECHO MV PRESSURE HALF TIME (PHT): 48.1 MS
ECHO RV INTERNAL DIMENSION: 3.15 CM
ECHO RV TAPSE: 1.61 CM (ref 1.5–2)

## 2019-07-09 PROCEDURE — 93306 TTE W/DOPPLER COMPLETE: CPT

## 2019-07-10 ENCOUNTER — HOSPITAL ENCOUNTER (OUTPATIENT)
Dept: INFUSION THERAPY | Age: 54
Discharge: HOME | End: 2019-07-10
Payer: COMMERCIAL

## 2019-07-10 VITALS
DIASTOLIC BLOOD PRESSURE: 67 MMHG | SYSTOLIC BLOOD PRESSURE: 141 MMHG | TEMPERATURE: 98.1 F | BODY MASS INDEX: 23.8 KG/M2 | HEART RATE: 83 BPM | HEIGHT: 66 IN | WEIGHT: 148.1 LBS | RESPIRATION RATE: 18 BRPM

## 2019-07-10 DIAGNOSIS — Z17.0 MALIGNANT NEOPLASM OF RIGHT BREAST IN FEMALE, ESTROGEN RECEPTOR POSITIVE, UNSPECIFIED SITE OF BREAST (HCC): Primary | ICD-10-CM

## 2019-07-10 DIAGNOSIS — C50.911 MALIGNANT NEOPLASM OF RIGHT BREAST IN FEMALE, ESTROGEN RECEPTOR POSITIVE, UNSPECIFIED SITE OF BREAST (HCC): Primary | ICD-10-CM

## 2019-07-10 LAB
BASO+EOS+MONOS # BLD AUTO: 0.2 K/UL (ref 0.2–1.2)
BASO+EOS+MONOS NFR BLD AUTO: 5 % (ref 3.2–16.9)
DIFFERENTIAL METHOD BLD: ABNORMAL
ERYTHROCYTE [DISTWIDTH] IN BLOOD BY AUTOMATED COUNT: 18.6 % (ref 11.8–15.8)
HCT VFR BLD AUTO: 31.1 % (ref 35–47)
HGB BLD-MCNC: 10.9 G/DL (ref 11.5–16)
LYMPHOCYTES # BLD: 1.7 K/UL (ref 0.8–3.5)
LYMPHOCYTES NFR BLD: 45 % (ref 12–49)
MCH RBC QN AUTO: 35.4 PG (ref 26–34)
MCHC RBC AUTO-ENTMCNC: 35 G/DL (ref 30–36.5)
MCV RBC AUTO: 101 FL (ref 80–99)
NEUTS SEG # BLD: 1.8 K/UL (ref 1.8–8)
NEUTS SEG NFR BLD: 50 % (ref 32–75)
PLATELET # BLD AUTO: 403 K/UL (ref 150–400)
RBC # BLD AUTO: 3.08 M/UL (ref 3.8–5.2)
WBC # BLD AUTO: 3.7 K/UL (ref 3.6–11)

## 2019-07-10 PROCEDURE — 77030012965 HC NDL HUBR BBMI -A

## 2019-07-10 PROCEDURE — 96417 CHEMO IV INFUS EACH ADDL SEQ: CPT

## 2019-07-10 PROCEDURE — 74011000250 HC RX REV CODE- 250: Performed by: INTERNAL MEDICINE

## 2019-07-10 PROCEDURE — 74011250636 HC RX REV CODE- 250/636: Performed by: INTERNAL MEDICINE

## 2019-07-10 PROCEDURE — 96375 TX/PRO/DX INJ NEW DRUG ADDON: CPT

## 2019-07-10 PROCEDURE — 74011250636 HC RX REV CODE- 250/636

## 2019-07-10 PROCEDURE — 85025 COMPLETE CBC W/AUTO DIFF WBC: CPT

## 2019-07-10 PROCEDURE — 36415 COLL VENOUS BLD VENIPUNCTURE: CPT

## 2019-07-10 PROCEDURE — 96413 CHEMO IV INFUSION 1 HR: CPT

## 2019-07-10 RX ORDER — DEXAMETHASONE SODIUM PHOSPHATE 4 MG/ML
4 INJECTION, SOLUTION INTRA-ARTICULAR; INTRALESIONAL; INTRAMUSCULAR; INTRAVENOUS; SOFT TISSUE ONCE
Status: COMPLETED | OUTPATIENT
Start: 2019-07-10 | End: 2019-07-10

## 2019-07-10 RX ORDER — HEPARIN 100 UNIT/ML
300-500 SYRINGE INTRAVENOUS AS NEEDED
Status: ACTIVE | OUTPATIENT
Start: 2019-07-10 | End: 2019-07-10

## 2019-07-10 RX ORDER — SODIUM CHLORIDE 0.9 % (FLUSH) 0.9 %
10 SYRINGE (ML) INJECTION AS NEEDED
Status: ACTIVE | OUTPATIENT
Start: 2019-07-10 | End: 2019-07-10

## 2019-07-10 RX ORDER — SODIUM CHLORIDE 9 MG/ML
25 INJECTION, SOLUTION INTRAVENOUS CONTINUOUS
Status: DISPENSED | OUTPATIENT
Start: 2019-07-10 | End: 2019-07-10

## 2019-07-10 RX ORDER — DIPHENHYDRAMINE HYDROCHLORIDE 50 MG/ML
25 INJECTION, SOLUTION INTRAMUSCULAR; INTRAVENOUS ONCE
Status: COMPLETED | OUTPATIENT
Start: 2019-07-10 | End: 2019-07-10

## 2019-07-10 RX ORDER — SODIUM CHLORIDE 9 MG/ML
10 INJECTION INTRAMUSCULAR; INTRAVENOUS; SUBCUTANEOUS AS NEEDED
Status: ACTIVE | OUTPATIENT
Start: 2019-07-10 | End: 2019-07-10

## 2019-07-10 RX ADMIN — Medication 10 ML: at 09:44

## 2019-07-10 RX ADMIN — SODIUM CHLORIDE 10 ML: 9 INJECTION, SOLUTION INTRAMUSCULAR; INTRAVENOUS; SUBCUTANEOUS at 09:44

## 2019-07-10 RX ADMIN — TRASTUZUMAB 130 MG: 150 INJECTION, POWDER, LYOPHILIZED, FOR SOLUTION INTRAVENOUS at 11:25

## 2019-07-10 RX ADMIN — Medication 500 UNITS: at 13:19

## 2019-07-10 RX ADMIN — PACLITAXEL 139 MG: 6 INJECTION INTRAVENOUS at 12:10

## 2019-07-10 RX ADMIN — Medication 10 ML: at 13:19

## 2019-07-10 RX ADMIN — DIPHENHYDRAMINE HYDROCHLORIDE 25 MG: 50 INJECTION INTRAMUSCULAR; INTRAVENOUS at 10:19

## 2019-07-10 RX ADMIN — SODIUM CHLORIDE 25 ML/HR: 900 INJECTION, SOLUTION INTRAVENOUS at 10:18

## 2019-07-10 RX ADMIN — FAMOTIDINE 20 MG: 10 INJECTION, SOLUTION INTRAVENOUS at 10:22

## 2019-07-10 RX ADMIN — DEXAMETHASONE SODIUM PHOSPHATE 4 MG: 4 INJECTION, SOLUTION INTRA-ARTICULAR; INTRALESIONAL; INTRAMUSCULAR; INTRAVENOUS; SOFT TISSUE at 10:19

## 2019-07-10 NOTE — PROGRESS NOTES
[facility] Progress Note    Date: July 10, 2019    Name: Adina Orantes    MRN: [id number]         : 1965    Ms. Bakari Harris Arrived ambulatory and in no distress for C4D15 of Taxol/Herceptin Regimen. Assessment was completed, no acute issues at this time, no new complaints voiced. Right chest wall port accessed without difficulty, labs drawn & sent for processing. Ms. Moctezuma's vitals were reviewed. Patient Vitals for the past 12 hrs:   Temp Pulse Resp BP   07/10/19 1322 98.1 °F (36.7 °C) 83 18 141/67   07/10/19 0931 97.9 °F (36.6 °C) 89 18 142/72     Lab results were obtained and reviewed. Echo done 19. EF 64% and within parameters. Recent Results (from the past 12 hour(s))   CBC WITH 3 PART DIFF    Collection Time: 07/10/19  9:42 AM   Result Value Ref Range    WBC 3.7 3.6 - 11.0 K/uL    RBC 3.08 (L) 3.80 - 5.20 M/uL    HGB 10.9 (L) 11.5 - 16.0 g/dL    HCT 31.1 (L) 35.0 - 47.0 %    .0 (H) 80.0 - 99.0 FL    MCH 35.4 (H) 26.0 - 34.0 PG    MCHC 35.0 30.0 - 36.5 g/dL    RDW 18.6 (H) 11.8 - 15.8 %    PLATELET 956 (H) 218 - 400 K/uL    NEUTROPHILS 50 32 - 75 %    MIXED CELLS 5 3.2 - 16.9 %    LYMPHOCYTES 45 12 - 49 %    ABS. NEUTROPHILS 1.8 1.8 - 8.0 K/UL    ABS. MIXED CELLS 0.2 0.2 - 1.2 K/uL    ABS.  LYMPHOCYTES 1.7 0.8 - 3.5 K/UL    DF AUTOMATED         Medications:  Medications Administered     0.9% sodium chloride infusion     Admin Date  07/10/2019 Action  New Bag Dose  25 mL/hr Rate  25 mL/hr Route  IntraVENous Administered By  Rowan Dietrich RN          dexamethasone (DECADRON) 4 mg/mL injection 4 mg     Admin Date  07/10/2019 Action  Given Dose  4 mg Route  IntraVENous Administered By  Rowan Dietrich RN          diphenhydrAMINE (BENADRYL) injection 25 mg     Admin Date  07/10/2019 Action  Given Dose  25 mg Route  IntraVENous Administered By  Rowan Dietrich RN          famotidine (PF) (PEPCID) 20 mg in sodium chloride 0.9% 10 mL injection     Admin Date  07/10/2019 Action  Given Dose  20 mg Route  IntraVENous Administered By  Carolynn Blair RN          heparin (porcine) pf 300-500 Units     Admin Date  07/10/2019 Action  Given Dose  500 Units Route  InterCATHeter Administered By  Carolynn Blair RN          PACLitaxel (TAXOL) 139 mg in 0.9% sodium chloride 250 mL, overfill volume 25 mL chemo infusion     Admin Date  07/10/2019 Action  New Bag Dose  139 mg Rate  298.2 mL/hr Route  IntraVENous Administered By  Carolynn Blair RN          saline peripheral flush soln 10 mL     Admin Date  07/10/2019 Action  Given Dose  10 mL Route  InterCATHeter Administered By  Carolynn Blair RN           Admin Date  07/10/2019 Action  Given Dose  10 mL Route  InterCATHeter Administered By  Carolynn Blair, ELIZABETH          sodium chloride 0.9% injection 10 mL     Admin Date  07/10/2019 Action  Given Dose  10 mL Route  IntraVENous Administered By  Carolynn Blair RN          trastuzumab (HERCEPTIN) 130 mg in 0.9% sodium chloride 250 mL, overfill volume 25 mL IVPB     Admin Date  07/10/2019 Action  New Bag Dose  130 mg Rate  562.4 mL/hr Route  IntraVENous Administered By  Carolynn Blair RN                Ms. Allyson Boeck tolerated treatment well and was discharged from [place] [address] in stable condition. Port de-accessed, flushed & heparinized per protocol. She is to return on 7/17/19 at 9:00 for her next appointment.     Juanita Fountain RN  July 10, 2019

## 2019-07-14 RX ORDER — ACETAMINOPHEN 325 MG/1
650 TABLET ORAL
Status: CANCELLED | OUTPATIENT
Start: 2019-07-17

## 2019-07-14 RX ORDER — DIPHENHYDRAMINE HYDROCHLORIDE 50 MG/ML
50 INJECTION, SOLUTION INTRAMUSCULAR; INTRAVENOUS
Status: CANCELLED | OUTPATIENT
Start: 2019-08-28

## 2019-07-14 RX ORDER — DIPHENHYDRAMINE HYDROCHLORIDE 50 MG/ML
50 INJECTION, SOLUTION INTRAMUSCULAR; INTRAVENOUS
Status: CANCELLED | OUTPATIENT
Start: 2019-07-17

## 2019-07-14 RX ORDER — ACETAMINOPHEN 325 MG/1
650 TABLET ORAL AS NEEDED
Status: CANCELLED
Start: 2019-09-04

## 2019-07-14 RX ORDER — EPINEPHRINE 1 MG/ML
0.3 INJECTION, SOLUTION, CONCENTRATE INTRAVENOUS AS NEEDED
Status: CANCELLED | OUTPATIENT
Start: 2019-09-25

## 2019-07-14 RX ORDER — DIPHENHYDRAMINE HYDROCHLORIDE 50 MG/ML
50 INJECTION, SOLUTION INTRAMUSCULAR; INTRAVENOUS AS NEEDED
Status: CANCELLED
Start: 2019-09-25

## 2019-07-14 RX ORDER — ONDANSETRON 2 MG/ML
8 INJECTION INTRAMUSCULAR; INTRAVENOUS AS NEEDED
Status: CANCELLED | OUTPATIENT
Start: 2019-09-25

## 2019-07-14 RX ORDER — HEPARIN 100 UNIT/ML
300-500 SYRINGE INTRAVENOUS AS NEEDED
Status: CANCELLED
Start: 2019-07-17

## 2019-07-14 RX ORDER — ACETAMINOPHEN 325 MG/1
650 TABLET ORAL AS NEEDED
Status: CANCELLED
Start: 2019-08-07

## 2019-07-14 RX ORDER — EPINEPHRINE 1 MG/ML
0.3 INJECTION, SOLUTION, CONCENTRATE INTRAVENOUS AS NEEDED
Status: CANCELLED | OUTPATIENT
Start: 2019-07-17

## 2019-07-14 RX ORDER — DIPHENHYDRAMINE HYDROCHLORIDE 50 MG/ML
50 INJECTION, SOLUTION INTRAMUSCULAR; INTRAVENOUS AS NEEDED
Status: CANCELLED
Start: 2019-08-07

## 2019-07-14 RX ORDER — ONDANSETRON 2 MG/ML
8 INJECTION INTRAMUSCULAR; INTRAVENOUS AS NEEDED
Status: CANCELLED | OUTPATIENT
Start: 2019-09-04

## 2019-07-14 RX ORDER — ACETAMINOPHEN 325 MG/1
650 TABLET ORAL
Status: CANCELLED | OUTPATIENT
Start: 2019-08-28

## 2019-07-14 RX ORDER — HYDROCORTISONE SODIUM SUCCINATE 100 MG/2ML
100 INJECTION, POWDER, FOR SOLUTION INTRAMUSCULAR; INTRAVENOUS AS NEEDED
Status: CANCELLED | OUTPATIENT
Start: 2019-09-25

## 2019-07-14 RX ORDER — DIPHENHYDRAMINE HYDROCHLORIDE 50 MG/ML
50 INJECTION, SOLUTION INTRAMUSCULAR; INTRAVENOUS AS NEEDED
Status: CANCELLED
Start: 2019-07-17

## 2019-07-14 RX ORDER — SODIUM CHLORIDE 0.9 % (FLUSH) 0.9 %
10 SYRINGE (ML) INJECTION AS NEEDED
Status: CANCELLED
Start: 2019-07-17

## 2019-07-14 RX ORDER — SODIUM CHLORIDE 9 MG/ML
10 INJECTION INTRAMUSCULAR; INTRAVENOUS; SUBCUTANEOUS AS NEEDED
Status: CANCELLED | OUTPATIENT
Start: 2019-09-18

## 2019-07-14 RX ORDER — HEPARIN 100 UNIT/ML
300-500 SYRINGE INTRAVENOUS AS NEEDED
Status: CANCELLED
Start: 2019-09-18

## 2019-07-14 RX ORDER — SODIUM CHLORIDE 9 MG/ML
25 INJECTION, SOLUTION INTRAVENOUS CONTINUOUS
Status: CANCELLED | OUTPATIENT
Start: 2019-07-17

## 2019-07-14 RX ORDER — HEPARIN 100 UNIT/ML
300-500 SYRINGE INTRAVENOUS AS NEEDED
Status: CANCELLED
Start: 2019-08-28

## 2019-07-14 RX ORDER — ACETAMINOPHEN 325 MG/1
650 TABLET ORAL AS NEEDED
Status: CANCELLED
Start: 2019-07-17

## 2019-07-14 RX ORDER — HYDROCORTISONE SODIUM SUCCINATE 100 MG/2ML
100 INJECTION, POWDER, FOR SOLUTION INTRAMUSCULAR; INTRAVENOUS AS NEEDED
Status: CANCELLED | OUTPATIENT
Start: 2019-09-04

## 2019-07-14 RX ORDER — ACETAMINOPHEN 325 MG/1
650 TABLET ORAL AS NEEDED
Status: CANCELLED
Start: 2019-09-25

## 2019-07-14 RX ORDER — DIPHENHYDRAMINE HYDROCHLORIDE 50 MG/ML
50 INJECTION, SOLUTION INTRAMUSCULAR; INTRAVENOUS
Status: CANCELLED | OUTPATIENT
Start: 2019-08-07

## 2019-07-14 RX ORDER — DIPHENHYDRAMINE HYDROCHLORIDE 50 MG/ML
50 INJECTION, SOLUTION INTRAMUSCULAR; INTRAVENOUS
Status: CANCELLED | OUTPATIENT
Start: 2019-09-18

## 2019-07-14 RX ORDER — SODIUM CHLORIDE 9 MG/ML
10 INJECTION INTRAMUSCULAR; INTRAVENOUS; SUBCUTANEOUS AS NEEDED
Status: CANCELLED | OUTPATIENT
Start: 2019-07-17

## 2019-07-14 RX ORDER — HYDROCORTISONE SODIUM SUCCINATE 100 MG/2ML
100 INJECTION, POWDER, FOR SOLUTION INTRAMUSCULAR; INTRAVENOUS AS NEEDED
Status: CANCELLED | OUTPATIENT
Start: 2019-08-07

## 2019-07-14 RX ORDER — ONDANSETRON 2 MG/ML
8 INJECTION INTRAMUSCULAR; INTRAVENOUS AS NEEDED
Status: CANCELLED | OUTPATIENT
Start: 2019-07-17

## 2019-07-14 RX ORDER — SODIUM CHLORIDE 9 MG/ML
25 INJECTION, SOLUTION INTRAVENOUS CONTINUOUS
Status: CANCELLED | OUTPATIENT
Start: 2019-08-28

## 2019-07-14 RX ORDER — ACETAMINOPHEN 325 MG/1
650 TABLET ORAL
Status: CANCELLED | OUTPATIENT
Start: 2019-08-07

## 2019-07-14 RX ORDER — HYDROCORTISONE SODIUM SUCCINATE 100 MG/2ML
100 INJECTION, POWDER, FOR SOLUTION INTRAMUSCULAR; INTRAVENOUS AS NEEDED
Status: CANCELLED | OUTPATIENT
Start: 2019-07-17

## 2019-07-14 RX ORDER — ONDANSETRON 2 MG/ML
8 INJECTION INTRAMUSCULAR; INTRAVENOUS AS NEEDED
Status: CANCELLED | OUTPATIENT
Start: 2019-08-07

## 2019-07-14 RX ORDER — HEPARIN 100 UNIT/ML
300-500 SYRINGE INTRAVENOUS AS NEEDED
Status: CANCELLED
Start: 2019-08-07

## 2019-07-14 RX ORDER — ALBUTEROL SULFATE 0.83 MG/ML
2.5 SOLUTION RESPIRATORY (INHALATION) AS NEEDED
Status: CANCELLED
Start: 2019-08-07

## 2019-07-14 RX ORDER — SODIUM CHLORIDE 0.9 % (FLUSH) 0.9 %
10 SYRINGE (ML) INJECTION AS NEEDED
Status: CANCELLED
Start: 2019-08-07

## 2019-07-14 RX ORDER — SODIUM CHLORIDE 9 MG/ML
10 INJECTION INTRAMUSCULAR; INTRAVENOUS; SUBCUTANEOUS AS NEEDED
Status: CANCELLED | OUTPATIENT
Start: 2019-08-28

## 2019-07-14 RX ORDER — EPINEPHRINE 1 MG/ML
0.3 INJECTION, SOLUTION, CONCENTRATE INTRAVENOUS AS NEEDED
Status: CANCELLED | OUTPATIENT
Start: 2019-08-07

## 2019-07-14 RX ORDER — SODIUM CHLORIDE 0.9 % (FLUSH) 0.9 %
10 SYRINGE (ML) INJECTION AS NEEDED
Status: CANCELLED
Start: 2019-08-28

## 2019-07-14 RX ORDER — EPINEPHRINE 1 MG/ML
0.3 INJECTION, SOLUTION, CONCENTRATE INTRAVENOUS AS NEEDED
Status: CANCELLED | OUTPATIENT
Start: 2019-09-04

## 2019-07-14 RX ORDER — ACETAMINOPHEN 325 MG/1
650 TABLET ORAL
Status: CANCELLED | OUTPATIENT
Start: 2019-09-18

## 2019-07-14 RX ORDER — DIPHENHYDRAMINE HYDROCHLORIDE 50 MG/ML
50 INJECTION, SOLUTION INTRAMUSCULAR; INTRAVENOUS AS NEEDED
Status: CANCELLED
Start: 2019-09-04

## 2019-07-14 RX ORDER — SODIUM CHLORIDE 9 MG/ML
25 INJECTION, SOLUTION INTRAVENOUS CONTINUOUS
Status: CANCELLED | OUTPATIENT
Start: 2019-09-18

## 2019-07-14 RX ORDER — SODIUM CHLORIDE 9 MG/ML
10 INJECTION INTRAMUSCULAR; INTRAVENOUS; SUBCUTANEOUS AS NEEDED
Status: CANCELLED | OUTPATIENT
Start: 2019-08-07

## 2019-07-14 RX ORDER — SODIUM CHLORIDE 0.9 % (FLUSH) 0.9 %
10 SYRINGE (ML) INJECTION AS NEEDED
Status: CANCELLED
Start: 2019-09-18

## 2019-07-14 RX ORDER — ALBUTEROL SULFATE 0.83 MG/ML
2.5 SOLUTION RESPIRATORY (INHALATION) AS NEEDED
Status: CANCELLED
Start: 2019-09-25

## 2019-07-14 RX ORDER — ALBUTEROL SULFATE 0.83 MG/ML
2.5 SOLUTION RESPIRATORY (INHALATION) AS NEEDED
Status: CANCELLED
Start: 2019-07-17

## 2019-07-14 RX ORDER — ALBUTEROL SULFATE 0.83 MG/ML
2.5 SOLUTION RESPIRATORY (INHALATION) AS NEEDED
Status: CANCELLED
Start: 2019-09-04

## 2019-07-14 RX ORDER — SODIUM CHLORIDE 9 MG/ML
25 INJECTION, SOLUTION INTRAVENOUS CONTINUOUS
Status: CANCELLED | OUTPATIENT
Start: 2019-08-07

## 2019-07-17 ENCOUNTER — HOSPITAL ENCOUNTER (OUTPATIENT)
Dept: INFUSION THERAPY | Age: 54
Discharge: HOME | End: 2019-07-17
Payer: COMMERCIAL

## 2019-07-17 ENCOUNTER — OFFICE VISIT (OUTPATIENT)
Dept: ONCOLOGY | Age: 54
End: 2019-07-17

## 2019-07-17 VITALS
SYSTOLIC BLOOD PRESSURE: 105 MMHG | BODY MASS INDEX: 23.9 KG/M2 | RESPIRATION RATE: 18 BRPM | DIASTOLIC BLOOD PRESSURE: 58 MMHG | WEIGHT: 148.1 LBS | TEMPERATURE: 96.3 F | HEART RATE: 74 BPM

## 2019-07-17 VITALS
SYSTOLIC BLOOD PRESSURE: 105 MMHG | DIASTOLIC BLOOD PRESSURE: 58 MMHG | OXYGEN SATURATION: 97 % | WEIGHT: 148.1 LBS | BODY MASS INDEX: 23.8 KG/M2 | HEART RATE: 74 BPM | TEMPERATURE: 96.3 F | RESPIRATION RATE: 18 BRPM | HEIGHT: 66 IN

## 2019-07-17 DIAGNOSIS — C50.911 MALIGNANT NEOPLASM OF RIGHT BREAST IN FEMALE, ESTROGEN RECEPTOR POSITIVE, UNSPECIFIED SITE OF BREAST (HCC): Primary | ICD-10-CM

## 2019-07-17 DIAGNOSIS — R19.7 DIARRHEA, UNSPECIFIED TYPE: ICD-10-CM

## 2019-07-17 DIAGNOSIS — Z51.11 ENCOUNTER FOR ANTINEOPLASTIC CHEMOTHERAPY: ICD-10-CM

## 2019-07-17 DIAGNOSIS — Z17.0 MALIGNANT NEOPLASM OF LOWER-OUTER QUADRANT OF RIGHT BREAST OF FEMALE, ESTROGEN RECEPTOR POSITIVE (HCC): Primary | ICD-10-CM

## 2019-07-17 DIAGNOSIS — Z78.0 POSTMENOPAUSAL: ICD-10-CM

## 2019-07-17 DIAGNOSIS — Z17.0 MALIGNANT NEOPLASM OF RIGHT BREAST IN FEMALE, ESTROGEN RECEPTOR POSITIVE, UNSPECIFIED SITE OF BREAST (HCC): Primary | ICD-10-CM

## 2019-07-17 DIAGNOSIS — C50.511 MALIGNANT NEOPLASM OF LOWER-OUTER QUADRANT OF RIGHT BREAST OF FEMALE, ESTROGEN RECEPTOR POSITIVE (HCC): Primary | ICD-10-CM

## 2019-07-17 PROCEDURE — 74011250636 HC RX REV CODE- 250/636: Performed by: INTERNAL MEDICINE

## 2019-07-17 PROCEDURE — 77030012965 HC NDL HUBR BBMI -A

## 2019-07-17 PROCEDURE — 74011000250 HC RX REV CODE- 250: Performed by: INTERNAL MEDICINE

## 2019-07-17 PROCEDURE — 74011250636 HC RX REV CODE- 250/636

## 2019-07-17 PROCEDURE — 96413 CHEMO IV INFUSION 1 HR: CPT

## 2019-07-17 RX ORDER — HEPARIN 100 UNIT/ML
300-500 SYRINGE INTRAVENOUS AS NEEDED
Status: ACTIVE | OUTPATIENT
Start: 2019-07-17 | End: 2019-07-17

## 2019-07-17 RX ORDER — SODIUM CHLORIDE 9 MG/ML
10 INJECTION INTRAMUSCULAR; INTRAVENOUS; SUBCUTANEOUS AS NEEDED
Status: ACTIVE | OUTPATIENT
Start: 2019-07-17 | End: 2019-07-17

## 2019-07-17 RX ORDER — SODIUM CHLORIDE 0.9 % (FLUSH) 0.9 %
10 SYRINGE (ML) INJECTION AS NEEDED
Status: ACTIVE | OUTPATIENT
Start: 2019-07-17 | End: 2019-07-17

## 2019-07-17 RX ORDER — SODIUM CHLORIDE 9 MG/ML
25 INJECTION, SOLUTION INTRAVENOUS CONTINUOUS
Status: DISPENSED | OUTPATIENT
Start: 2019-07-17 | End: 2019-07-17

## 2019-07-17 RX ADMIN — SODIUM CHLORIDE 25 ML/HR: 900 INJECTION, SOLUTION INTRAVENOUS at 09:29

## 2019-07-17 RX ADMIN — Medication 10 ML: at 10:25

## 2019-07-17 RX ADMIN — SODIUM CHLORIDE 10 ML: 9 INJECTION, SOLUTION INTRAMUSCULAR; INTRAVENOUS; SUBCUTANEOUS at 09:29

## 2019-07-17 RX ADMIN — Medication 500 UNITS: at 10:25

## 2019-07-17 RX ADMIN — Medication 10 ML: at 09:29

## 2019-07-17 RX ADMIN — TRASTUZUMAB 389 MG: 150 INJECTION, POWDER, LYOPHILIZED, FOR SOLUTION INTRAVENOUS at 09:55

## 2019-07-17 NOTE — PROGRESS NOTES
[facility] Progress Note    Date: 2019    Name: Marguerite Mack    MRN: [id number]         : 1965    Ms. Leo Ovalles Arrived ambulatory and in no distress for C5D1 of Herceptin Regimen. Assessment was completed, no acute issues at this time, no new complaints voiced. Right chest wall port accessed without difficulty, labs drawn & sent for processing. Echo reviewed from 19/ EF 63%, within treatment parameters. MD gave OK to infuse Herceptin prior tp appt. Ms. Moctezuma's vitals were reviewed. Patient Vitals for the past 12 hrs:   Temp Pulse Resp BP   19 0927 98 °F (36.7 °C) 83 18 117/54       Medications:  Herceptin IV    Patient proceed to appointment with Dr. Sherrell Corona. Ms. Leo Ovalles tolerated treatment well and was discharged from [place] [address] in stable condition. Port de-accessed, flushed & heparinized per protocol. She is to return on 19at 10:00 for her next appointment.     Shayna Disla RN  2019

## 2019-07-17 NOTE — PROGRESS NOTES
[facility] [place] [facility] [place] [address] [facility]  Herb Neas: [phone]  F: [phone]      Reason for Visit:   Lay Mejia is a 62 y.o. female who is seen in consultation at the request of Dr. Gely Alejo for evaluation of systemic therapy for breast cancer. Consulting physician:  Dr. Garrett Perez    Treatment History:   · 12/10/18 right breast US bx:  IMC, gr 1, 0.12 cm (1.2 mm); insufficient for receptors  · 1/25/19 right breast core bx:  11:00 lobular intraepithelial neoplasia; right breast core bx 7:00:  DCIS, gr 2-3, cribriform, 1.4 cm, ER + at 94%, HI + at 54%  · 2/28/19 right mastectomy : LOQ IDC, 1.4 cm, ER + at 89%, HI + at 78%, HER 2 POSITIVE (IHC 2+, FISH ratio 3.3; sig/cell 9.2) ; ki67 22%, gr 2, DCIS present and extensive, 0/2 LN; pT1c pN0 cM0  · Myriad Myrisk negative  · TH 4/22/19-7/10/19  · Outback Herceptin 7/17/19-      History of Present Illness: An abnormal mammogram 11/2018 led to the pathology above. Interval history:  In today for follow up. Complains of gr 1 loss of appetite, gr 1 bleeding, gr 1 diarrhea, gr 1 fatigue, gr 1 hair loss, gr 1 chills, gr 1 hot flashes, gr 1 insomnia, gr 1 cognition/concentration, gr 1 pain to shoulder, gr 1 skin rash, gr 1 neuropathy, gr 1 swelling.      FH:  Mother with breast cancer at age 45s and 46s; maternal aunt with breast cancer in her 46s; maternal aunt with breast cancer in her 46s; no ovarian, prostate, pancreas cancer    LMP 11/2018, spotty prior to that    Past Medical History:   Diagnosis Date    Adverse effect of anesthesia 1995    difficulty awakening      Past Surgical History:   Procedure Laterality Date    BREAST SURGERY PROCEDURE UNLISTED Left 1997    lumpectomy no lymph    COLONOSCOPY Left 12/14/2018    COLONOSCOPY performed by Didier [facility]amont[facility] at [facility] ENDOSCOPY    HX ORTHOPAEDIC      foot surgery    IR INSERT TUNL CVC W PORT OVER 5 YEARS  4/18/2019      Social History     Tobacco Use    Smoking status: Former Smoker     Packs/day: 1.00     Years: 20.00     Pack years: 20.00     Types: Cigarettes     Last attempt to quit: 04/2004     Years since quitting: 15.3    Smokeless tobacco: Never Used   Substance Use Topics    Alcohol use: Yes     Alcohol/week: 14.0 standard drinks     Types: 14 Glasses of wine per week      Family History   Problem Relation Age of Onset    Breast Cancer Mother         42's 1st time late 52's 2nd time   [place] Cancer Mother         breast CA x2    Hypertension Mother     Heart Disease Father     Cancer Maternal Aunt         breast    Cancer Maternal Aunt         breast    Cancer Maternal Cousin         Breast     Current Outpatient Medications   Medication Sig    OTHER Massage    Dx. C50.511 breast cancer    TURMERIC PO Take 2 Caps by mouth daily.  spironolactone (ALDACTONE) 25 mg tablet Take  by mouth daily.  vit B Cmplx 3-FA-Vit C-Biotin (NEPHRO VINOD RX) 1- mg-mg-mcg tablet Take 1 Tab by mouth daily.  omega 3-dha-epa-fish oil (FISH OIL) 100-160-1,000 mg cap Take 1 Cap by mouth daily.  prochlorperazine (COMPAZINE) 10 mg tablet Take 1 Tab by mouth every six (6) hours as needed for Nausea.  lidocaine-prilocaine (EMLA) topical cream Apply  to affected area as needed for Pain.  ondansetron hcl (ZOFRAN) 8 mg tablet Take 1 Tab by mouth every twelve (12) hours as needed for Nausea.  ALPRAZolam (XANAX) 0.25 mg tablet Take 0.25 mg by mouth. No current facility-administered medications for this visit.       Facility-Administered Medications Ordered in Other Visits   Medication Dose Route Frequency    saline peripheral flush soln 10 mL  10 mL InterCATHeter PRN    sodium chloride 0.9% injection 10 mL  10 mL IntraVENous PRN    heparin (porcine) pf 300-500 Units  300-500 Units InterCATHeter PRN    0.9% sodium chloride infusion  25 mL/hr IntraVENous CONTINUOUS      Allergies   Allergen Reactions    Ancef [Cefazolin] Hives    Penicillins Hives        Review of Systems: A complete review of systems was obtained, negative except as described above. Physical Exam:     Visit Vitals  /58   Pulse 74   Temp 96.3 °F (35.7 °C) (Temporal)   Resp 18   Ht 5' 6\" (1.676 m)   Wt 148 lb 1.6 oz (67.2 kg)   SpO2 97%   BMI 23.90 kg/m²     ECOG PS: 0  General: No distress  Eyes: PERRLA, anicteric sclerae  HENT: Atraumatic, OP clear  Neck: Supple  Lymphatic: No cervical, supraclavicular, or inguinal adenopathy  Respiratory: CTAB, normal respiratory effort  CV: Normal rate, regular rhythm, no murmurs, no peripheral edema  GI: Soft, nontender, nondistended, no masses, no hepatomegaly, no splenomegaly  MS: Normal gait and station. Digits without clubbing or cyanosis. Skin: No rashes, ecchymoses, or petechiae. Normal temperature, turgor, and texture. Abrasion to right shin  Psych: Alert, oriented, appropriate affect, normal judgment/insight        Results:     Lab Results   Component Value Date/Time    WBC 3.7 07/10/2019 09:42 AM    HGB 10.9 (L) 07/10/2019 09:42 AM    HCT 31.1 (L) 07/10/2019 09:42 AM    PLATELET 315 (H) 69/88/8623 09:42 AM    .0 (H) 07/10/2019 09:42 AM    ABS. NEUTROPHILS 1.8 07/10/2019 09:42 AM     Lab Results   Component Value Date/Time    Sodium 138 06/26/2019 09:27 AM    Potassium 3.6 06/26/2019 09:27 AM    Chloride 105 06/26/2019 09:27 AM    CO2 28 06/26/2019 09:27 AM    Glucose 91 06/26/2019 09:27 AM    BUN 10 06/26/2019 09:27 AM    Creatinine 0.73 06/26/2019 09:27 AM    GFR est AA >60 06/26/2019 09:27 AM    GFR est non-AA >60 06/26/2019 09:27 AM    Calcium 9.2 06/26/2019 09:27 AM     Lab Results   Component Value Date/Time    Bilirubin, total 0.7 06/26/2019 09:27 AM    ALT (SGPT) 25 06/26/2019 09:27 AM    AST (SGOT) 22 06/26/2019 09:27 AM    Alk.  phosphatase 38 (L) 06/26/2019 09:27 AM    Protein, total 6.2 (L) 06/26/2019 09:27 AM    Albumin 3.5 06/26/2019 09:27 AM    Globulin 2.7 06/26/2019 09:27 AM         Records reviewed and summarized above. Pathology report(s) reviewed above. Radiology report(s) reviewed above. Assessment/plan:   1. Right LOQ IDC, 1.4 cm, gr 2, 0/2 LN, ER +, UT +, HER 2 POSITIVE:  Stage IA (both anatomic and prognostic). Likely postmenopausal    We explained to the patient that the goal of systemic adjuvant therapy is to improve the chances for cure and decrease the risk of relapse. We explained why a patient can have microscopic cancer spread now even though physical examination, laboratory studies and imaging studies are negative for cancer. We explained that the same treatments used now as adjuvant or preventive treatments rarely if ever are curative in women who develop metastases. Brendon Suarez suggests equivalency between q.3 week Adriamycin, Cytoxan followed by weekly paclitaxel and trastuzumab compared with the [facility] regimen. However, this study was not powered to show a difference between these two regimens, and in the [facility] publication, the AC-TH arm showed a numerically advantange (though not statistically significant) to the [facility] arm. In this patient, it is completely reasonable to use [facility] approach and avoid the potential cardiotoxicity of the anthracyclines as well as the potential for leukemia. We discussed the toxicities of docetaxel and carboplatin chemotherapy in detail. This chemotherapy frequently causes a low white blood cell count and hospital admissions for treatment of neutropenic fever. We explained that we consider the use of growth factors to minimize this risk. We explained to the patient that some side effects if they occur only last a few days including nausea, vomiting, stomatitis, arthralgia, myalgia,and allergic reactions to Taxotere.   We told the patient that severe nausea and vomiting were uncommon and that some side effects,if they occur, will last longer; this includes hair loss, which will be seen in all patients treated with these agents and fatigue,which will be seen in most.  We also informed that for the patient that heart damage is rare with these agents. We explained that carboplatin can rarely cause kidney damage and high frequency hearing loss. We provided the patient in detail her information concerning the toxicities of this regimen in addition to her overall discussion. Rationale for therapy with trastuzumab was also discussed with the patient including a 50% proportional improvement in disease free survival and also an improvement in overall survival in patients receiving trastuzumab and chemotherapy for HER-2 positive breast cancer. The side effects of trastuzumab were discussed including a 4%-5% risk of dropping her ejection fraction while on treatment and about a 1% risk of CHF. We discussed that this drug will be used every 3 weeks for remainder of a year following the chemotherapy cycles. We will check her EF before chemotherapy and every 3 months while she is receiving trastuzumab. · TCH (Trastuzumab 8mg/kg load with cycle 1 then 6mg/kg, Docetaxel 75mg/m2, Carboplatin AUC 6) given every 3 weeks x 6 cycles  · Labs: CBC, CMP prior to each treatment  · Antiemetic Prophylaxis: Palonosetron and dexamethasone prior to chemo  · PRN Antiemetics: Ondansetron, Compazine  · Swelling prophylaxis: Dexmethasone 8mg bid the day before, and day after chemotherapy  · TTE prior to chemotherapy and every 12 weeks while on Trastuzumab  · Neulasta 24-72 hours after each treatment    Also discussed the APT study results, weekly paclitaxel 80 mg/m2 x 12 with weekly trastuzumab (4 mg/kg load then 2 mg/kg)  followed by outback trastuzumab to complete one year. 42% were pT1c. I discussed the potential risks of paclitaxel chemotherapy with the patient. Major toxicities include nausea and vomiting, stomatitis, fatigue.   We provided the patient with detailed information concerning toxicity including frequent toxicities that only last a few days, such as nausea, vomiting, mouth sores, arthralgia, myalgia, and potentially allergic reactions to paclitaxel, as well as toxicities which can be longer lasting including total alopecia, fatigue, anemia and neuropathy. We provided the patient with detailed information concerning the toxicities of their regimen in addition to our verbal discussion. This is a reasonable regimen in this setting (only 4 distant recurrences over 7 years in this study, 7 year DFS was 95%)     After this discussion, she is agreeable to paclitaxel and trastuzumab as in APT, will start on 4/22. She has signed informed consent. TTE on 4/11/19, EF 64%, TTE on 7/9/19, EF 64%. The patient was given the following prescriptions with written and verbal instructions on how to use each:  Compazine, zofran,  a wig, and emla cream.      Discussed cold caps and cryotherapy with paclitaxel. Outback herceptin #1 today. She is schedule to get her port removed this Friday. Plan to continue with SubQ Herceptin. She will not require XRT. Discussed endocrine therapy. The risks and benefits of tamoxifen were discussed in detail and the patient was informed of the following: Risks include a 1% risk of endometrial cancer for postmenopausal women treated for five years but no (or a minimally increased) risk in premenopausal women and that most women who develop tamoxifen-associated endometrial cancer can be cured. Any bleeding in a postmenopausal woman should be reported to a health care professional. There is also a 1% risk of blood clots (thromboembolism) that can be fatal. All patients irrespective of age who take tamoxifen and who have not had a hysterectomy should have a pelvic exam and Pap smear yearly. Tamoxifen increases the risk of cataract formation and on rare occasions has caused retinal damage: an eye exam is recommended yearly.  Other risks include vaginal discharge or dryness, the development or worsening of hot flashes or vasomotor symptoms, and bone loss in premenopausal women. There is excellent evidence that tamoxifen does not increase risk of depression, cause weight gain or have a major effect on sexual function. Available data suggests little or no effect on cognitive function. Benefits include a lowering of cholesterol and a reduction in the rate of bone loss for postmenopausal woman. Any other symptoms should be reported. The risks and benefits of aromatase inhibitors (anastrozole, letrozole, and exemestane) were discussed in detail and the patient was informed of the following: Risks include the development of painful muscles and joints (arthralgia/myalgia) and bone loss. Muscle and joint pain can be severe but rarely result in any tissue damage; symptoms usually resolve in several weeks when the medication is stopped. Bone loss is common and a bone density test is recommended as a baseline and then yearly to every several years depending on initial results. The risk of fractures is increased by a few percent in patients taking these drugs, but careful monitoring of bone density and using bone protecting agents when indicated can minimize these risks. Unlike tamoxifen there is no increased risk of blood clots or endometrial cancer. AIs can cause or worsen vaginal dryness but women using these drugs should not use vaginal estrogen preparations for these symptoms. AIs can also cause or increase hot flashes. Any other symptoms should be reported. Labs reveal she is postmenopausal.     Will get DEXA, ordered. She is agreeable to anastrozole 1 mg daily, rx in. Follow-up after early breast cancer was discussed. I recommend follow-up as defined by the American Society of Clinical Oncology and [facility]. This includes a visit to a health care professional every 3-6 months for 3 years, then every 6-12 months for 2 years, and then yearly as well as mammograms yearly.     2. Emotional well being:  She has excellent support and is coping well with her disease. 3. Diarrhea: Due to chemo. Intermittent. PRN Imodium. Will monitor. 4. Right arm pain:  Ongoing prior to treatment, intermittent but is improving. May be due to surgery. Previously referred to [facility], PT. She has met with Amelia Louis, PT and was given exercises which have helped. Will monitor. 5. Nosebleeds:  Due to chemo. Minor. Recommend nasal saline PRN. Will monitor. 6. Headaches: Resovled; likely due to therapy/antiemetics    7. LE swelling: Ongoing but improves with elevation. Recommend elevation at rest, ambulation, and hydration. Also discussed patient to monitor for s/s of DVT. Will continue to monitor. I appreciate the opportunity to participate in Ms. Mala Moctezuma's care. Signed By: Maribell Nava MD      No orders of the defined types were placed in this encounter.

## 2019-07-18 RX ORDER — ANASTROZOLE 1 MG/1
1 TABLET ORAL DAILY
Qty: 90 TAB | Refills: 3 | Status: SHIPPED | OUTPATIENT
Start: 2019-07-18 | End: 2019-11-27 | Stop reason: SDUPTHER

## 2019-07-19 ENCOUNTER — HOSPITAL ENCOUNTER (OUTPATIENT)
Dept: INTERVENTIONAL RADIOLOGY/VASCULAR | Age: 54
Discharge: HOME | End: 2019-07-19
Attending: INTERNAL MEDICINE | Admitting: INTERNAL MEDICINE
Payer: COMMERCIAL

## 2019-07-19 VITALS
HEART RATE: 68 BPM | SYSTOLIC BLOOD PRESSURE: 122 MMHG | WEIGHT: 149.38 LBS | BODY MASS INDEX: 24.01 KG/M2 | RESPIRATION RATE: 14 BRPM | HEIGHT: 66 IN | OXYGEN SATURATION: 89 % | TEMPERATURE: 98 F | DIASTOLIC BLOOD PRESSURE: 72 MMHG

## 2019-07-19 DIAGNOSIS — Z17.0 MALIGNANT NEOPLASM OF LOWER-OUTER QUADRANT OF RIGHT BREAST OF FEMALE, ESTROGEN RECEPTOR POSITIVE (HCC): ICD-10-CM

## 2019-07-19 DIAGNOSIS — C50.511 MALIGNANT NEOPLASM OF LOWER-OUTER QUADRANT OF RIGHT BREAST OF FEMALE, ESTROGEN RECEPTOR POSITIVE (HCC): ICD-10-CM

## 2019-07-19 PROCEDURE — 74011250636 HC RX REV CODE- 250/636

## 2019-07-19 PROCEDURE — 36590 REMOVAL TUNNELED CV CATH: CPT

## 2019-07-19 PROCEDURE — 77030039266 HC ADH SKN EXOFIN S2SG -A

## 2019-07-19 PROCEDURE — 74011250636 HC RX REV CODE- 250/636: Performed by: RADIOLOGY

## 2019-07-19 PROCEDURE — 77030031139 HC SUT VCRL2 J&J -A

## 2019-07-19 PROCEDURE — 74011000250 HC RX REV CODE- 250: Performed by: RADIOLOGY

## 2019-07-19 PROCEDURE — 99152 MOD SED SAME PHYS/QHP 5/>YRS: CPT

## 2019-07-19 PROCEDURE — 36589 REMOVAL TUNNELED CV CATH: CPT

## 2019-07-19 RX ORDER — LIDOCAINE HYDROCHLORIDE 10 MG/ML
1-20 INJECTION INFILTRATION; PERINEURAL
Status: DISCONTINUED | OUTPATIENT
Start: 2019-07-19 | End: 2019-07-19 | Stop reason: HOSPADM

## 2019-07-19 RX ORDER — CEFAZOLIN SODIUM/WATER 2 G/20 ML
2 SYRINGE (ML) INTRAVENOUS ONCE
Status: DISCONTINUED | OUTPATIENT
Start: 2019-07-19 | End: 2019-07-19

## 2019-07-19 RX ORDER — FENTANYL CITRATE 50 UG/ML
25-200 INJECTION, SOLUTION INTRAMUSCULAR; INTRAVENOUS
Status: DISCONTINUED | OUTPATIENT
Start: 2019-07-19 | End: 2019-07-19 | Stop reason: HOSPADM

## 2019-07-19 RX ORDER — HEPARIN SODIUM 200 [USP'U]/100ML
500 INJECTION, SOLUTION INTRAVENOUS
Status: DISCONTINUED | OUTPATIENT
Start: 2019-07-19 | End: 2019-07-19 | Stop reason: HOSPADM

## 2019-07-19 RX ORDER — MIDAZOLAM HYDROCHLORIDE 1 MG/ML
.5-1 INJECTION, SOLUTION INTRAMUSCULAR; INTRAVENOUS
Status: DISCONTINUED | OUTPATIENT
Start: 2019-07-19 | End: 2019-07-19 | Stop reason: HOSPADM

## 2019-07-19 RX ORDER — LIDOCAINE HYDROCHLORIDE AND EPINEPHRINE 10; 10 MG/ML; UG/ML
.5-4 INJECTION, SOLUTION INFILTRATION; PERINEURAL
Status: DISCONTINUED | OUTPATIENT
Start: 2019-07-19 | End: 2019-07-19 | Stop reason: HOSPADM

## 2019-07-19 RX ADMIN — MIDAZOLAM HYDROCHLORIDE 1 MG: 1 INJECTION, SOLUTION INTRAMUSCULAR; INTRAVENOUS at 12:44

## 2019-07-19 RX ADMIN — LIDOCAINE HYDROCHLORIDE,EPINEPHRINE BITARTRATE 5 MG: 10; .01 INJECTION, SOLUTION INFILTRATION; PERINEURAL at 12:46

## 2019-07-19 RX ADMIN — HEPARIN SODIUM 1000 UNITS: 200 INJECTION, SOLUTION INTRAVENOUS at 12:29

## 2019-07-19 RX ADMIN — FENTANYL CITRATE 25 MCG: 50 INJECTION, SOLUTION INTRAMUSCULAR; INTRAVENOUS at 12:45

## 2019-07-19 RX ADMIN — LIDOCAINE HYDROCHLORIDE 20 ML: 10 INJECTION, SOLUTION INFILTRATION; PERINEURAL at 12:44

## 2019-07-19 RX ADMIN — CEFAZOLIN 0.2 G: 1 INJECTION, POWDER, FOR SOLUTION INTRAMUSCULAR; INTRAVENOUS; PARENTERAL at 12:24

## 2019-07-19 RX ADMIN — MIDAZOLAM HYDROCHLORIDE 1 MG: 1 INJECTION, SOLUTION INTRAMUSCULAR; INTRAVENOUS at 12:46

## 2019-07-19 RX ADMIN — FENTANYL CITRATE 25 MCG: 50 INJECTION, SOLUTION INTRAMUSCULAR; INTRAVENOUS at 12:46

## 2019-07-19 NOTE — PROGRESS NOTES
9: 62 AM      Patient arrived. ID and allergies verified verbally with patient. Pt voices understanding of procedure to be performed. Consent obtained. Pt prepped for procedure. 12:21 PM    TRANSFER - OUT REPORT:    Verbal report given to ELIZABETH Tamayo (name) on Sharmon Wirtz  being transferred to [facility] (unit) for ordered procedure       Report consisted of patients Situation, Background, Assessment and   Recommendations(SBAR). Information from the following report(s) SBAR was reviewed with the receiving nurse. Lines:   Venous Access Device Power Port 04/18/19 Upper chest (subclavicular area, right (Active)       Venous Access Device portacath (Active)       Peripheral IV 07/19/19 Left Antecubital (Active)        Opportunity for questions and clarification was provided. Patient transported with:   Registered Nurse      1:07 PM    TRANSFER - IN REPORT:    Verbal report received from Argentina Lara RN (name) on Sharmon Bliss  being received from [facility] (unit) for routine progression of care      Report consisted of patients Situation, Background, Assessment and   Recommendations(SBAR). Information from the following report(s) Procedure Summary was reviewed with the receiving nurse. Opportunity for questions and clarification was provided. Assessment completed upon patients arrival to unit and care assumed. 1340      Discharge instructions reviewed with patient and family. Voiced understanding. Patient given copy of discharge instructions to take home. 2:14 PM    Pt discharged via wheelchair with Petrona Alexis RN. Personal belongings with patient upon discharge.

## 2019-07-19 NOTE — PROGRESS NOTES
TRANSFER - OUT REPORT:    Verbal report given to bipin(name) on Amelia [facility] being transferred to [facility](unit) for routine post - op       Report consisted of patient's Situation, Background, Assessment and   Recommendations(SBAR). Information from the following report(s) SBAR was reviewed with the receiving nurse. Opportunity for questions and clarification was provided.       Patient transported with:   Registered Nurse

## 2019-07-19 NOTE — H&P
Radiology History and Physical    Patient: Travis Kidney 62 y.o. female       Chief Complaint: No chief complaint on file. History of Present Illness: for port removal    History:    Past Medical History:   Diagnosis Date    Adverse effect of anesthesia 1995    difficulty awakening     Family History   Problem Relation Age of Onset   22 [facility] Breast Cancer Mother         42's 1st time late 52's 2nd time   [address] [facility] [place] Cancer Mother         breast CA x2    Hypertension Mother     Heart Disease Father     Cancer Maternal Aunt         breast    Cancer Maternal Aunt         breast    Cancer Maternal Cousin         Breast     Social History     Socioeconomic History    Marital status: SINGLE     Spouse name: Not on file    Number of children: Not on file    Years of education: Not on file    Highest education level: Not on file   Occupational History    Not on file   Social Needs    Financial resource strain: Not on file    Food insecurity:     Worry: Not on file     Inability: Not on file    Transportation needs:     Medical: Not on file     Non-medical: Not on file   Tobacco Use    Smoking status: Former Smoker     Packs/day: 1.00     Years: 20.00     Pack years: 20.00     Types: Cigarettes     Last attempt to quit: 04/2004     Years since quitting: 15.3    Smokeless tobacco: Never Used   Substance and Sexual Activity    Alcohol use:  Yes     Alcohol/week: 14.0 standard drinks     Types: 14 Glasses of wine per week    Drug use: No    Sexual activity: Not on file   Lifestyle    Physical activity:     Days per week: Not on file     Minutes per session: Not on file    Stress: Not on file   Relationships    Social connections:     Talks on phone: Not on file     Gets together: Not on file     Attends Synagogue service: Not on file     Active member of club or organization: Not on file     Attends meetings of clubs or organizations: Not on file     Relationship status: Not on file    Intimate partner violence: Fear of current or ex partner: Not on file     Emotionally abused: Not on file     Physically abused: Not on file     Forced sexual activity: Not on file   Other Topics Concern    Not on file   Social History Narrative    Not on file       Allergies: Allergies   Allergen Reactions    Ancef [Cefazolin] Hives    Penicillins Hives       Current Medications:  No current facility-administered medications for this encounter. Physical Exam:  Blood pressure 116/62, pulse 75, temperature 98 °F (36.7 °C), resp. rate 11, height 5' 6\" (1.676 m), weight 67.8 kg (149 lb 6 oz), SpO2 99 %. LUNG: clear to auscultation bilaterally, HEART: regular rate and rhythm      Alerts:    Hospital Problems  Date Reviewed: 7/17/2019    None          Laboratory:    No results for input(s): HGB, HCT, WBC, PLT, INR, BUN, CREA, K, CRCLT, HGBEXT, HCTEXT, PLTEXT in the last 72 hours. No lab exists for component: PTT, PT, INREXT      Plan of Care/Planned Procedure:  Risks, benefits, and alternatives reviewed with patient and she agrees to proceed with the procedure.        Gurpreet Keene MD

## 2019-07-19 NOTE — PROGRESS NOTES
TRANSFER - IN REPORT:    Verbal report received from [address] (name) on Brandon Mony  being received from [facility] (unit) for ordered procedure      Report consisted of patients Situation, Background, Assessment and   Recommendations(SBAR). Information from the following report(s) SBAR and Procedure Summary was reviewed with the receiving nurse. Opportunity for questions and clarification was provided. Assessment completed upon patients arrival to unit and care assumed.

## 2019-07-19 NOTE — DISCHARGE INSTRUCTIONS
[address] [facility]  Department of Interventional Radiology  [address] [place] [address] [address] [facility]  [phone] Office  [phone] Fax    DRAIN/PORT/CATHETER REMOVAL  DISCHARGE INSTRUCTIONS    General Information:     Your doctor has ordered for us to remove your port. This could be that you do not need it anymore, it is not doing its job, your physician has decided on another plan for your treatment and/or it may need replacing. Home Care Instructions: You can resume your regular diet and medication regimen. Do not drink alcohol, drive, or make any important legal decisions in the next 24 hours. Do not lift anything heavier than a gallon of milk, or do anything strenuous for the next 24 hours. You will notice skin glue at the site of your incision, this will flake off your skin after a few days. You may cover . Resume your normal level of activity slowly. You may shower after 24 hours, but do not take a bath, swim or immerse yourself in water until the site has healed, and keep the dressing dry with plastic wrap while showering. The site may ooze for a couple of days. This drainage should lessen with each passing day. Call If:     You should call your Physician and/or the Radiology Nurse if you have any bleeding other than a small spot on your bandage. Call if you have any signs of infection, fever, or increased pain at the site of the tube. Call if the oozing increases, if it changes color, or begins to have an odor. Follow-Up Instructions: Please see your ordering doctor as he/she has requested.

## 2019-07-25 ENCOUNTER — TELEPHONE (OUTPATIENT)
Dept: INFUSION THERAPY | Age: 54
End: 2019-07-25

## 2019-07-25 NOTE — TELEPHONE ENCOUNTER
7/25/19 11:10 AM: Returned call to patient and advised that per Marlon Maloney NP, she can come in this afternoon to have her port site assessed. Patient agreed to come in at 2:30 today. Denied further questions or concerns at this time.

## 2019-07-26 RX ORDER — ALBUTEROL SULFATE 0.83 MG/ML
2.5 SOLUTION RESPIRATORY (INHALATION) AS NEEDED
Status: CANCELLED
Start: 2019-08-14

## 2019-07-26 RX ORDER — ONDANSETRON 2 MG/ML
8 INJECTION INTRAMUSCULAR; INTRAVENOUS AS NEEDED
Status: CANCELLED | OUTPATIENT
Start: 2019-08-14

## 2019-07-26 RX ORDER — ACETAMINOPHEN 325 MG/1
650 TABLET ORAL AS NEEDED
Status: CANCELLED
Start: 2019-08-14

## 2019-07-26 RX ORDER — HYDROCORTISONE SODIUM SUCCINATE 100 MG/2ML
100 INJECTION, POWDER, FOR SOLUTION INTRAMUSCULAR; INTRAVENOUS AS NEEDED
Status: CANCELLED | OUTPATIENT
Start: 2019-08-14

## 2019-07-26 RX ORDER — DIPHENHYDRAMINE HYDROCHLORIDE 50 MG/ML
50 INJECTION, SOLUTION INTRAMUSCULAR; INTRAVENOUS AS NEEDED
Status: CANCELLED
Start: 2019-08-14

## 2019-07-26 RX ORDER — EPINEPHRINE 1 MG/ML
0.3 INJECTION, SOLUTION, CONCENTRATE INTRAVENOUS AS NEEDED
Status: CANCELLED | OUTPATIENT
Start: 2019-08-14

## 2019-07-29 ENCOUNTER — TELEPHONE (OUTPATIENT)
Dept: ONCOLOGY | Age: 54
End: 2019-07-29

## 2019-07-29 ENCOUNTER — HOSPITAL ENCOUNTER (OUTPATIENT)
Dept: MAMMOGRAPHY | Age: 54
Discharge: HOME | End: 2019-07-29
Attending: NURSE PRACTITIONER
Payer: COMMERCIAL

## 2019-07-29 ENCOUNTER — PATIENT MESSAGE (OUTPATIENT)
Dept: ONCOLOGY | Age: 54
End: 2019-07-29

## 2019-07-29 DIAGNOSIS — Z17.0 MALIGNANT NEOPLASM OF LOWER-OUTER QUADRANT OF RIGHT BREAST OF FEMALE, ESTROGEN RECEPTOR POSITIVE (HCC): ICD-10-CM

## 2019-07-29 DIAGNOSIS — Z78.0 POSTMENOPAUSAL: ICD-10-CM

## 2019-07-29 DIAGNOSIS — C50.511 MALIGNANT NEOPLASM OF LOWER-OUTER QUADRANT OF RIGHT BREAST OF FEMALE, ESTROGEN RECEPTOR POSITIVE (HCC): ICD-10-CM

## 2019-07-29 PROCEDURE — 77080 DXA BONE DENSITY AXIAL: CPT

## 2019-07-29 NOTE — TELEPHONE ENCOUNTER
See [employer] message from 7/29/19.    ----- Message from Sly Alanis NP sent at 7/29/2019  4:58 PM EDT -----  Can you let her know her bone density is normal.  Thanks!

## 2019-08-07 ENCOUNTER — APPOINTMENT (OUTPATIENT)
Dept: INFUSION THERAPY | Age: 54
End: 2019-08-07
Payer: COMMERCIAL

## 2019-08-14 ENCOUNTER — HOSPITAL ENCOUNTER (OUTPATIENT)
Dept: INFUSION THERAPY | Age: 54
Discharge: HOME | End: 2019-08-14
Payer: COMMERCIAL

## 2019-08-14 ENCOUNTER — OFFICE VISIT (OUTPATIENT)
Dept: ONCOLOGY | Age: 54
End: 2019-08-14

## 2019-08-14 ENCOUNTER — TELEPHONE (OUTPATIENT)
Dept: ONCOLOGY | Age: 54
End: 2019-08-14

## 2019-08-14 VITALS
OXYGEN SATURATION: 97 % | SYSTOLIC BLOOD PRESSURE: 118 MMHG | DIASTOLIC BLOOD PRESSURE: 63 MMHG | TEMPERATURE: 98.1 F | WEIGHT: 142 LBS | HEIGHT: 66 IN | HEART RATE: 87 BPM | BODY MASS INDEX: 22.82 KG/M2 | RESPIRATION RATE: 14 BRPM

## 2019-08-14 VITALS
DIASTOLIC BLOOD PRESSURE: 63 MMHG | HEART RATE: 87 BPM | TEMPERATURE: 98.1 F | SYSTOLIC BLOOD PRESSURE: 118 MMHG | RESPIRATION RATE: 16 BRPM

## 2019-08-14 DIAGNOSIS — Z17.0 MALIGNANT NEOPLASM OF LOWER-OUTER QUADRANT OF RIGHT BREAST OF FEMALE, ESTROGEN RECEPTOR POSITIVE (HCC): Primary | ICD-10-CM

## 2019-08-14 DIAGNOSIS — Z17.0 MALIGNANT NEOPLASM OF RIGHT BREAST IN FEMALE, ESTROGEN RECEPTOR POSITIVE, UNSPECIFIED SITE OF BREAST (HCC): Primary | ICD-10-CM

## 2019-08-14 DIAGNOSIS — Z51.81 ENCOUNTER FOR MONITORING CARDIOTOXIC DRUG THERAPY: ICD-10-CM

## 2019-08-14 DIAGNOSIS — Z78.0 POSTMENOPAUSAL: ICD-10-CM

## 2019-08-14 DIAGNOSIS — M79.601 PAIN OF RIGHT UPPER EXTREMITY: ICD-10-CM

## 2019-08-14 DIAGNOSIS — C50.911 MALIGNANT NEOPLASM OF RIGHT BREAST IN FEMALE, ESTROGEN RECEPTOR POSITIVE, UNSPECIFIED SITE OF BREAST (HCC): Primary | ICD-10-CM

## 2019-08-14 DIAGNOSIS — C50.511 MALIGNANT NEOPLASM OF LOWER-OUTER QUADRANT OF RIGHT BREAST OF FEMALE, ESTROGEN RECEPTOR POSITIVE (HCC): Primary | ICD-10-CM

## 2019-08-14 DIAGNOSIS — R19.7 DIARRHEA, UNSPECIFIED TYPE: ICD-10-CM

## 2019-08-14 DIAGNOSIS — Z79.899 ENCOUNTER FOR MONITORING CARDIOTOXIC DRUG THERAPY: ICD-10-CM

## 2019-08-14 DIAGNOSIS — Z51.11 ENCOUNTER FOR ANTINEOPLASTIC CHEMOTHERAPY: ICD-10-CM

## 2019-08-14 PROCEDURE — 74011250636 HC RX REV CODE- 250/636: Performed by: NURSE PRACTITIONER

## 2019-08-14 PROCEDURE — 96401 CHEMO ANTI-NEOPL SQ/IM: CPT

## 2019-08-14 RX ORDER — ASCORBIC ACID 500 MG
1000 TABLET ORAL DAILY
COMMUNITY

## 2019-08-14 RX ADMIN — TRASTUZUMAB AND HYALURONIDASE-OYSK 600 MG: 600; 10000 INJECTION, SOLUTION SUBCUTANEOUS at 15:43

## 2019-08-14 NOTE — PROGRESS NOTES
[facility] at [address], [address] [facility] [address] [facility] [place]: [phone]  F: [phone]      Reason for Visit:   Travis Simental is a 62 y.o. female who is seen in consultation at the request of Dr. Jose Angel Moran for evaluation of systemic therapy for breast cancer. Consulting physician:  Dr. 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 Interval history:  In today for follow up. Complains of gr 1 loss of appetite, gr 1 bleeding, gr 1 diarrhea, gr 1 fatigue, gr 1 hair loss, gr 1 hot flashes, gr 1 insomnia, gr 1 cognition/concentration, gr 1 anxiety, gr 1 pain 2/10 to shoulder, gr 1 neuropathy, gr 1 swelling.      FH:  Mother with breast cancer at age 45s and 46s; maternal aunt with breast cancer in her 46s; maternal aunt with breast cancer in her 46s; no ovarian, prostate, pancreas cancer    LMP 11/2018, spotty prior to that    Past Medical History:   Diagnosis Date    Adverse effect of anesthesia 1995    difficulty awakening      Past Surgical History:   Procedure Laterality Date    BREAST SURGERY PROCEDURE UNLISTED Left 1997    lumpectomy no lymph    COLONOSCOPY Left 12/14/2018    COLONOSCOPY performed by Darlene [facility] at [facility] ENDOSCOPY    HX ORTHOPAEDIC      foot surgery    IR INSERT TUNL CVC W PORT OVER 5 YEARS  4/18/2019    IR REMOVE MERVAT CVAD W/O PORT / PUMP  7/19/2019      Social History     Tobacco Use    Smoking status: Former Smoker     Packs/day: 1.00     Years: 20.00     Pack years: 20.00     Types: Cigarettes     Last attempt to quit: 04/2004     Years since quitting: 15.3    Smokeless tobacco: Never Used   Substance Use Topics    Alcohol use: Yes     Alcohol/week: 14.0 standard drinks     Types: 14 Glasses of wine per week      Family History   Problem Relation Age of Onset    Breast Cancer Mother         42's 1st time late 52's 2nd time   Khanh.Hatter Cancer Mother         breast CA x2    Hypertension Mother     Heart Disease Father     Cancer Maternal Aunt         breast    Cancer Maternal Aunt         breast    Cancer Maternal Cousin         Breast     Current Outpatient Medications   Medication Sig    ascorbic acid, vitamin C, (VITAMIN C) 500 mg tablet Take 1,000 mg by mouth.  anastrozole (ARIMIDEX) 1 mg tablet Take 1 mg by mouth daily.  TURMERIC PO Take 2 Caps by mouth daily.  ALPRAZolam (XANAX) 0.25 mg tablet Take 0.25 mg by mouth.  spironolactone (ALDACTONE) 25 mg tablet Take  by mouth daily.  vit B Cmplx 3-FA-Vit C-Biotin (NEPHRO VINOD RX) 1- mg-mg-mcg tablet Take 1 Tab by mouth daily.  omega 3-dha-epa-fish oil (FISH OIL) 100-160-1,000 mg cap Take 1 Cap by mouth daily. No current facility-administered medications for this visit. Allergies   Allergen Reactions    Ancef [Cefazolin] Hives    Penicillins Hives        Review of Systems: A complete review of systems was obtained, negative except as described above.     Physical Exam:     Visit Vitals  /63 (BP 1 Location: Left arm, BP Patient Position: Sitting)   Pulse 87   Temp 98.1 °F (36.7 °C) (Oral)   Resp 14   Ht 5' 6\" (1.676 m)   Wt 142 lb (64.4 kg)   SpO2 97%   BMI 22.92 kg/m²     ECOG PS: 0  General: No distress  Eyes: PERRLA, anicteric sclerae  HENT: Atraumatic, OP clear  Neck: Supple  Lymphatic: No cervical, supraclavicular, or inguinal adenopathy  Respiratory: CTAB, normal respiratory effort  CV: Normal rate, regular rhythm, no murmurs, no peripheral edema  GI: Soft, nontender, nondistended, no masses, no hepatomegaly, no splenomegaly  MS: Normal gait and station. Digits without clubbing or cyanosis. Skin: No rashes, ecchymoses, or petechiae. Normal temperature, turgor, and texture. Psych: Alert, oriented, appropriate affect, normal judgment/insight        Results:     Lab Results   Component Value Date/Time    WBC 3.7 07/10/2019 09:42 AM    HGB 10.9 (L) 07/10/2019 09:42 AM    HCT 31.1 (L) 07/10/2019 09:42 AM    PLATELET 198 (H) 90/79/4113 09:42 AM    .0 (H) 07/10/2019 09:42 AM    ABS. NEUTROPHILS 1.8 07/10/2019 09:42 AM     Lab Results   Component Value Date/Time    Sodium 138 06/26/2019 09:27 AM    Potassium 3.6 06/26/2019 09:27 AM    Chloride 105 06/26/2019 09:27 AM    CO2 28 06/26/2019 09:27 AM    Glucose 91 06/26/2019 09:27 AM    BUN 10 06/26/2019 09:27 AM    Creatinine 0.73 06/26/2019 09:27 AM    GFR est AA >60 06/26/2019 09:27 AM    GFR est non-AA >60 06/26/2019 09:27 AM    Calcium 9.2 06/26/2019 09:27 AM     Lab Results   Component Value Date/Time    Bilirubin, total 0.7 06/26/2019 09:27 AM    ALT (SGPT) 25 06/26/2019 09:27 AM    AST (SGOT) 22 06/26/2019 09:27 AM    Alk. phosphatase 38 (L) 06/26/2019 09:27 AM    Protein, total 6.2 (L) 06/26/2019 09:27 AM    Albumin 3.5 06/26/2019 09:27 AM    Globulin 2.7 06/26/2019 09:27 AM         Records reviewed and summarized above. Pathology report(s) reviewed above. Radiology report(s) reviewed above. Assessment/plan:   1. Right LOQ IDC, 1.4 cm, gr 2, 0/2 LN, ER +, OR +, HER 2 POSITIVE:  Stage IA (both anatomic and prognostic). Likely postmenopausal    We explained to the patient that the goal of systemic adjuvant therapy is to improve the chances for cure and decrease the risk of relapse.  We explained why a patient can have microscopic cancer spread now even though physical examination, laboratory studies and imaging studies are negative for cancer. We explained that the same treatments used now as adjuvant or preventive treatments rarely if ever are curative in women who develop metastases. Mily Osborn suggests equivalency between q.3 week Adriamycin, Cytoxan followed by weekly paclitaxel and trastuzumab compared with the [facility] regimen. However, this study was not powered to show a difference between these two regimens, and in the [facility] publication, the AC-TH arm showed a numerically advantange (though not statistically significant) to the [facility] arm. In this patient, it is completely reasonable to use [facility] approach and avoid the potential cardiotoxicity of the anthracyclines as well as the potential for leukemia. We discussed the toxicities of docetaxel and carboplatin chemotherapy in detail. This chemotherapy frequently causes a low white blood cell count and hospital admissions for treatment of neutropenic fever. We explained that we consider the use of growth factors to minimize this risk. We explained to the patient that some side effects if they occur only last a few days including nausea, vomiting, stomatitis, arthralgia, myalgia,and allergic reactions to Taxotere. We told the patient that severe nausea and vomiting were uncommon and that some side effects,if they occur, will last longer; this includes hair loss, which will be seen in all patients treated with these agents and fatigue,which will be seen in most.  We also informed that for the patient that heart damage is rare with these agents. We explained that carboplatin can rarely cause kidney damage and high frequency hearing loss. We provided the patient in detail her information concerning the toxicities of this regimen in addition to her overall discussion.       Rationale for therapy with trastuzumab was also discussed with the patient including a 50% proportional improvement in disease free survival and also an improvement in overall survival in patients receiving trastuzumab and chemotherapy for HER-2 positive breast cancer. The side effects of trastuzumab were discussed including a 4%-5% risk of dropping her ejection fraction while on treatment and about a 1% risk of CHF. We discussed that this drug will be used every 3 weeks for remainder of a year following the chemotherapy cycles. We will check her EF before chemotherapy and every 3 months while she is receiving trastuzumab. · TCH (Trastuzumab 8mg/kg load with cycle 1 then 6mg/kg, Docetaxel 75mg/m2, Carboplatin AUC 6) given every 3 weeks x 6 cycles  · Labs: CBC, CMP prior to each treatment  · Antiemetic Prophylaxis: Palonosetron and dexamethasone prior to chemo  · PRN Antiemetics: Ondansetron, Compazine  · Swelling prophylaxis: Dexmethasone 8mg bid the day before, and day after chemotherapy  · TTE prior to chemotherapy and every 12 weeks while on Trastuzumab  · Neulasta 24-72 hours after each treatment    Also discussed the APT study results, weekly paclitaxel 80 mg/m2 x 12 with weekly trastuzumab (4 mg/kg load then 2 mg/kg)  followed by outback trastuzumab to complete one year. 42% were pT1c. I discussed the potential risks of paclitaxel chemotherapy with the patient. Major toxicities include nausea and vomiting, stomatitis, fatigue. We provided the patient with detailed information concerning toxicity including frequent toxicities that only last a few days, such as nausea, vomiting, mouth sores, arthralgia, myalgia, and potentially allergic reactions to paclitaxel, as well as toxicities which can be longer lasting including total alopecia, fatigue, anemia and neuropathy. We provided the patient with detailed information concerning the toxicities of their regimen in addition to our verbal discussion.     This is a reasonable regimen in this setting (only 4 distant recurrences over 7 years in this study, 7 year DFS was 95%)     After this discussion, she is agreeable to paclitaxel and trastuzumab as in APT, will start on 4/22. She has signed informed consent. TTE on 4/11/19, EF 64%, TTE on 7/9/19, EF 64%, net due 10/1/19. The patient was given the following prescriptions with written and verbal instructions on how to use each:  Compazine, zofran,  a wig, and emla cream.      Discussed cold caps and cryotherapy with paclitaxel. Outback herceptin #2 today. Port has been removed. Plan to continue with SubQ Herceptin. She will not require XRT. Labs reveal she is postmenopausal.     DEXA on 7/29/19 normal.  She is areeable to anastrozole 1 mg daily, rx in. Started on 8/1/19. Follow-up after early breast cancer was discussed. I recommend follow-up as defined by the American Society of Clinical Oncology and [facility]. This includes a visit to a health care professional every 3-6 months for 3 years, then every 6-12 months for 2 years, and then yearly as well as mammograms yearly. 2. Emotional well being:  She has excellent support and is coping well with her disease. 3. Diarrhea: Due to chemo. Intermittent. PRN Imodium. Will monitor. 4. Right arm pain:  Ongoing prior to treatment, intermittent but is improving. May be due to surgery. Previously referred to [facility], PT. She has met with Ember Carrasco PT and was given exercises which have helped. Will monitor. 5. Nosebleeds:  Due to chemo. Minor, but improved. Recommend nasal saline PRN. Will monitor. 6. Headaches: Resovled; likely due to therapy/antiemetics    7. LE swelling: Ongoing but improves with elevation. Also improved off chemo. Recommend elevation at rest, ambulation, and hydration. Also discussed patient to monitor for s/s of DVT. Will continue to monitor. 8.  Neuropathy:  Mild and intermittent. Will monitor. I appreciate the opportunity to participate in Ms. Pennie Moctezuma's care.     Signed By: Chuck Bailey MD No orders of the defined types were placed in this encounter.

## 2019-08-14 NOTE — PROGRESS NOTES
[facility] Progress Note    Date: 2019    Name: Dylan Gleason    MRN: [id number]         : 1965    Ms. Patricia De Luna Arrived ambulatory and in no distress for C6D1 of Herceptin Regimen. Assessment was completed, no acute issues at this time, no new complaints voiced. Echo reviewed from 19. EF 64% and within treatment parameters. [facility] Ms. Moctezuma's vitals were reviewed. Patient Vitals for the past 12 hrs:   Temp Pulse Resp BP   19 1540 98.1 °F (36.7 °C) 87 16 118/63       Medications:  Medications Administered     trastuzumab-hyaluronidase-oysk (HERCEPTIN HYLECTA) injection 600 mg     Admin Date  2019 Action  Given Dose  600 mg Route  SubCUTAneous Administered By  Isidra Adams RN              Right thigh SQ  Pt observed for 15 minutes after injection. Pt states she feels comfortable leaving. Ms. Patricia De Luna tolerated treatment well and was discharged from [place] [id number] in stable condition. She is to return on 19 at 9:00 for her next appointment.     Rishi Hernandez RN  2019

## 2019-08-14 NOTE — PROGRESS NOTES
Chief Complaint   Patient presents with    Breast Problem     right breast cancer     1. Have you been to the ER, urgent care clinic since your last visit? Hospitalized since your last visit? No    2. Have you seen or consulted any other health care providers outside of the [address] [place] [address] since your last visit? Include any pap smears or colon screening.  No   Dr. Jasbir Camilo Maintenance Due   Topic Date Due    Hepatitis C Screening  1965    DTaP/Tdap/Td series (1 - Tdap) 07/09/1984    PAP AKA CERVICAL CYTOLOGY  07/09/1984    Shingrix Vaccine Age 50> (1 of 2) 07/09/2013    FOBT Q 1 YEAR AGE 50-75  07/09/2013    Influenza Age 9 to Adult  08/01/2019

## 2019-08-28 ENCOUNTER — APPOINTMENT (OUTPATIENT)
Dept: INFUSION THERAPY | Age: 54
End: 2019-08-28
Payer: COMMERCIAL

## 2019-09-04 ENCOUNTER — HOSPITAL ENCOUNTER (OUTPATIENT)
Dept: INFUSION THERAPY | Age: 54
Discharge: HOME | End: 2019-09-04
Payer: COMMERCIAL

## 2019-09-04 VITALS
SYSTOLIC BLOOD PRESSURE: 121 MMHG | RESPIRATION RATE: 18 BRPM | WEIGHT: 139.2 LBS | BODY MASS INDEX: 22.47 KG/M2 | OXYGEN SATURATION: 99 % | DIASTOLIC BLOOD PRESSURE: 74 MMHG | HEART RATE: 77 BPM | TEMPERATURE: 98.3 F

## 2019-09-04 DIAGNOSIS — M79.5 FOREIGN BODY (FB) IN SOFT TISSUE: ICD-10-CM

## 2019-09-04 DIAGNOSIS — C50.911 MALIGNANT NEOPLASM OF RIGHT BREAST IN FEMALE, ESTROGEN RECEPTOR POSITIVE, UNSPECIFIED SITE OF BREAST (HCC): Primary | ICD-10-CM

## 2019-09-04 DIAGNOSIS — Z17.0 MALIGNANT NEOPLASM OF LOWER-OUTER QUADRANT OF RIGHT BREAST OF FEMALE, ESTROGEN RECEPTOR POSITIVE (HCC): ICD-10-CM

## 2019-09-04 DIAGNOSIS — Z17.0 MALIGNANT NEOPLASM OF RIGHT BREAST IN FEMALE, ESTROGEN RECEPTOR POSITIVE, UNSPECIFIED SITE OF BREAST (HCC): Primary | ICD-10-CM

## 2019-09-04 DIAGNOSIS — Z95.828 PORT-A-CATH IN PLACE: Primary | ICD-10-CM

## 2019-09-04 DIAGNOSIS — C50.511 MALIGNANT NEOPLASM OF LOWER-OUTER QUADRANT OF RIGHT BREAST OF FEMALE, ESTROGEN RECEPTOR POSITIVE (HCC): ICD-10-CM

## 2019-09-04 PROCEDURE — 74011250636 HC RX REV CODE- 250/636: Performed by: NURSE PRACTITIONER

## 2019-09-04 PROCEDURE — 96401 CHEMO ANTI-NEOPL SQ/IM: CPT

## 2019-09-04 RX ADMIN — TRASTUZUMAB AND HYALURONIDASE-OYSK 600 MG: 600; 10000 INJECTION, SOLUTION SUBCUTANEOUS at 09:33

## 2019-09-04 NOTE — PROGRESS NOTES
[facility] Progress Note    Date: 2019    Name: Nereida Bailey    MRN: [id number]         : 1965    Ms. Demario Springer Arrived ambulatory and in no distress for C7D1 of Regimen. Assessment was completed, no acute issues at this time, no new complaints voiced. Patient tolerated previous treatment without issues  Chemotherapy Flowsheet 2019   Cycle C7D1   Date 2019   Drug / Regimen Herceptin hylecta   Pre Meds -   Notes -         Ms. Moctezuma's vitals were reviewed. Visit Vitals  /74   Pulse 77   Temp 98.3 °F (36.8 °C)   Resp 18   Wt 63.1 kg (139 lb 3.2 oz)   SpO2 99%   BMI 22.47 kg/m²         Medications:  herceptin hylecta SQ L thigh    Ms. Moctezuma tolerated treatment well and was discharged from [place] [address] in stable condition. She is to return on  for her next appointment.     Rosalba Barber RN  2019

## 2019-09-12 DIAGNOSIS — Z12.31 SCREENING MAMMOGRAM, ENCOUNTER FOR: Primary | ICD-10-CM

## 2019-09-13 ENCOUNTER — HOSPITAL ENCOUNTER (OUTPATIENT)
Dept: LAB | Age: 54
Discharge: HOME | End: 2019-09-13

## 2019-09-18 ENCOUNTER — APPOINTMENT (OUTPATIENT)
Dept: INFUSION THERAPY | Age: 54
End: 2019-09-18
Payer: COMMERCIAL

## 2019-09-19 RX ORDER — HYDROCORTISONE SODIUM SUCCINATE 100 MG/2ML
100 INJECTION, POWDER, FOR SOLUTION INTRAMUSCULAR; INTRAVENOUS AS NEEDED
Status: CANCELLED | OUTPATIENT
Start: 2019-11-06

## 2019-09-19 RX ORDER — ALBUTEROL SULFATE 0.83 MG/ML
2.5 SOLUTION RESPIRATORY (INHALATION) AS NEEDED
Status: CANCELLED
Start: 2019-11-06

## 2019-09-19 RX ORDER — HYDROCORTISONE SODIUM SUCCINATE 100 MG/2ML
100 INJECTION, POWDER, FOR SOLUTION INTRAMUSCULAR; INTRAVENOUS AS NEEDED
Status: CANCELLED | OUTPATIENT
Start: 2019-10-17

## 2019-09-19 RX ORDER — ONDANSETRON 2 MG/ML
8 INJECTION INTRAMUSCULAR; INTRAVENOUS AS NEEDED
Status: CANCELLED | OUTPATIENT
Start: 2019-10-17

## 2019-09-19 RX ORDER — ACETAMINOPHEN 325 MG/1
650 TABLET ORAL AS NEEDED
Status: CANCELLED
Start: 2019-11-27

## 2019-09-19 RX ORDER — DIPHENHYDRAMINE HYDROCHLORIDE 50 MG/ML
50 INJECTION, SOLUTION INTRAMUSCULAR; INTRAVENOUS AS NEEDED
Status: CANCELLED
Start: 2019-10-17

## 2019-09-19 RX ORDER — HYDROCORTISONE SODIUM SUCCINATE 100 MG/2ML
100 INJECTION, POWDER, FOR SOLUTION INTRAMUSCULAR; INTRAVENOUS AS NEEDED
Status: CANCELLED | OUTPATIENT
Start: 2019-11-27

## 2019-09-19 RX ORDER — ACETAMINOPHEN 325 MG/1
650 TABLET ORAL AS NEEDED
Status: CANCELLED
Start: 2019-10-17

## 2019-09-19 RX ORDER — ALBUTEROL SULFATE 0.83 MG/ML
2.5 SOLUTION RESPIRATORY (INHALATION) AS NEEDED
Status: CANCELLED
Start: 2019-11-27

## 2019-09-19 RX ORDER — ALBUTEROL SULFATE 0.83 MG/ML
2.5 SOLUTION RESPIRATORY (INHALATION) AS NEEDED
Status: CANCELLED
Start: 2019-10-17

## 2019-09-19 RX ORDER — EPINEPHRINE 1 MG/ML
0.3 INJECTION, SOLUTION, CONCENTRATE INTRAVENOUS AS NEEDED
Status: CANCELLED | OUTPATIENT
Start: 2019-11-06

## 2019-09-19 RX ORDER — ONDANSETRON 2 MG/ML
8 INJECTION INTRAMUSCULAR; INTRAVENOUS AS NEEDED
Status: CANCELLED | OUTPATIENT
Start: 2019-11-06

## 2019-09-19 RX ORDER — DIPHENHYDRAMINE HYDROCHLORIDE 50 MG/ML
50 INJECTION, SOLUTION INTRAMUSCULAR; INTRAVENOUS AS NEEDED
Status: CANCELLED
Start: 2019-11-06

## 2019-09-19 RX ORDER — DIPHENHYDRAMINE HYDROCHLORIDE 50 MG/ML
50 INJECTION, SOLUTION INTRAMUSCULAR; INTRAVENOUS AS NEEDED
Status: CANCELLED
Start: 2019-11-27

## 2019-09-19 RX ORDER — ACETAMINOPHEN 325 MG/1
650 TABLET ORAL AS NEEDED
Status: CANCELLED
Start: 2019-11-06

## 2019-09-19 RX ORDER — EPINEPHRINE 1 MG/ML
0.3 INJECTION, SOLUTION, CONCENTRATE INTRAVENOUS AS NEEDED
Status: CANCELLED | OUTPATIENT
Start: 2019-11-27

## 2019-09-19 RX ORDER — ONDANSETRON 2 MG/ML
8 INJECTION INTRAMUSCULAR; INTRAVENOUS AS NEEDED
Status: CANCELLED | OUTPATIENT
Start: 2019-11-27

## 2019-09-19 RX ORDER — EPINEPHRINE 1 MG/ML
0.3 INJECTION, SOLUTION, CONCENTRATE INTRAVENOUS AS NEEDED
Status: CANCELLED | OUTPATIENT
Start: 2019-10-17

## 2019-09-25 ENCOUNTER — HOSPITAL ENCOUNTER (OUTPATIENT)
Dept: GENERAL RADIOLOGY | Age: 54
Discharge: HOME | End: 2019-09-25
Payer: COMMERCIAL

## 2019-09-25 ENCOUNTER — OFFICE VISIT (OUTPATIENT)
Dept: ONCOLOGY | Age: 54
End: 2019-09-25

## 2019-09-25 ENCOUNTER — HOSPITAL ENCOUNTER (OUTPATIENT)
Dept: INFUSION THERAPY | Age: 54
Discharge: HOME | End: 2019-09-25
Payer: COMMERCIAL

## 2019-09-25 VITALS
HEART RATE: 74 BPM | OXYGEN SATURATION: 99 % | WEIGHT: 141 LBS | SYSTOLIC BLOOD PRESSURE: 123 MMHG | DIASTOLIC BLOOD PRESSURE: 59 MMHG | HEIGHT: 66 IN | RESPIRATION RATE: 18 BRPM | TEMPERATURE: 98.2 F | BODY MASS INDEX: 22.66 KG/M2

## 2019-09-25 VITALS
OXYGEN SATURATION: 99 % | DIASTOLIC BLOOD PRESSURE: 59 MMHG | TEMPERATURE: 98.2 F | SYSTOLIC BLOOD PRESSURE: 123 MMHG | HEART RATE: 74 BPM | RESPIRATION RATE: 16 BRPM | BODY MASS INDEX: 22.66 KG/M2 | WEIGHT: 141 LBS | HEIGHT: 66 IN

## 2019-09-25 DIAGNOSIS — C50.511 MALIGNANT NEOPLASM OF LOWER-OUTER QUADRANT OF RIGHT BREAST OF FEMALE, ESTROGEN RECEPTOR POSITIVE (HCC): Primary | ICD-10-CM

## 2019-09-25 DIAGNOSIS — Z78.0 POSTMENOPAUSAL: ICD-10-CM

## 2019-09-25 DIAGNOSIS — C50.511 MALIGNANT NEOPLASM OF LOWER-OUTER QUADRANT OF RIGHT BREAST OF FEMALE, ESTROGEN RECEPTOR POSITIVE (HCC): ICD-10-CM

## 2019-09-25 DIAGNOSIS — Z79.899 ENCOUNTER FOR MONITORING CARDIOTOXIC DRUG THERAPY: ICD-10-CM

## 2019-09-25 DIAGNOSIS — C50.911 MALIGNANT NEOPLASM OF RIGHT BREAST IN FEMALE, ESTROGEN RECEPTOR POSITIVE, UNSPECIFIED SITE OF BREAST (HCC): Primary | ICD-10-CM

## 2019-09-25 DIAGNOSIS — Z17.0 MALIGNANT NEOPLASM OF LOWER-OUTER QUADRANT OF RIGHT BREAST OF FEMALE, ESTROGEN RECEPTOR POSITIVE (HCC): ICD-10-CM

## 2019-09-25 DIAGNOSIS — Z17.0 MALIGNANT NEOPLASM OF LOWER-OUTER QUADRANT OF RIGHT BREAST OF FEMALE, ESTROGEN RECEPTOR POSITIVE (HCC): Primary | ICD-10-CM

## 2019-09-25 DIAGNOSIS — Z17.0 MALIGNANT NEOPLASM OF RIGHT BREAST IN FEMALE, ESTROGEN RECEPTOR POSITIVE, UNSPECIFIED SITE OF BREAST (HCC): Primary | ICD-10-CM

## 2019-09-25 DIAGNOSIS — M79.601 PAIN OF RIGHT UPPER EXTREMITY: ICD-10-CM

## 2019-09-25 DIAGNOSIS — Z51.81 ENCOUNTER FOR MONITORING CARDIOTOXIC DRUG THERAPY: ICD-10-CM

## 2019-09-25 PROCEDURE — 96401 CHEMO ANTI-NEOPL SQ/IM: CPT

## 2019-09-25 PROCEDURE — 74011250636 HC RX REV CODE- 250/636: Performed by: NURSE PRACTITIONER

## 2019-09-25 PROCEDURE — 73030 X-RAY EXAM OF SHOULDER: CPT

## 2019-09-25 RX ORDER — DULOXETIN HYDROCHLORIDE 30 MG/1
30 CAPSULE, DELAYED RELEASE ORAL DAILY
Qty: 30 CAP | Refills: 1 | Status: SHIPPED | OUTPATIENT
Start: 2019-09-25 | End: 2019-12-18

## 2019-09-25 RX ADMIN — TRASTUZUMAB AND HYALURONIDASE-OYSK 600 MG: 600; 10000 INJECTION, SOLUTION SUBCUTANEOUS at 09:53

## 2019-09-25 NOTE — PROGRESS NOTES
[facility]  [address] [facility], [address] [phone]  F: [phone]      Reason for Visit:   Ting Vaughan is a 62 y.o. female who is seen in consultation at the request of Dr. Lucía Olivo for evaluation of systemic therapy for breast cancer. Consulting physician:  Dr. Shira Holguin    Treatment History:   · 12/10/18 right breast US bx:  IMC, gr 1, 0.12 cm (1.2 mm); insufficient for receptors  · 19 right breast core bx:  11:00 lobular intraepithelial neoplasia; right breast core bx 7:00:  DCIS, gr 2-3, cribriform, 1.4 cm, ER + at 94%, NH + at 54%  · 19 right mastectomy : LOQ IDC, 1.4 cm, ER + at 89%, NH + at 78%, HER 2 POSITIVE (IHC 2+, FISH ratio 3.3; sig/cell 9.2) ; ki67 22%, gr 2, DCIS present and extensive, 0/2 LN; pT1c pN0 cM0  · Myriad Myrisk negative  · TH 19-7/10/19  · Outback Herceptin 19-  · Anastrozole-19-      History of Present Illness: An abnormal mammogram 2018 led to the pathology above. Interval history:  In today for follow up. Complains of gr 1 diarrhea, gr 3 hair loss, gr 1 hot flashes, gr 1 insomnia, gr 1 cognition, gr 1 concentration, 3/10 pain to shoulder.     FH:  Mother with breast cancer at age 45s and 46s; maternal aunt with breast cancer in her 46s; maternal aunt with breast cancer in her 46s; no ovarian, prostate, pancreas cancer    LMP 2018, spotty prior to that    Past Medical History:   Diagnosis Date    Adverse effect of anesthesia     difficulty awakening      Past Surgical History:   Procedure Laterality Date    BREAST SURGERY PROCEDURE UNLISTED Left     lumpectomy no lymph    COLONOSCOPY Left 2018    COLONOSCOPY performed by Jena [facility] at [facility] ENDOSCOPY    HX ORTHOPAEDIC      foot surgery    IR INSERT TUNL CVC W PORT OVER 5 YEARS  2019    IR REMOVE TUNL CVAD W/O PORT / PUMP  2019      Social History     Tobacco Use    Smoking status: Former Smoker     Packs/day: 1.00     Years: 20.00     Pack years: 20.00     Types: Cigarettes     Last attempt to quit: 04/2004     Years since quitting: 15.4    Smokeless tobacco: Never Used   Substance Use Topics    Alcohol use: Yes     Alcohol/week: 14.0 standard drinks     Types: 14 Glasses of wine per week      Family History   Problem Relation Age of Onset    Breast Cancer Mother         42's 1st time late 52's 2nd time   [place] Cancer Mother         breast CA x2    Hypertension Mother     Heart Disease Father     Cancer Maternal Aunt         breast    Cancer Maternal Aunt         breast    Cancer Maternal Cousin         Breast     Current Outpatient Medications   Medication Sig    COLLAGEN by Does Not Apply route daily.  DULoxetine (CYMBALTA) 30 mg capsule Take 1 Cap by mouth daily.  ascorbic acid, vitamin C, (VITAMIN C) 500 mg tablet Take 1,000 mg by mouth daily.  anastrozole (ARIMIDEX) 1 mg tablet Take 1 mg by mouth daily.  TURMERIC PO Take 2 Caps by mouth daily.  spironolactone (ALDACTONE) 25 mg tablet Take  by mouth daily.  vit B Cmplx 3-FA-Vit C-Biotin (NEPHRO VINOD RX) 1- mg-mg-mcg tablet Take 1 Tab by mouth daily.  ALPRAZolam (XANAX) 0.25 mg tablet Take 0.25 mg by mouth.  omega 3-dha-epa-fish oil (FISH OIL) 100-160-1,000 mg cap Take 1 Cap by mouth daily. No current facility-administered medications for this visit. Allergies   Allergen Reactions    Ancef [Cefazolin] Hives    Penicillins Hives        Review of Systems: A complete review of systems was obtained, negative except as described above.     Physical Exam:     Visit Vitals  /59   Pulse 74   Temp 98.2 °F (36.8 °C) (Temporal)   Resp 18   Ht 5' 6\" (1.676 m)   Wt 141 lb (64 kg)   SpO2 99%   BMI 22.76 kg/m²     ECOG PS: 0  General: No distress  Eyes: PERRLA, anicteric sclerae  HENT: Atraumatic, OP clear  Neck: Supple  Lymphatic: No cervical, supraclavicular, or inguinal adenopathy  Respiratory: CTAB, normal respiratory effort  CV: Normal rate, regular rhythm, no murmurs, no peripheral edema  GI: Soft, nontender, nondistended, no masses, no hepatomegaly, no splenomegaly  MS: Normal gait and station. Digits without clubbing or cyanosis. Skin: No rashes, ecchymoses, or petechiae. Normal temperature, turgor, and texture. Psych: Alert, oriented, appropriate affect, normal judgment/insight        Results:     Lab Results   Component Value Date/Time    WBC 3.7 07/10/2019 09:42 AM    HGB 10.9 (L) 07/10/2019 09:42 AM    HCT 31.1 (L) 07/10/2019 09:42 AM    PLATELET 225 (H) 41/76/5596 09:42 AM    .0 (H) 07/10/2019 09:42 AM    ABS. NEUTROPHILS 1.8 07/10/2019 09:42 AM     Lab Results   Component Value Date/Time    Sodium 138 06/26/2019 09:27 AM    Potassium 3.6 06/26/2019 09:27 AM    Chloride 105 06/26/2019 09:27 AM    CO2 28 06/26/2019 09:27 AM    Glucose 91 06/26/2019 09:27 AM    BUN 10 06/26/2019 09:27 AM    Creatinine 0.73 06/26/2019 09:27 AM    GFR est AA >60 06/26/2019 09:27 AM    GFR est non-AA >60 06/26/2019 09:27 AM    Calcium 9.2 06/26/2019 09:27 AM     Lab Results   Component Value Date/Time    Bilirubin, total 0.7 06/26/2019 09:27 AM    ALT (SGPT) 25 06/26/2019 09:27 AM    AST (SGOT) 22 06/26/2019 09:27 AM    Alk. phosphatase 38 (L) 06/26/2019 09:27 AM    Protein, total 6.2 (L) 06/26/2019 09:27 AM    Albumin 3.5 06/26/2019 09:27 AM    Globulin 2.7 06/26/2019 09:27 AM         Records reviewed and summarized above. Pathology report(s) reviewed above. Radiology report(s) reviewed above. Assessment/plan:   1. Right LOQ IDC, 1.4 cm, gr 2, 0/2 LN, ER +, VT +, HER 2 POSITIVE:  Stage IA (both anatomic and prognostic). Likely postmenopausal    We explained to the patient that the goal of systemic adjuvant therapy is to improve the chances for cure and decrease the risk of relapse.  We explained why a patient can have microscopic cancer spread now even though physical examination, laboratory studies and imaging studies are negative for cancer. We explained that the same treatments used now as adjuvant or preventive treatments rarely if ever are curative in women who develop metastases. Cassia Sergey suggests equivalency between q.3 week Adriamycin, Cytoxan followed by weekly paclitaxel and trastuzumab compared with the [facility] regimen. However, this study was not powered to show a difference between these two regimens, and in the [facility] publication, the AC-TH arm showed a numerically advantange (though not statistically significant) to the [facility] arm. In this patient, it is completely reasonable to use [facility] approach and avoid the potential cardiotoxicity of the anthracyclines as well as the potential for leukemia. We discussed the toxicities of docetaxel and carboplatin chemotherapy in detail. This chemotherapy frequently causes a low white blood cell count and hospital admissions for treatment of neutropenic fever. We explained that we consider the use of growth factors to minimize this risk. We explained to the patient that some side effects if they occur only last a few days including nausea, vomiting, stomatitis, arthralgia, myalgia,and allergic reactions to Taxotere. We told the patient that severe nausea and vomiting were uncommon and that some side effects,if they occur, will last longer; this includes hair loss, which will be seen in all patients treated with these agents and fatigue,which will be seen in most.  We also informed that for the patient that heart damage is rare with these agents. We explained that carboplatin can rarely cause kidney damage and high frequency hearing loss. We provided the patient in detail her information concerning the toxicities of this regimen in addition to her overall discussion.       Rationale for therapy with trastuzumab was also discussed with the patient including a 50% proportional improvement in disease free survival and also an improvement in overall survival in patients receiving trastuzumab and chemotherapy for HER-2 positive breast cancer. The side effects of trastuzumab were discussed including a 4%-5% risk of dropping her ejection fraction while on treatment and about a 1% risk of CHF. We discussed that this drug will be used every 3 weeks for remainder of a year following the chemotherapy cycles. We will check her EF before chemotherapy and every 3 months while she is receiving trastuzumab. · TCH (Trastuzumab 8mg/kg load with cycle 1 then 6mg/kg, Docetaxel 75mg/m2, Carboplatin AUC 6) given every 3 weeks x 6 cycles  · Labs: CBC, CMP prior to each treatment  · Antiemetic Prophylaxis: Palonosetron and dexamethasone prior to chemo  · PRN Antiemetics: Ondansetron, Compazine  · Swelling prophylaxis: Dexmethasone 8mg bid the day before, and day after chemotherapy  · TTE prior to chemotherapy and every 12 weeks while on Trastuzumab  · Neulasta 24-72 hours after each treatment    Also discussed the APT study results, weekly paclitaxel 80 mg/m2 x 12 with weekly trastuzumab (4 mg/kg load then 2 mg/kg)  followed by outback trastuzumab to complete one year. 42% were pT1c. I discussed the potential risks of paclitaxel chemotherapy with the patient. Major toxicities include nausea and vomiting, stomatitis, fatigue. We provided the patient with detailed information concerning toxicity including frequent toxicities that only last a few days, such as nausea, vomiting, mouth sores, arthralgia, myalgia, and potentially allergic reactions to paclitaxel, as well as toxicities which can be longer lasting including total alopecia, fatigue, anemia and neuropathy. We provided the patient with detailed information concerning the toxicities of their regimen in addition to our verbal discussion.     This is a reasonable regimen in this setting (only 4 distant recurrences over 7 years in this study, 7 year DFS was 95%)     After this discussion, she is agreeable to paclitaxel and trastuzumab as in APT, will start on 4/22. She has signed informed consent. TTE on 4/11/19, EF 64%, TTE on 7/9/19, EF 64%, next due 10/1/19, ordered. The patient was given the following prescriptions with written and verbal instructions on how to use each:  Compazine, zofran,  a wig, and emla cream.      Discussed cold caps and cryotherapy with paclitaxel. Outback herceptin #4 today. Port has been removed. Plan to continue with SubQ Herceptin. She will not require XRT. Labs reveal she is postmenopausal.     DEXA on 7/29/19 normal.  She is areeable to anastrozole 1 mg daily, rx in. Started on 8/1/19. Follow-up after early breast cancer was discussed. I recommend follow-up as defined by the American Society of Clinical Oncology and Gunner. This includes a visit to a health care professional every 3-6 months for 3 years, then every 6-12 months for 2 years, and then yearly as well as mammograms yearly. 2. Emotional well being:  She has excellent support and is coping well with her disease. 3. Diarrhea: Due to chemo. Intermittent. PRN Imodium. Will monitor. 4. Right arm pain:  Ongoing prior to treatment, intermittent but is improving. May be due to surgery. Previously referred to [facility], PT. She has met with Jaleesa Bar PT and was given exercises, but not helping at this point. Will get right shoulder XR     5. Nosebleeds:  Due to chemo. Minor, but improved. Recommend nasal saline PRN. Will monitor. 6. Headaches: Resovled; likely due to therapy/antiemetics    7. LE swelling: Resolved. Recommend elevation at rest, ambulation, and hydration. Also discussed patient to monitor for s/s of DVT. Will continue to monitor. 8.  Neuropathy:  Mild and intermittent. Will monitor. 9. Joint pain:  Due to AI. Discussed trying Cymbalta vs. 2 week AI holiday. She would like try cymbalta 30 mg, daily. Rx in.          I appreciate the opportunity to participate in MsBatool Paloom Moctezuma's care. Signed By: Refugio Denise MD      No orders of the defined types were placed in this encounter.

## 2019-09-25 NOTE — PROGRESS NOTES
[facility] Progress Note    Date: 2019    Name: Navi Rondon    MRN: [id number]         : 1965    Ms. Rosa Vergara Arrived ambulatory and in no distress for C8D1 of Herceptin Hylecta Regimen. Assessment was completed, no acute issues at this time, patient c/o lot of L shoulder pain, recent breast lift completed, patient thinks she popped a few stitches. Will be seeing her surgeon after her appointment upstairs. Patient voiced some pain at injection site after last treatment. Verified with [id number] that has to go in thigh but okay to try more lateral thigh as patient is muscular     Chemotherapy Flowsheet 2019   Cycle C8D1   Date 2019   Drug / Regimen herceptin hylecta   Pre Meds -   Notes given         Ms. Moctezuma's vitals were reviewed. Visit Vitals  /59   Pulse 74   Temp 98.2 °F (36.8 °C)   Resp 16   Ht 5' 6\" (1.676 m)   Wt 64 kg (141 lb)   SpO2 99%   Breastfeeding? No   BMI 22.76 kg/m²         Medications:  Medications Administered     trastuzumab-hyaluronidase-oysk (HERCEPTIN HYLECTA) injection 600 mg     Admin Date  2019 Action  Given Dose  600 mg Route  SubCUTAneous Administered By  Betsy Rivera RN                  Ms. Rosa Vergara tolerated treatment well and was discharged from [place] [address] in stable condition. The patient proceeded to her appointment with Dr. Fannie Parker.      Candace Schwarz RN  2019

## 2019-09-26 ENCOUNTER — TELEPHONE (OUTPATIENT)
Dept: CARDIOLOGY CLINIC | Age: 54
End: 2019-09-26

## 2019-10-07 ENCOUNTER — PATIENT MESSAGE (OUTPATIENT)
Dept: ONCOLOGY | Age: 54
End: 2019-10-07

## 2019-10-09 ENCOUNTER — APPOINTMENT (OUTPATIENT)
Dept: INFUSION THERAPY | Age: 54
End: 2019-10-09
Payer: COMMERCIAL

## 2019-10-10 ENCOUNTER — TELEPHONE (OUTPATIENT)
Dept: ONCOLOGY | Age: 54
End: 2019-10-10

## 2019-10-10 NOTE — TELEPHONE ENCOUNTER
10/10/19 4:02 PM: Called patient and confirmed that she is aware of her echocardiogram appointment on 10/14/19 at 11 AM.

## 2019-10-14 ENCOUNTER — HOSPITAL ENCOUNTER (OUTPATIENT)
Dept: VASCULAR SURGERY | Age: 54
Discharge: HOME | End: 2019-10-14
Attending: NURSE PRACTITIONER
Payer: COMMERCIAL

## 2019-10-14 ENCOUNTER — HOSPITAL ENCOUNTER (OUTPATIENT)
Dept: ULTRASOUND IMAGING | Age: 54
Discharge: HOME | End: 2019-10-14
Attending: NURSE PRACTITIONER
Payer: COMMERCIAL

## 2019-10-14 DIAGNOSIS — C50.511 MALIGNANT NEOPLASM OF LOWER-OUTER QUADRANT OF RIGHT BREAST OF FEMALE, ESTROGEN RECEPTOR POSITIVE (HCC): ICD-10-CM

## 2019-10-14 DIAGNOSIS — M79.601 PAIN OF RIGHT UPPER EXTREMITY: ICD-10-CM

## 2019-10-14 DIAGNOSIS — C50.511 MALIGNANT NEOPLASM OF LOWER-OUTER QUADRANT OF RIGHT BREAST OF FEMALE, ESTROGEN RECEPTOR POSITIVE (HCC): Primary | ICD-10-CM

## 2019-10-14 DIAGNOSIS — Z17.0 MALIGNANT NEOPLASM OF LOWER-OUTER QUADRANT OF RIGHT BREAST OF FEMALE, ESTROGEN RECEPTOR POSITIVE (HCC): ICD-10-CM

## 2019-10-14 DIAGNOSIS — Z17.0 MALIGNANT NEOPLASM OF LOWER-OUTER QUADRANT OF RIGHT BREAST OF FEMALE, ESTROGEN RECEPTOR POSITIVE (HCC): Primary | ICD-10-CM

## 2019-10-14 DIAGNOSIS — M79.5 FOREIGN BODY (FB) IN SOFT TISSUE: ICD-10-CM

## 2019-10-14 PROCEDURE — 93971 EXTREMITY STUDY: CPT

## 2019-10-14 PROCEDURE — 76604 US EXAM CHEST: CPT

## 2019-10-16 ENCOUNTER — HOSPITAL ENCOUNTER (OUTPATIENT)
Dept: INFUSION THERAPY | Age: 54
End: 2019-10-16
Payer: COMMERCIAL

## 2019-10-17 ENCOUNTER — HOSPITAL ENCOUNTER (OUTPATIENT)
Dept: INFUSION THERAPY | Age: 54
Discharge: HOME | End: 2019-10-17
Payer: COMMERCIAL

## 2019-10-17 VITALS
HEART RATE: 72 BPM | SYSTOLIC BLOOD PRESSURE: 139 MMHG | RESPIRATION RATE: 16 BRPM | TEMPERATURE: 97.3 F | DIASTOLIC BLOOD PRESSURE: 89 MMHG

## 2019-10-17 DIAGNOSIS — Z17.0 MALIGNANT NEOPLASM OF RIGHT BREAST IN FEMALE, ESTROGEN RECEPTOR POSITIVE, UNSPECIFIED SITE OF BREAST (HCC): Primary | ICD-10-CM

## 2019-10-17 DIAGNOSIS — C50.911 MALIGNANT NEOPLASM OF RIGHT BREAST IN FEMALE, ESTROGEN RECEPTOR POSITIVE, UNSPECIFIED SITE OF BREAST (HCC): Primary | ICD-10-CM

## 2019-10-17 PROCEDURE — 96401 CHEMO ANTI-NEOPL SQ/IM: CPT

## 2019-10-17 PROCEDURE — 74011250636 HC RX REV CODE- 250/636: Performed by: NURSE PRACTITIONER

## 2019-10-17 RX ADMIN — TRASTUZUMAB AND HYALURONIDASE-OYSK 600 MG: 600; 10000 INJECTION, SOLUTION SUBCUTANEOUS at 16:20

## 2019-10-17 NOTE — PROGRESS NOTES
[facility] VISIT NOTE    Pt arrived at [facility] ambulatory and in no distress for C9D1 Herceptin/Hylecta. Assessment completed, pt had no complaints. Patient Vitals for the past 12 hrs:   Temp Pulse Resp BP   10/17/19 1603 97.3 °F (36.3 °C) 72 16 139/89     Medications received:  Herceptin/Hylecta SQ (left leg)    Tolerated treatment well, no adverse reaction noted. D/C'd from [facility] ambulatory and in no distress. Next appointment is 11/6/19 at 1000. Matti Cole

## 2019-11-06 ENCOUNTER — OFFICE VISIT (OUTPATIENT)
Dept: ONCOLOGY | Age: 54
End: 2019-11-06

## 2019-11-06 ENCOUNTER — TELEPHONE (OUTPATIENT)
Dept: ONCOLOGY | Age: 54
End: 2019-11-06

## 2019-11-06 ENCOUNTER — HOSPITAL ENCOUNTER (OUTPATIENT)
Dept: INFUSION THERAPY | Age: 54
Discharge: HOME | End: 2019-11-06
Payer: COMMERCIAL

## 2019-11-06 VITALS
BODY MASS INDEX: 22.76 KG/M2 | RESPIRATION RATE: 18 BRPM | OXYGEN SATURATION: 98 % | HEIGHT: 66 IN | SYSTOLIC BLOOD PRESSURE: 131 MMHG | TEMPERATURE: 97.1 F | DIASTOLIC BLOOD PRESSURE: 79 MMHG | HEART RATE: 87 BPM | WEIGHT: 141.6 LBS

## 2019-11-06 VITALS
RESPIRATION RATE: 16 BRPM | TEMPERATURE: 97.1 F | HEIGHT: 66 IN | HEART RATE: 87 BPM | OXYGEN SATURATION: 98 % | BODY MASS INDEX: 22.76 KG/M2 | DIASTOLIC BLOOD PRESSURE: 79 MMHG | WEIGHT: 141.6 LBS | SYSTOLIC BLOOD PRESSURE: 131 MMHG

## 2019-11-06 DIAGNOSIS — C50.511 MALIGNANT NEOPLASM OF LOWER-OUTER QUADRANT OF RIGHT BREAST OF FEMALE, ESTROGEN RECEPTOR POSITIVE (HCC): Primary | ICD-10-CM

## 2019-11-06 DIAGNOSIS — Z79.899 ENCOUNTER FOR MONITORING CARDIOTOXIC DRUG THERAPY: ICD-10-CM

## 2019-11-06 DIAGNOSIS — C50.911 MALIGNANT NEOPLASM OF RIGHT BREAST IN FEMALE, ESTROGEN RECEPTOR POSITIVE, UNSPECIFIED SITE OF BREAST (HCC): Primary | ICD-10-CM

## 2019-11-06 DIAGNOSIS — Z51.81 ENCOUNTER FOR MONITORING CARDIOTOXIC DRUG THERAPY: ICD-10-CM

## 2019-11-06 DIAGNOSIS — R19.7 DIARRHEA, UNSPECIFIED TYPE: ICD-10-CM

## 2019-11-06 DIAGNOSIS — Z17.0 MALIGNANT NEOPLASM OF LOWER-OUTER QUADRANT OF RIGHT BREAST OF FEMALE, ESTROGEN RECEPTOR POSITIVE (HCC): Primary | ICD-10-CM

## 2019-11-06 DIAGNOSIS — Z78.0 POSTMENOPAUSAL: ICD-10-CM

## 2019-11-06 DIAGNOSIS — Z17.0 MALIGNANT NEOPLASM OF RIGHT BREAST IN FEMALE, ESTROGEN RECEPTOR POSITIVE, UNSPECIFIED SITE OF BREAST (HCC): Primary | ICD-10-CM

## 2019-11-06 PROCEDURE — 96401 CHEMO ANTI-NEOPL SQ/IM: CPT

## 2019-11-06 PROCEDURE — 96402 CHEMO HORMON ANTINEOPL SQ/IM: CPT

## 2019-11-06 PROCEDURE — 74011250636 HC RX REV CODE- 250/636: Performed by: NURSE PRACTITIONER

## 2019-11-06 RX ORDER — DULOXETIN HYDROCHLORIDE 60 MG/1
60 CAPSULE, DELAYED RELEASE ORAL DAILY
Qty: 60 CAP | Refills: 0 | Status: SHIPPED | OUTPATIENT
Start: 2019-11-06 | End: 2019-12-18

## 2019-11-06 RX ADMIN — TRASTUZUMAB AND HYALURONIDASE-OYSK 600 MG: 600; 10000 INJECTION, SOLUTION SUBCUTANEOUS at 12:24

## 2019-11-06 NOTE — PROGRESS NOTES
[facility] Progress Note    Date: 2019    Name: Alice Rao    MRN: [id number]         : 1965    Ms. Michael Carrasquillo Arrived ambulatory and in no distress for C10D1 of Herceptin Hylecta Regimen. Assessment was completed, no acute issues at this time, no new complaints voiced. Chemotherapy Flowsheet 2019   Cycle C10D1   Date 2019   Drug / Regimen Herceptin Hylecta   Pre Meds -   Notes given in R thigh       1030 Patient proceed to appointment with Dr. Laine Webb. Ms. Moctezuma's vitals were reviewed. Visit Vitals  /79   Pulse 87   Temp 97.1 °F (36.2 °C)   Resp 16   Ht 5' 6\" (1.676 m)   Wt 64.2 kg (141 lb 9.6 oz)   SpO2 98%   Breastfeeding? No   BMI 22.85 kg/m²       No labs obtained. Medications:  Medications Administered     trastuzumab-hyaluronidase-oysk (HERCEPTIN HYLECTA) injection 600 mg     Admin Date  2019 Action  Given Dose  600 mg Route  SubCUTAneous Administered By  Karen Garcias RN            Administered SQ in right thigh. Ms. Michael Carrasquillo tolerated treatment well and was discharged from [place] [address] in stable condition at 1235. She is to return on  at 1000 for her next appointment.     Desiree Sanchez RN  2019

## 2019-11-06 NOTE — PROGRESS NOTES
[facility] [place] [address] [address] [facility] [address], [address] [facility]  Marj Maggie: [phone]  F: [phone]      Reason for Visit:   Lg Pepper is a 62 y.o. female who is seen in consultation at the request of Dr. Brendan Santamaria for evaluation of systemic therapy for breast cancer. Consulting physician:  Dr. 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 Interval history:  In today for follow up. Complains of gr 1 diarrhea, gr 2 hair loss, gr 1 hot flashes, gr 1 insomnia, gr 1 cognition/concentration, gr 1 neuropathy.      FH:  Mother with breast cancer at age 45s and 46s; maternal aunt with breast cancer in her 46s; maternal aunt with breast cancer in her 46s; no ovarian, prostate, pancreas cancer    LMP 11/2018, spotty prior to that    Past Medical History:   Diagnosis Date    Adverse effect of anesthesia 1995    difficulty awakening      Past Surgical History:   Procedure Laterality Date    BREAST SURGERY PROCEDURE UNLISTED Left 1997    lumpectomy no lymph    COLONOSCOPY Left 12/14/2018    COLONOS[facility] by Alberto [facility] at [facility] ENDOSCOPY    HX ORTHOPAEDIC      foot surgery    IR INSERT TUNL CVC W PORT OVER 5 YEARS  4/18/2019    IR REMOVE TUNL CVAD W/O PORT / PUMP  7/19/2019      Social History     Tobacco Use    Smoking status: Former Smoker Packs/day: 1.00     Years: 20.00     Pack years: 20.00     Types: Cigarettes     Last attempt to quit: 04/2004     Years since quitting: 15.6    Smokeless tobacco: Never Used   Substance Use Topics    Alcohol use: Yes     Alcohol/week: 14.0 standard drinks     Types: 14 Glasses of wine per week      Family History   Problem Relation Age of Onset    Breast Cancer Mother         42's 1st time late 52's 2nd time   [facility] Cancer Mother         breast CA x2    Hypertension Mother     Heart Disease Father     Cancer Maternal Aunt         breast    Cancer Maternal Aunt         breast    Cancer Maternal Cousin         Breast     Current Outpatient Medications   Medication Sig    DULoxetine (CYMBALTA) 60 mg capsule Take 1 Cap by mouth daily.  COLLAGEN by Does Not Apply route daily.  DULoxetine (CYMBALTA) 30 mg capsule Take 1 Cap by mouth daily.  ascorbic acid, vitamin C, (VITAMIN C) 500 mg tablet Take 1,000 mg by mouth daily.  anastrozole (ARIMIDEX) 1 mg tablet Take 1 mg by mouth daily.  TURMERIC PO Take 2 Caps by mouth daily.  spironolactone (ALDACTONE) 25 mg tablet Take  by mouth daily.  vit B Cmplx 3-FA-Vit C-Biotin (NEPHRO VINOD RX) 1- mg-mg-mcg tablet Take 1 Tab by mouth daily.  ALPRAZolam (XANAX) 0.25 mg tablet Take 0.25 mg by mouth.  omega 3-dha-epa-fish oil (FISH OIL) 100-160-1,000 mg cap Take 1 Cap by mouth daily. No current facility-administered medications for this visit. Facility-Administered Medications Ordered in Other Visits   Medication Dose Route Frequency    trastuzumab-hyaluronidase-oysk (HERCEPTIN HYLECTA) injection 600 mg  600 mg SubCUTAneous ONCE      Allergies   Allergen Reactions    Ancef [Cefazolin] Hives    Penicillins Hives        Review of Systems: A complete review of systems was obtained, negative except as described above.     Physical Exam:     Visit Vitals  /79   Pulse 87   Temp 97.1 °F (36.2 °C) (Temporal)   Resp 18   Ht 5' 6\" (1.676 m) Wt 141 lb 9.6 oz (64.2 kg)   SpO2 98%   BMI 22.85 kg/m²     ECOG PS: 0  General: No distress  Eyes: PERRLA, anicteric sclerae  HENT: Atraumatic, OP clear  Neck: Supple  Lymphatic: No cervical, supraclavicular, or inguinal adenopathy  Respiratory: CTAB, normal respiratory effort  CV: Normal rate, regular rhythm, no murmurs, no peripheral edema  GI: Soft, nontender, nondistended, no masses, no hepatomegaly, no splenomegaly  MS: Normal gait and station. Digits without clubbing or cyanosis. Skin: No rashes, ecchymoses, or petechiae. Normal temperature, turgor, and texture. Psych: Alert, oriented, appropriate affect, normal judgment/insight        Results:     Lab Results   Component Value Date/Time    WBC 3.7 07/10/2019 09:42 AM    HGB 10.9 (L) 07/10/2019 09:42 AM    HCT 31.1 (L) 07/10/2019 09:42 AM    PLATELET 397 (H) 06/81/2356 09:42 AM    .0 (H) 07/10/2019 09:42 AM    ABS. NEUTROPHILS 1.8 07/10/2019 09:42 AM     Lab Results   Component Value Date/Time    Sodium 138 06/26/2019 09:27 AM    Potassium 3.6 06/26/2019 09:27 AM    Chloride 105 06/26/2019 09:27 AM    CO2 28 06/26/2019 09:27 AM    Glucose 91 06/26/2019 09:27 AM    BUN 10 06/26/2019 09:27 AM    Creatinine 0.73 06/26/2019 09:27 AM    GFR est AA >60 06/26/2019 09:27 AM    GFR est non-AA >60 06/26/2019 09:27 AM    Calcium 9.2 06/26/2019 09:27 AM     Lab Results   Component Value Date/Time    Bilirubin, total 0.7 06/26/2019 09:27 AM    ALT (SGPT) 25 06/26/2019 09:27 AM    AST (SGOT) 22 06/26/2019 09:27 AM    Alk. phosphatase 38 (L) 06/26/2019 09:27 AM    Protein, total 6.2 (L) 06/26/2019 09:27 AM    Albumin 3.5 06/26/2019 09:27 AM    Globulin 2.7 06/26/2019 09:27 AM     10/14/19 BUE doppler: negative. Records reviewed and summarized above. Pathology report(s) reviewed above. Radiology report(s) reviewed above. Assessment/plan:   1. Right LOQ IDC, 1.4 cm, gr 2, 0/2 LN, ER +, LA +, HER 2 POSITIVE:  Stage IA (both anatomic and prognostic). Likely postmenopausal    We explained to the patient that the goal of systemic adjuvant therapy is to improve the chances for cure and decrease the risk of relapse. We explained why a patient can have microscopic cancer spread now even though physical examination, laboratory studies and imaging studies are negative for cancer. We explained that the same treatments used now as adjuvant or preventive treatments rarely if ever are curative in women who develop metastases. Mahesh Luna suggests equivalency between q.3 week Adriamycin, Cytoxan followed by weekly paclitaxel and trastuzumab compared with the [facility] regimen. However, this study was not powered to show a difference between these two regimens, and in the [facility] publication, the AC-TH arm showed a numerically advantange (though not statistically significant) to the [facility] arm. In this patient, it is completely reasonable to use [facility] approach and avoid the potential cardiotoxicity of the anthracyclines as well as the potential for leukemia. We discussed the toxicities of docetaxel and carboplatin chemotherapy in detail. This chemotherapy frequently causes a low white blood cell count and hospital admissions for treatment of neutropenic fever. We explained that we consider the use of growth factors to minimize this risk. We explained to the patient that some side effects if they occur only last a few days including nausea, vomiting, stomatitis, arthralgia, myalgia,and allergic reactions to Taxotere. We told the patient that severe nausea and vomiting were uncommon and that some side effects,if they occur, will last longer; this includes hair loss, which will be seen in all patients treated with these agents and fatigue,which will be seen in most.  We also informed that for the patient that heart damage is rare with these agents. We explained that carboplatin can rarely cause kidney damage and high frequency hearing loss.   We provided the patient in detail her information concerning the toxicities of this regimen in addition to her overall discussion. Rationale for therapy with trastuzumab was also discussed with the patient including a 50% proportional improvement in disease free survival and also an improvement in overall survival in patients receiving trastuzumab and chemotherapy for HER-2 positive breast cancer. The side effects of trastuzumab were discussed including a 4%-5% risk of dropping her ejection fraction while on treatment and about a 1% risk of CHF. We discussed that this drug will be used every 3 weeks for remainder of a year following the chemotherapy cycles. We will check her EF before chemotherapy and every 3 months while she is receiving trastuzumab. · TCH (Trastuzumab 8mg/kg load with cycle 1 then 6mg/kg, Docetaxel 75mg/m2, Carboplatin AUC 6) given every 3 weeks x 6 cycles  · Labs: CBC, CMP prior to each treatment  · Antiemetic Prophylaxis: Palonosetron and dexamethasone prior to chemo  · PRN Antiemetics: Ondansetron, Compazine  · Swelling prophylaxis: Dexmethasone 8mg bid the day before, and day after chemotherapy  · TTE prior to chemotherapy and every 12 weeks while on Trastuzumab  · Neulasta 24-72 hours after each treatment    Also discussed the APT study results, weekly paclitaxel 80 mg/m2 x 12 with weekly trastuzumab (4 mg/kg load then 2 mg/kg)  followed by outback trastuzumab to complete one year. 42% were pT1c. I discussed the potential risks of paclitaxel chemotherapy with the patient. Major toxicities include nausea and vomiting, stomatitis, fatigue. We provided the patient with detailed information concerning toxicity including frequent toxicities that only last a few days, such as nausea, vomiting, mouth sores, arthralgia, myalgia, and potentially allergic reactions to paclitaxel, as well as toxicities which can be longer lasting including total alopecia, fatigue, anemia and neuropathy.  We provided the patient with detailed information concerning the toxicities of their regimen in addition to our verbal discussion. This is a reasonable regimen in this setting (only 4 distant recurrences over 7 years in this study, 7 year DFS was 95%)     After this discussion, she is agreeable to paclitaxel and trastuzumab as in APT, will start on 4/22. She has signed informed consent. TTE on 4/11/19, EF 64%, TTE on 7/9/19, EF 64%, TTE on 10/14/19, EF 62%. The patient was given the following prescriptions with written and verbal instructions on how to use each:  Compazine, zofran,  a wig, and emla cream.      Discussed cold caps and cryotherapy with paclitaxel. Outback herceptin #6 today. Port has been removed. Plan to continue with SubQ Herceptin. She will not require XRT. Labs reveal she is postmenopausal.     DEXA on 7/29/19 normal.  She is areeable to anastrozole 1 mg daily, rx in. Started on 8/1/19. Follow-up after early breast cancer was discussed. I recommend follow-up as defined by the American Society of Clinical Oncology and [facility] [place]. This includes a visit to a health care professional every 3-6 months for 3 years, then every 6-12 months for 2 years, and then yearly as well as mammograms yearly. 2. Emotional well being:  She has excellent support and is coping well with her disease. 3. Diarrhea: Due to chemo. Intermittent. PRN Imodium. Will monitor. 4. Right arm pain:  Ongoing prior to treatment, intermittent but is improving. May be due to surgery. Previously referred to [facility], PT. She has met with Xin Davis PT and was given exercises, but not helping at this point. Right shoulder XRAY showed arthritis    5. Nosebleeds:  Due to chemo. Minor, but improved. Recommend nasal saline PRN. Will monitor. 6. Headaches: Resovled; likely due to therapy/antiemetics    7. LE swelling: Resolved. Recommend elevation at rest, ambulation, and hydration.   Also discussed patient to monitor for s/s of DVT. Will continue to monitor. 8.  Neuropathy:  To both hands, but mostly to right hand. Most likely due to chemo. Plan to increase Cymbalta. Will monitor. 9. Joint pain:  Due to AI. Discussed trying Cymbalta vs. 2 week AI holiday. Currently cymbalta 30 mg, daily with some improvement, will increase to 60 mg, daily, rx in. I appreciate the opportunity to participate in Ms. Alicia Moctezuma's care. Signed By: Faiza Rosas MD      No orders of the defined types were placed in this encounter.

## 2019-11-06 NOTE — TELEPHONE ENCOUNTER
[facility] called and stated they need clarification on a prescription that was called in for the patient.  XS#[phone]

## 2019-11-27 ENCOUNTER — HOSPITAL ENCOUNTER (OUTPATIENT)
Dept: INFUSION THERAPY | Age: 54
Discharge: HOME | End: 2019-11-27
Payer: COMMERCIAL

## 2019-11-27 VITALS
OXYGEN SATURATION: 99 % | DIASTOLIC BLOOD PRESSURE: 86 MMHG | TEMPERATURE: 97.9 F | RESPIRATION RATE: 16 BRPM | HEART RATE: 85 BPM | SYSTOLIC BLOOD PRESSURE: 129 MMHG

## 2019-11-27 DIAGNOSIS — Z17.0 MALIGNANT NEOPLASM OF RIGHT BREAST IN FEMALE, ESTROGEN RECEPTOR POSITIVE, UNSPECIFIED SITE OF BREAST (HCC): Primary | ICD-10-CM

## 2019-11-27 DIAGNOSIS — C50.911 MALIGNANT NEOPLASM OF RIGHT BREAST IN FEMALE, ESTROGEN RECEPTOR POSITIVE, UNSPECIFIED SITE OF BREAST (HCC): Primary | ICD-10-CM

## 2019-11-27 PROCEDURE — 96402 CHEMO HORMON ANTINEOPL SQ/IM: CPT

## 2019-11-27 PROCEDURE — 74011250636 HC RX REV CODE- 250/636: Performed by: NURSE PRACTITIONER

## 2019-11-27 PROCEDURE — 96401 CHEMO ANTI-NEOPL SQ/IM: CPT

## 2019-11-27 RX ORDER — ANASTROZOLE 1 MG/1
1 TABLET ORAL DAILY
Qty: 90 TAB | Refills: 3 | Status: SHIPPED | OUTPATIENT
Start: 2019-11-27 | End: 2020-01-08

## 2019-11-27 RX ADMIN — TRASTUZUMAB AND HYALURONIDASE-OYSK 600 MG: 600; 10000 INJECTION, SOLUTION SUBCUTANEOUS at 11:16

## 2019-11-27 NOTE — PROGRESS NOTES
[facility] Progress Note    Date: 2019    Name: Maryan Alexander    MRN: [id number]         : 1965    Ms. Kemar Britt Arrived ambulatory and in no distress for C11D1 of  Herceptin Hylecta Regimen. Assessment was completed, no acute issues at this time, no new complaints voiced. Chemotherapy Flowsheet 2019   Cycle C11D1   Date 2019   Drug / Regimen Herceptin Hylecta   Pre Meds -   Notes given         Ms. Moctezuma's vitals were reviewed. Visit Vitals  /86   Pulse 85   Temp 97.9 °F (36.6 °C)   Resp 16   SpO2 99%         Medications:  Medications Administered     trastuzumab-hyaluronidase-oysk (HERCEPTIN HYLECTA) injection 600 mg     Admin Date  2019 Action  Given Dose  600 mg Route  SubCUTAneous Administered By  Jessie Wynn RN                Ms. Kemar Britt tolerated treatment well and was discharged from [place] [address] in stable condition. Port de-accessed, flushed & heparinized per protocol. She is to return on  19for her next appointment.     Mitchell Rockwell RN  2019

## 2019-12-09 RX ORDER — EPINEPHRINE 1 MG/ML
0.3 INJECTION, SOLUTION, CONCENTRATE INTRAVENOUS AS NEEDED
Status: CANCELLED | OUTPATIENT
Start: 2019-12-18

## 2019-12-09 RX ORDER — DIPHENHYDRAMINE HYDROCHLORIDE 50 MG/ML
50 INJECTION, SOLUTION INTRAMUSCULAR; INTRAVENOUS AS NEEDED
Status: CANCELLED
Start: 2019-12-18

## 2019-12-09 RX ORDER — ALBUTEROL SULFATE 0.83 MG/ML
2.5 SOLUTION RESPIRATORY (INHALATION) AS NEEDED
Status: CANCELLED
Start: 2019-12-18

## 2019-12-09 RX ORDER — ONDANSETRON 2 MG/ML
8 INJECTION INTRAMUSCULAR; INTRAVENOUS AS NEEDED
Status: CANCELLED | OUTPATIENT
Start: 2019-12-18

## 2019-12-09 RX ORDER — ACETAMINOPHEN 325 MG/1
650 TABLET ORAL AS NEEDED
Status: CANCELLED
Start: 2019-12-18

## 2019-12-09 RX ORDER — HYDROCORTISONE SODIUM SUCCINATE 100 MG/2ML
100 INJECTION, POWDER, FOR SOLUTION INTRAMUSCULAR; INTRAVENOUS AS NEEDED
Status: CANCELLED | OUTPATIENT
Start: 2019-12-18

## 2019-12-18 ENCOUNTER — TELEPHONE (OUTPATIENT)
Dept: ONCOLOGY | Age: 54
End: 2019-12-18

## 2019-12-18 ENCOUNTER — HOSPITAL ENCOUNTER (OUTPATIENT)
Dept: INFUSION THERAPY | Age: 54
Discharge: HOME | End: 2019-12-18
Payer: COMMERCIAL

## 2019-12-18 ENCOUNTER — OFFICE VISIT (OUTPATIENT)
Dept: ONCOLOGY | Age: 54
End: 2019-12-18

## 2019-12-18 VITALS
OXYGEN SATURATION: 95 % | SYSTOLIC BLOOD PRESSURE: 160 MMHG | TEMPERATURE: 98.5 F | HEART RATE: 93 BPM | DIASTOLIC BLOOD PRESSURE: 77 MMHG | RESPIRATION RATE: 18 BRPM | HEIGHT: 66 IN | BODY MASS INDEX: 23.46 KG/M2 | WEIGHT: 146 LBS

## 2019-12-18 VITALS
BODY MASS INDEX: 23.56 KG/M2 | WEIGHT: 146.6 LBS | RESPIRATION RATE: 18 BRPM | HEIGHT: 66 IN | TEMPERATURE: 98.5 F | SYSTOLIC BLOOD PRESSURE: 160 MMHG | HEART RATE: 93 BPM | OXYGEN SATURATION: 95 % | DIASTOLIC BLOOD PRESSURE: 77 MMHG

## 2019-12-18 DIAGNOSIS — C50.511 MALIGNANT NEOPLASM OF LOWER-OUTER QUADRANT OF RIGHT BREAST OF FEMALE, ESTROGEN RECEPTOR POSITIVE (HCC): Primary | ICD-10-CM

## 2019-12-18 DIAGNOSIS — Z17.0 MALIGNANT NEOPLASM OF LOWER-OUTER QUADRANT OF RIGHT BREAST OF FEMALE, ESTROGEN RECEPTOR POSITIVE (HCC): Primary | ICD-10-CM

## 2019-12-18 DIAGNOSIS — C50.911 MALIGNANT NEOPLASM OF RIGHT BREAST IN FEMALE, ESTROGEN RECEPTOR POSITIVE, UNSPECIFIED SITE OF BREAST (HCC): Primary | ICD-10-CM

## 2019-12-18 DIAGNOSIS — Z17.0 MALIGNANT NEOPLASM OF RIGHT BREAST IN FEMALE, ESTROGEN RECEPTOR POSITIVE, UNSPECIFIED SITE OF BREAST (HCC): Primary | ICD-10-CM

## 2019-12-18 PROCEDURE — 74011250636 HC RX REV CODE- 250/636: Performed by: NURSE PRACTITIONER

## 2019-12-18 PROCEDURE — 96401 CHEMO ANTI-NEOPL SQ/IM: CPT

## 2019-12-18 PROCEDURE — 90471 IMMUNIZATION ADMIN: CPT

## 2019-12-18 PROCEDURE — 90686 IIV4 VACC NO PRSV 0.5 ML IM: CPT | Performed by: NURSE PRACTITIONER

## 2019-12-18 RX ORDER — DULOXETIN HYDROCHLORIDE 30 MG/1
30 CAPSULE, DELAYED RELEASE ORAL DAILY
Qty: 30 CAP | Refills: 1 | Status: SHIPPED | OUTPATIENT
Start: 2019-12-18 | End: 2020-02-28

## 2019-12-18 RX ADMIN — INFLUENZA VIRUS VACCINE 0.5 ML: 15; 15; 15; 15 SUSPENSION INTRAMUSCULAR at 12:28

## 2019-12-18 RX ADMIN — TRASTUZUMAB AND HYALURONIDASE-OYSK 600 MG: 600; 10000 INJECTION, SOLUTION SUBCUTANEOUS at 10:38

## 2019-12-18 NOTE — PROGRESS NOTES
[facility] Progress Note    Date: 2019    Name: Jenita Landau    MRN: [id number]         : 1965    1020. Ms. Yesenia Alvarez Arrived ambulatory and in no distress for C12D1 of Herceptin Hylecta Regimen. Assessment was completed, no acute issues at this time, no new complaints voiced. Okay per Fanta Grewal to administer medication prior to appointment. Chemotherapy Flowsheet 2019   Cycle C12D1   Date 2019   Drug / Regimen Herceptin Hylecta   Pre Meds -   Notes L thigh         Patient Vitals for the past 12 hrs:   Temp Pulse Resp BP SpO2   19 1025 98.5 °F (36.9 °C) 93 18 160/77 95 %       Medications:  Herpcetin Hylecta to L thigh over 5 minutes    1050. Ms. Moctezuma tolerated treatment well and was discharged from [place] [address] in stable conditionl. She is to return on 19 for her next appointment.     Lydia Rogers RN  2019

## 2019-12-18 NOTE — PROGRESS NOTES
Sosa Mtz is a 62 y.o. female Follow up for the Evaluation for Breast Cancer. 1. Have you been to the ER, urgent care clinic since your last visit? Hospitalized since your last visit? No    2. Have you seen or consulted any other health care providers outside of the [address] [place] [address] since your last visit? Include any pap smears or colon screening.  No

## 2019-12-18 NOTE — TELEPHONE ENCOUNTER
[address] at [place] [address]  [phone]        12/18/19 1:58 PM Scheduled patient's ECHO for 01/22 at 10 AM at [facility] [address]. Left message via home/cell number listed requesting that she return call to inform of above. Provided office phone number for patient to call back. 2:13 PM Patient returned call. Informed her of above. She verbalized understanding and denies any further questions or concerns at this time.

## 2019-12-18 NOTE — PROGRESS NOTES
[facility] Progress Note    Date: 2019    Name: Navi Rondon    MRN: [id number]         : 1965    Pt came in for flu vaccine.     Medications:  Flu vaccine IM left arm    Skyla Leblanc RN  2019

## 2019-12-18 NOTE — PROGRESS NOTES
[facility] at [address], [address] [facility]  Nabil Roy: [phone]  F: [phone]      Reason for Visit:   Marvin Miller is a 62 y.o. female who is seen in consultation at the request of Dr. Yonatan Salcedo for evaluation of systemic therapy for breast cancer. Consulting physician:  Dr. Kamila Soto    Treatment History:   · 12/10/18 right breast US bx:  IMC, gr 1, 0.12 cm (1.2 mm); insufficient for receptors  · 1/25/19 right breast core bx:  11:00 lobular intraepithelial neoplasia; right breast core bx 7:00:  DCIS, gr 2-3, cribriform, 1.4 cm, ER + at 94%, FL + at 54%  · 2/28/19 right mastectomy : LOQ IDC, 1.4 cm, ER + at 89%, FL + at 78%, HER 2 POSITIVE (IHC 2+, FISH ratio 3.3; sig/cell 9.2) ; ki67 22%, gr 2, DCIS present and extensive, 0/2 LN; pT1c pN0 cM0  · Myriad Myrisk negative  ·  4/22/19-7/10/19  · Outback Herceptin 7/17/19-  · Anastrozole-8/1/19-      History of Present Illness: An abnormal mammogram 11/2018 led to the pathology above. Interval history:  In today for follow up. Complains of gr 1 diarrhea, gr 1 hot flashes, gr 1 insomnia, gr 1 loss of cognition and concentration, gr 1 neuropathy    FH:   Mother with breast cancer at age 45s and 46s; maternal aunt with breast cancer in her 46s; maternal aunt with breast cancer in her 46s; no ovarian, prostate, pancreas cancer    LMP 11/2018, spotty prior to that    Past Medical History:   Diagnosis Date    Adverse effect of anesthesia 1995    difficulty awakening      Past Surgical History:   Procedure Laterality Date    BREAST SURGERY PROCEDURE UNLISTED Left 1997    lumpectomy no lymph    COLONOSCOPY Left 12/14/2018    COLON[facility] ENDOSCOPY    HX ORTHOPAEDIC      foot surgery    IR INSERT TUNL CVC W PORT OVER 5 YEARS  4/18/2019    IR REMOVE TUNL CVAD W/O PORT / PUMP  7/19/2019      Social History     Tobacco Use    Smoking status: Former Smoker     Packs/day: 1.00     Years: 20.00     Pack years: 20.00     Types: Cigarettes     Last attempt to quit: 04/2004     Years since quitting: 15.7    Smokeless tobacco: Never Used   Substance Use Topics    Alcohol use: Yes     Alcohol/week: 14.0 standard drinks     Types: 14 Glasses of wine per week      Family History   Problem Relation Age of Onset    Breast Cancer Mother         42's 1st time late 52's 2nd time   [place] Cancer Mother         breast CA x2    Hypertension Mother     Heart Disease Father     Cancer Maternal Aunt         breast    Cancer Maternal Aunt         breast    Cancer Maternal Cousin         Breast     Current Outpatient Medications   Medication Sig    anastrozole (ARIMIDEX) 1 mg tablet Take 1 mg by mouth daily.  DULoxetine (CYMBALTA) 60 mg capsule Take 1 Cap by mouth daily.  COLLAGEN by Does Not Apply route daily.  DULoxetine (CYMBALTA) 30 mg capsule Take 1 Cap by mouth daily.  ascorbic acid, vitamin C, (VITAMIN C) 500 mg tablet Take 1,000 mg by mouth daily.  TURMERIC PO Take 2 Caps by mouth daily.  ALPRAZolam (XANAX) 0.25 mg tablet Take 0.25 mg by mouth.  spironolactone (ALDACTONE) 25 mg tablet Take  by mouth daily.  vit B Cmplx 3-FA-Vit C-Biotin (NEPHRO VINOD RX) 1- mg-mg-mcg tablet Take 1 Tab by mouth daily.  omega 3-dha-epa-fish oil (FISH OIL) 100-160-1,000 mg cap Take 1 Cap by mouth daily. No current facility-administered medications for this visit. Allergies   Allergen Reactions    Ancef [Cefazolin] Hives    Penicillins Hives        Review of Systems: A complete review of systems was obtained, negative except as described above.     Physical Exam:     Visit Vitals  /77 (BP 1 Location: Left arm, BP Patient Position: Sitting)   Pulse 93   Temp 98.5 °F (36.9 °C) (Temporal)   Resp 18   Ht 5' 6\" (1.676 m)   Wt 146 lb (66.2 kg)   SpO2 95%   BMI 23.57 kg/m²     ECOG PS: 0  General: No distress  Eyes: PERRLA, anicteric sclerae  HENT: Atraumatic, OP clear  Neck: Supple  Lymphatic: No cervical, supraclavicular, or inguinal adenopathy  Respiratory: CTAB, normal respiratory effort  CV: Normal rate, regular rhythm, no murmurs, no peripheral edema  GI: Soft, nontender, nondistended, no masses, no hepatomegaly, no splenomegaly  MS: Normal gait and station. Digits without clubbing or cyanosis. Skin: No rashes, ecchymoses, or petechiae. Normal temperature, turgor, and texture. Psych: Alert, oriented, appropriate affect, normal judgment/insight        Results:     Lab Results   Component Value Date/Time    WBC 3.7 07/10/2019 09:42 AM    HGB 10.9 (L) 07/10/2019 09:42 AM    HCT 31.1 (L) 07/10/2019 09:42 AM    PLATELET 051 (H) 11/28/2833 09:42 AM    .0 (H) 07/10/2019 09:42 AM    ABS. NEUTROPHILS 1.8 07/10/2019 09:42 AM     Lab Results   Component Value Date/Time    Sodium 138 06/26/2019 09:27 AM    Potassium 3.6 06/26/2019 09:27 AM    Chloride 105 06/26/2019 09:27 AM    CO2 28 06/26/2019 09:27 AM    Glucose 91 06/26/2019 09:27 AM    BUN 10 06/26/2019 09:27 AM    Creatinine 0.73 06/26/2019 09:27 AM    GFR est AA >60 06/26/2019 09:27 AM    GFR est non-AA >60 06/26/2019 09:27 AM    Calcium 9.2 06/26/2019 09:27 AM     Lab Results   Component Value Date/Time    Bilirubin, total 0.7 06/26/2019 09:27 AM    ALT (SGPT) 25 06/26/2019 09:27 AM    AST (SGOT) 22 06/26/2019 09:27 AM    Alk. phosphatase 38 (L) 06/26/2019 09:27 AM    Protein, total 6.2 (L) 06/26/2019 09:27 AM    Albumin 3.5 06/26/2019 09:27 AM    Globulin 2.7 06/26/2019 09:27 AM     10/14/19 BUE doppler: negative. Records reviewed and summarized above. Pathology report(s) reviewed above. Radiology report(s) reviewed above. Assessment/plan:   1. Right LOQ IDC, 1.4 cm, gr 2, 0/2 LN, ER +, ID +, HER 2 POSITIVE:  Stage IA (both anatomic and prognostic).   Likely postmenopausal    We explained to the patient that the goal of systemic adjuvant therapy is to improve the chances for cure and decrease the risk of relapse. We explained why a patient can have microscopic cancer spread now even though physical examination, laboratory studies and imaging studies are negative for cancer. We explained that the same treatments used now as adjuvant or preventive treatments rarely if ever are curative in women who develop metastases. Skip Lizama suggests equivalency between q.3 week Adriamycin, Cytoxan followed by weekly paclitaxel and trastuzumab compared with the [facility] regimen. However, this study was not powered to show a difference between these two regimens, and in the [facility] publication, the AC-TH arm showed a numerically advantange (though not statistically significant) to the [facility] arm. In this patient, it is completely reasonable to use [facility] approach and avoid the potential cardiotoxicity of the anthracyclines as well as the potential for leukemia. We discussed the toxicities of docetaxel and carboplatin chemotherapy in detail. This chemotherapy frequently causes a low white blood cell count and hospital admissions for treatment of neutropenic fever. We explained that we consider the use of growth factors to minimize this risk. We explained to the patient that some side effects if they occur only last a few days including nausea, vomiting, stomatitis, arthralgia, myalgia,and allergic reactions to Taxotere. We told the patient that severe nausea and vomiting were uncommon and that some side effects,if they occur, will last longer; this includes hair loss, which will be seen in all patients treated with these agents and fatigue,which will be seen in most.  We also informed that for the patient that heart damage is rare with these agents. We explained that carboplatin can rarely cause kidney damage and high frequency hearing loss. We provided the patient in detail her information concerning the toxicities of this regimen in addition to her overall discussion.       Rationale for therapy with trastuzumab was also discussed with the patient including a 50% proportional improvement in disease free survival and also an improvement in overall survival in patients receiving trastuzumab and chemotherapy for HER-2 positive breast cancer. The side effects of trastuzumab were discussed including a 4%-5% risk of dropping her ejection fraction while on treatment and about a 1% risk of CHF. We discussed that this drug will be used every 3 weeks for remainder of a year following the chemotherapy cycles. We will check her EF before chemotherapy and every 3 months while she is receiving trastuzumab. · TCH (Trastuzumab 8mg/kg load with cycle 1 then 6mg/kg, Docetaxel 75mg/m2, Carboplatin AUC 6) given every 3 weeks x 6 cycles  · Labs: CBC, CMP prior to each treatment  · Antiemetic Prophylaxis: Palonosetron and dexamethasone prior to chemo  · PRN Antiemetics: Ondansetron, Compazine  · Swelling prophylaxis: Dexmethasone 8mg bid the day before, and day after chemotherapy  · TTE prior to chemotherapy and every 12 weeks while on Trastuzumab  · Neulasta 24-72 hours after each treatment    Also discussed the APT study results, weekly paclitaxel 80 mg/m2 x 12 with weekly trastuzumab (4 mg/kg load then 2 mg/kg)  followed by outback trastuzumab to complete one year. 42% were pT1c. I discussed the potential risks of paclitaxel chemotherapy with the patient. Major toxicities include nausea and vomiting, stomatitis, fatigue. We provided the patient with detailed information concerning toxicity including frequent toxicities that only last a few days, such as nausea, vomiting, mouth sores, arthralgia, myalgia, and potentially allergic reactions to paclitaxel, as well as toxicities which can be longer lasting including total alopecia, fatigue, anemia and neuropathy. We provided the patient with detailed information concerning the toxicities of their regimen in addition to our verbal discussion.     This is a reasonable regimen in this setting (only 4 distant recurrences over 7 years in this study, 7 year DFS was 95%)     After this discussion, she is agreeable to paclitaxel and trastuzumab as in APT, will start on 4/22. She has signed informed consent. TTE on 4/11/19, EF 64%, TTE on 7/9/19, EF 64%, TTE on 10/14/19, EF 62%. Next TTE due mid-jan, ordered    Outback herceptin #8 today. Port has been removed. Plan to continue with SubQ Herceptin. She will not require XRT. Labs reveal she is postmenopausal.     DEXA on 7/29/19 normal.  She is areeable to anastrozole 1 mg daily, rx in. Started on 8/1/19. Follow-up after early breast cancer was discussed. I recommend follow-up as defined by the American Society of Clinical Oncology and [employer]. This includes a visit to a health care professional every 3-6 months for 3 years, then every 6-12 months for 2 years, and then yearly as well as mammograms yearly. 2. Emotional well being:  She has excellent support and is coping well with her disease. 3. Diarrhea: Due to chemo. Intermittent. PRN Imodium. Will monitor. Every morning    4. Right arm pain:  Ongoing prior to treatment, intermittent but is improving. May be due to surgery. Previously referred to [facility], PT. She has met with Vivian Marvin PT and was given exercises, but not helping at this point. Right shoulder XRAY showed arthritis    5. Headaches: Resovled; likely due to therapy/antiemetics    6. Neuropathy:  To both hands, but mostly to right hand. Most likely due to chemo. On cymbalta. Will monitor. 7. Joint pain:  Due to AI. On cymbalta 60 mg daily; will take a 2 week AI holiday and probably switch to exemestane. Will then reduce the cymbalta to 30 mg daily      I appreciate the opportunity to participate in Ms. Jackson Moctezuma's care. Signed By: Angelica Padilla MD      No orders of the defined types were placed in this encounter.

## 2019-12-18 NOTE — PATIENT INSTRUCTIONS
Exemestane (By mouth)   Exemestane (yi-e-YYS-tane)  Treats breast cancer in women who have stopped menstruating (postmenopausal). Brand Name(s): Aromasin   There may be other brand names for this medicine. When This Medicine Should Not Be Used: This medicine is not right for everyone. Do not use it if you had an allergic reaction to exemestane. How to Use This Medicine:   Tablet  · Your doctor will tell you how much medicine to use. Do not use more than directed. · It is best to take this medicine with food or milk. · Read and follow the patient instructions that come with this medicine. Talk to your doctor or pharmacist if you have any questions. · Missed dose: Take a dose as soon as you remember. If it is almost time for your next dose, wait until then and take a regular dose. Do not take extra medicine to make up for a missed dose. · Store the medicine in a closed container at room temperature, away from heat, moisture, and direct light. Drugs and Foods to Avoid:   Ask your doctor or pharmacist before using any other medicine, including over-the-counter medicines, vitamins, and herbal products. · Do not use exemestane with hormone medicine that contains estrogen, including birth control pills or patches. · Some foods and medicines can affect how exemestane works. Tell your doctor if you are using carbamazepine, phenobarbital, phenytoin, rifampicin, or Edgardo's wort. Warnings While Using This Medicine:   · Pregnancy after menopause is not likely, but if you think you could be pregnant, tell your doctor. This medicine could harm an unborn baby. If you are a woman who can bear children, your doctor may give you a pregnancy test 7 days before you start using this medicine to make sure you are not pregnant. Use an effective form of birth control during treatment and for 1 month after the last dose. · Do not breastfeed during treatment and for 1 month after the last dose.   · Tell your doctor if you have bone marrow problems (such as lymphocytopenia) or bone problems (such as osteoporosis). · This medicine may cause weaker bones, which may lead to osteoporosis and fracture. · Medicines used to treat cancer are very strong and can have many side effects. Before receiving this medicine, make sure you understand all the risks and benefits. It is important for you to work closely with your doctor during your treatment. · Your doctor will do lab tests at regular visits to check on the effects of this medicine. Keep all appointments. · Keep all medicine out of the reach of children. Never share your medicine with anyone. Possible Side Effects While Using This Medicine:   Call your doctor right away if you notice any of these side effects:  · Allergic reaction: Itching or hives, swelling in your face or hands, swelling or tingling in your mouth or throat, chest tightness, trouble breathing  · Chest pain, trouble breathing, uneven heartbeat  · Unusual or severe bone or back pain  If you notice these less serious side effects, talk with your doctor:   · Headache, nausea  · Increased sweating  · Joint pain  · Tiredness  · Trouble sleeping  · Warmth or redness in your face, neck, arms, or upper chest  If you notice other side effects that you think are caused by this medicine, tell your doctor. Call your doctor for medical advice about side effects. You may report side effects to FDA at 2-400-FDA-0062  © 2017 [facility] Information is for End User's use only and may not be sold, redistributed or otherwise used for commercial purposes. The above information is an  only. It is not intended as medical advice for individual conditions or treatments. Talk to your doctor, nurse or pharmacist before following any medical regimen to see if it is safe and effective for you.

## 2019-12-30 ENCOUNTER — HOSPITAL ENCOUNTER (OUTPATIENT)
Dept: MAMMOGRAPHY | Age: 54
Discharge: HOME | End: 2019-12-30
Attending: OBSTETRICS & GYNECOLOGY
Payer: COMMERCIAL

## 2019-12-30 DIAGNOSIS — Z85.3 HX: BREAST CANCER: ICD-10-CM

## 2019-12-30 PROCEDURE — 77063 BREAST TOMOSYNTHESIS BI: CPT

## 2019-12-31 ENCOUNTER — HOSPITAL ENCOUNTER (OUTPATIENT)
Dept: LAB | Age: 54
Discharge: HOME | End: 2019-12-31

## 2020-01-03 RX ORDER — ONDANSETRON 2 MG/ML
8 INJECTION INTRAMUSCULAR; INTRAVENOUS AS NEEDED
Status: CANCELLED | OUTPATIENT
Start: 2020-01-08

## 2020-01-03 RX ORDER — ALBUTEROL SULFATE 0.83 MG/ML
2.5 SOLUTION RESPIRATORY (INHALATION) AS NEEDED
Status: CANCELLED
Start: 2020-01-29

## 2020-01-03 RX ORDER — HYDROCORTISONE SODIUM SUCCINATE 100 MG/2ML
100 INJECTION, POWDER, FOR SOLUTION INTRAMUSCULAR; INTRAVENOUS AS NEEDED
Status: CANCELLED | OUTPATIENT
Start: 2020-01-08

## 2020-01-03 RX ORDER — ALBUTEROL SULFATE 0.83 MG/ML
2.5 SOLUTION RESPIRATORY (INHALATION) AS NEEDED
Status: CANCELLED
Start: 2020-01-08

## 2020-01-03 RX ORDER — DIPHENHYDRAMINE HYDROCHLORIDE 50 MG/ML
50 INJECTION, SOLUTION INTRAMUSCULAR; INTRAVENOUS AS NEEDED
Status: CANCELLED
Start: 2020-01-29

## 2020-01-03 RX ORDER — DIPHENHYDRAMINE HYDROCHLORIDE 50 MG/ML
50 INJECTION, SOLUTION INTRAMUSCULAR; INTRAVENOUS AS NEEDED
Status: CANCELLED
Start: 2020-01-08

## 2020-01-03 RX ORDER — ONDANSETRON 2 MG/ML
8 INJECTION INTRAMUSCULAR; INTRAVENOUS AS NEEDED
Status: CANCELLED | OUTPATIENT
Start: 2020-01-29

## 2020-01-03 RX ORDER — EPINEPHRINE 1 MG/ML
0.3 INJECTION, SOLUTION, CONCENTRATE INTRAVENOUS AS NEEDED
Status: CANCELLED | OUTPATIENT
Start: 2020-01-08

## 2020-01-03 RX ORDER — HYDROCORTISONE SODIUM SUCCINATE 100 MG/2ML
100 INJECTION, POWDER, FOR SOLUTION INTRAMUSCULAR; INTRAVENOUS AS NEEDED
Status: CANCELLED | OUTPATIENT
Start: 2020-01-29

## 2020-01-03 RX ORDER — ACETAMINOPHEN 325 MG/1
650 TABLET ORAL AS NEEDED
Status: CANCELLED
Start: 2020-01-08

## 2020-01-03 RX ORDER — EPINEPHRINE 1 MG/ML
0.3 INJECTION, SOLUTION, CONCENTRATE INTRAVENOUS AS NEEDED
Status: CANCELLED | OUTPATIENT
Start: 2020-01-29

## 2020-01-03 RX ORDER — ACETAMINOPHEN 325 MG/1
650 TABLET ORAL AS NEEDED
Status: CANCELLED
Start: 2020-01-29

## 2020-01-08 ENCOUNTER — OFFICE VISIT (OUTPATIENT)
Dept: ONCOLOGY | Age: 55
End: 2020-01-08

## 2020-01-08 ENCOUNTER — HOSPITAL ENCOUNTER (OUTPATIENT)
Dept: INFUSION THERAPY | Age: 55
Discharge: HOME | End: 2020-01-08
Payer: COMMERCIAL

## 2020-01-08 VITALS
HEIGHT: 66 IN | WEIGHT: 141 LBS | HEART RATE: 89 BPM | BODY MASS INDEX: 22.66 KG/M2 | OXYGEN SATURATION: 97 % | DIASTOLIC BLOOD PRESSURE: 77 MMHG | RESPIRATION RATE: 18 BRPM | TEMPERATURE: 98.1 F | SYSTOLIC BLOOD PRESSURE: 133 MMHG

## 2020-01-08 VITALS
DIASTOLIC BLOOD PRESSURE: 77 MMHG | TEMPERATURE: 98.1 F | HEART RATE: 89 BPM | RESPIRATION RATE: 18 BRPM | BODY MASS INDEX: 22.76 KG/M2 | WEIGHT: 141.6 LBS | HEIGHT: 66 IN | OXYGEN SATURATION: 97 % | SYSTOLIC BLOOD PRESSURE: 133 MMHG

## 2020-01-08 DIAGNOSIS — C50.511 MALIGNANT NEOPLASM OF LOWER-OUTER QUADRANT OF RIGHT BREAST OF FEMALE, ESTROGEN RECEPTOR POSITIVE (HCC): Primary | ICD-10-CM

## 2020-01-08 DIAGNOSIS — Z17.0 MALIGNANT NEOPLASM OF RIGHT BREAST IN FEMALE, ESTROGEN RECEPTOR POSITIVE, UNSPECIFIED SITE OF BREAST (HCC): Primary | ICD-10-CM

## 2020-01-08 DIAGNOSIS — Z78.0 POSTMENOPAUSAL: ICD-10-CM

## 2020-01-08 DIAGNOSIS — R19.7 DIARRHEA, UNSPECIFIED TYPE: ICD-10-CM

## 2020-01-08 DIAGNOSIS — Z79.899 ENCOUNTER FOR MONITORING CARDIOTOXIC DRUG THERAPY: ICD-10-CM

## 2020-01-08 DIAGNOSIS — Z51.81 ENCOUNTER FOR MONITORING CARDIOTOXIC DRUG THERAPY: ICD-10-CM

## 2020-01-08 DIAGNOSIS — C50.911 MALIGNANT NEOPLASM OF RIGHT BREAST IN FEMALE, ESTROGEN RECEPTOR POSITIVE, UNSPECIFIED SITE OF BREAST (HCC): Primary | ICD-10-CM

## 2020-01-08 DIAGNOSIS — M79.601 PAIN OF RIGHT UPPER EXTREMITY: ICD-10-CM

## 2020-01-08 DIAGNOSIS — Z17.0 MALIGNANT NEOPLASM OF LOWER-OUTER QUADRANT OF RIGHT BREAST OF FEMALE, ESTROGEN RECEPTOR POSITIVE (HCC): Primary | ICD-10-CM

## 2020-01-08 PROCEDURE — 96402 CHEMO HORMON ANTINEOPL SQ/IM: CPT

## 2020-01-08 PROCEDURE — 96401 CHEMO ANTI-NEOPL SQ/IM: CPT

## 2020-01-08 PROCEDURE — 74011250636 HC RX REV CODE- 250/636: Performed by: NURSE PRACTITIONER

## 2020-01-08 RX ORDER — EXEMESTANE 25 MG/1
25 TABLET ORAL DAILY
Qty: 90 TAB | Refills: 2 | Status: SHIPPED | OUTPATIENT
Start: 2020-01-08 | End: 2020-01-17 | Stop reason: SDUPTHER

## 2020-01-08 RX ADMIN — TRASTUZUMAB AND HYALURONIDASE-OYSK 600 MG: 600; 10000 INJECTION, SOLUTION SUBCUTANEOUS at 11:12

## 2020-01-08 NOTE — PROGRESS NOTES
[facility] Progress Note    Date: 2020    Name: Marlon Mendes    MRN: [id number]         : 1965    1055. Ms. Danis Delgado Arrived ambulatory and in no distress for C12D1 of Herceptin Hylecta Regimen. Assessment was completed, no acute issues at this time, no new complaints voiced. Patient reports she had her final surgery on 19 and is healing and feeling well. Okay per [facility] Card to administer medication prior to appointment. Chemotherapy Flowsheet 2020   Cycle C13D1   Date 2020   Drug / Regimen Herceptin Hylecta   Pre Meds -   Notes R thigh         Patient Vitals for the past 12 hrs:   Temp Pulse Resp BP SpO2   20 1105 98.1 °F (36.7 °C) 89 18 133/77 97 %       Medications:  Herpcetin Hylecta to R thigh over 5 minutes    1020. Ms. Moctezuma tolerated treatment well and was discharged from [place] [id number] in stable conditionl. She is to return on 20 for her next appointment.     Lucía Sánchez RN  2020

## 2020-01-08 NOTE — PROGRESS NOTES
Santhosh Dang is a 62 y.o. female Follow up for the Evaluation for Breast Cancer. 1. Have you been to the ER, urgent care clinic since your last visit? Hospitalized since your last visit? Last Breast Cancer Surgery done 12/31 at [facility]. 2. Have you seen or consulted any other health care providers outside of the [address] [place] [address] since your last visit? Include any pap smears or colon screening.  No

## 2020-01-08 NOTE — PROGRESS NOTES
[facility] at [address], [address]  [place]: [phone]  F: [phone]      Reason for Visit:   Namita Oden is a 62 y.o. female who is seen in consultation at the request of Dr. Steve Ochoa for evaluation of systemic therapy for breast cancer. Consulting physician:  Dr. 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 Interval history:  In today for follow up. Complains of gr 1 diarrhea, gr 1 hot flashes, gr 1 insomnia, gr 1 cognition/concentration, gr 2 neuropathy.     FH:  Mother with breast cancer at age 45s and 46s; maternal aunt with breast cancer in her 46s; maternal aunt with breast cancer in her 46s; no ovarian, prostate, pancreas cancer    LMP 2018, spotty prior to that    Past Medical History:   Diagnosis Date    Adverse effect of anesthesia     difficulty awakening    Breast cancer ([address]     Right 2018      Past Surgical History:   Procedure Laterality Date    BREAST SURGERY PROCEDURE UNLISTED Left     lumpectomy no lymph    COLONOSCOPY Left 2018    COLONOSCOPY performed by Stacy Heart MD at [place] [address] Right         HX BREAST RECONSTRUCTION Left     10/2019    HX MASTECTOMY Right     2019    HX ORTHOPAEDIC      foot surgery    IR INSERT TUNL CVC W PORT OVER 5 YEARS  4/18/2019    IR REMOVE TUNL CVAD W/O PORT / PUMP  7/19/2019      Social History     Tobacco Use    Smoking status: Former Smoker     Packs/day: 1.00     Years: 20.00     Pack years: 20.00     Types: Cigarettes     Last attempt to quit: 04/2004     Years since quitting: 15.7    Smokeless tobacco: Never Used   Substance Use Topics    Alcohol use: Yes     Alcohol/week: 14.0 standard drinks     Types: 14 Glasses of wine per week      Family History   Problem Relation Age of Onset    Breast Cancer Mother         42's 1st time late 52's 2nd time   [facility] Cancer Mother         breast CA x2    Hypertension Mother     Heart Disease Father     Cancer Maternal Aunt         breast    Cancer Maternal Aunt         breast    Cancer Maternal Cousin         Breast     Current Outpatient Medications   Medication Sig    DULoxetine (CYMBALTA) 30 mg capsule Take 1 Cap by mouth daily.  anastrozole (ARIMIDEX) 1 mg tablet Take 1 mg by mouth daily.  COLLAGEN by Does Not Apply route daily.  ascorbic acid, vitamin C, (VITAMIN C) 500 mg tablet Take 1,000 mg by mouth daily.  TURMERIC PO Take 2 Caps by mouth daily.  ALPRAZolam (XANAX) 0.25 mg tablet Take 0.25 mg by mouth.  spironolactone (ALDACTONE) 25 mg tablet Take  by mouth daily.  vit B Cmplx 3-FA-Vit C-Biotin (NEPHRO VINOD RX) 1- mg-mg-mcg tablet Take 1 Tab by mouth daily.  omega 3-dha-epa-fish oil (FISH OIL) 100-160-1,000 mg cap Take 1 Cap by mouth daily. No current facility-administered medications for this visit. Allergies   Allergen Reactions    Ancef [Cefazolin] Hives    Penicillins Hives        Review of Systems: A complete review of systems was obtained, negative except as described above.     Physical Exam:     Visit Vitals  /77 (BP 1 Location: Left arm, BP Patient Position: Sitting)   Pulse 89   Temp 98.1 °F (36.7 °C) (Temporal)   Resp 18   Ht 5' 6\" (1.676 m)   Wt 141 lb (64 kg)   SpO2 97% BMI 22.76 kg/m²     ECOG PS: 0  General: No distress  Eyes: PERRLA, anicteric sclerae  HENT: Atraumatic, OP clear  Neck: Supple  Lymphatic: No cervical, supraclavicular, or inguinal adenopathy  Respiratory: CTAB, normal respiratory effort  CV: Normal rate, regular rhythm, no murmurs, no peripheral edema  GI: Soft, nontender, nondistended, no masses, no hepatomegaly, no splenomegaly  MS: Normal gait and station. Digits without clubbing or cyanosis. Skin: No rashes, ecchymoses, or petechiae. Normal temperature, turgor, and texture. Psych: Alert, oriented, appropriate affect, normal judgment/insight        Results:     Lab Results   Component Value Date/Time    WBC 3.7 07/10/2019 09:42 AM    HGB 10.9 (L) 07/10/2019 09:42 AM    HCT 31.1 (L) 07/10/2019 09:42 AM    PLATELET 954 (H) 65/12/7628 09:42 AM    .0 (H) 07/10/2019 09:42 AM    ABS. NEUTROPHILS 1.8 07/10/2019 09:42 AM     Lab Results   Component Value Date/Time    Sodium 138 06/26/2019 09:27 AM    Potassium 3.6 06/26/2019 09:27 AM    Chloride 105 06/26/2019 09:27 AM    CO2 28 06/26/2019 09:27 AM    Glucose 91 06/26/2019 09:27 AM    BUN 10 06/26/2019 09:27 AM    Creatinine 0.73 06/26/2019 09:27 AM    GFR est AA >60 06/26/2019 09:27 AM    GFR est non-AA >60 06/26/2019 09:27 AM    Calcium 9.2 06/26/2019 09:27 AM     Lab Results   Component Value Date/Time    Bilirubin, total 0.7 06/26/2019 09:27 AM    ALT (SGPT) 25 06/26/2019 09:27 AM    AST (SGOT) 22 06/26/2019 09:27 AM    Alk. phosphatase 38 (L) 06/26/2019 09:27 AM    Protein, total 6.2 (L) 06/26/2019 09:27 AM    Albumin 3.5 06/26/2019 09:27 AM    Globulin 2.7 06/26/2019 09:27 AM     10/14/19 BUE doppler: negative. Records reviewed and summarized above. Pathology report(s) reviewed above. Radiology report(s) reviewed above. Assessment/plan:   1. Right LOQ IDC, 1.4 cm, gr 2, 0/2 LN, ER +, SC +, HER 2 POSITIVE:  Stage IA (both anatomic and prognostic).   Likely postmenopausal    We explained to the patient that the goal of systemic adjuvant therapy is to improve the chances for cure and decrease the risk of relapse. We explained why a patient can have microscopic cancer spread now even though physical examination, laboratory studies and imaging studies are negative for cancer. We explained that the same treatments used now as adjuvant or preventive treatments rarely if ever are curative in women who develop metastases. [facility] suggests equivalency between q.3 week Adriamycin, Cytoxan followed by weekly paclitaxel and trastuzumab compared with the [facility] regimen. However, this study was not powered to show a difference between these two regimens, and in the [facility] publication, the AC-TH arm showed a numerically advantange (though not statistically significant) to the [facility] arm. In this patient, it is completely reasonable to use [facility] approach and avoid the potential cardiotoxicity of the anthracyclines as well as the potential for leukemia. We discussed the toxicities of docetaxel and carboplatin chemotherapy in detail. This chemotherapy frequently causes a low white blood cell count and hospital admissions for treatment of neutropenic fever. We explained that we consider the use of growth factors to minimize this risk. We explained to the patient that some side effects if they occur only last a few days including nausea, vomiting, stomatitis, arthralgia, myalgia,and allergic reactions to Taxotere. We told the patient that severe nausea and vomiting were uncommon and that some side effects,if they occur, will last longer; this includes hair loss, which will be seen in all patients treated with these agents and fatigue,which will be seen in most.  We also informed that for the patient that heart damage is rare with these agents. We explained that carboplatin can rarely cause kidney damage and high frequency hearing loss.   We provided the patient in detail her information concerning the toxicities of this regimen in addition to her overall discussion. Rationale for therapy with trastuzumab was also discussed with the patient including a 50% proportional improvement in disease free survival and also an improvement in overall survival in patients receiving trastuzumab and chemotherapy for HER-2 positive breast cancer. The side effects of trastuzumab were discussed including a 4%-5% risk of dropping her ejection fraction while on treatment and about a 1% risk of CHF. We discussed that this drug will be used every 3 weeks for remainder of a year following the chemotherapy cycles. We will check her EF before chemotherapy and every 3 months while she is receiving trastuzumab. · TCH (Trastuzumab 8mg/kg load with cycle 1 then 6mg/kg, Docetaxel 75mg/m2, Carboplatin AUC 6) given every 3 weeks x 6 cycles  · Labs: CBC, CMP prior to each treatment  · Antiemetic Prophylaxis: Palonosetron and dexamethasone prior to chemo  · PRN Antiemetics: Ondansetron, Compazine  · Swelling prophylaxis: Dexmethasone 8mg bid the day before, and day after chemotherapy  · TTE prior to chemotherapy and every 12 weeks while on Trastuzumab  · Neulasta 24-72 hours after each treatment    Also discussed the APT study results, weekly paclitaxel 80 mg/m2 x 12 with weekly trastuzumab (4 mg/kg load then 2 mg/kg)  followed by outback trastuzumab to complete one year. 42% were pT1c. I discussed the potential risks of paclitaxel chemotherapy with the patient. Major toxicities include nausea and vomiting, stomatitis, fatigue. We provided the patient with detailed information concerning toxicity including frequent toxicities that only last a few days, such as nausea, vomiting, mouth sores, arthralgia, myalgia, and potentially allergic reactions to paclitaxel, as well as toxicities which can be longer lasting including total alopecia, fatigue, anemia and neuropathy.  We provided the patient with detailed information concerning the toxicities of their regimen in addition to our verbal discussion. This is a reasonable regimen in this setting (only 4 distant recurrences over 7 years in this study, 7 year DFS was 95%)     After this discussion, she is agreeable to paclitaxel and trastuzumab as in APT, will start on 4/22. She has signed informed consent. TTE on 4/11/19, EF 64%, TTE on 7/9/19, EF 64%, TTE on 10/14/19, EF 62%. Next TTE due mid-jan, scheduled for 1/22/2020. Outback herceptin #9/13 today. Port has been removed. Plan to continue with SubQ Herceptin. She will not require XRT. Labs reveal she is postmenopausal.     DEXA on 7/29/19 normal.  Started anastrozole 1 mg daily on 8/1/19. AI holiday started 12/18/19 for 3 weeks for her joint pain. She states her joint pain improved with AI holiday. Plan to switch to exemestane 25 mg daily, rx in. Follow-up after early breast cancer was discussed. I recommend follow-up as defined by the American Society of Clinical Oncology and [facility]. This includes a visit to a health care professional every 3-6 months for 3 years, then every 6-12 months for 2 years, and then yearly as well as mammograms yearly. 2. Emotional well being:  She has excellent support and is coping well with her disease. 3. Diarrhea: Due to chemo. Intermittent. PRN Imodium. Will monitor. Every morning    4. Right arm pain:  Ongoing prior to treatment, intermittent but is improving. May be due to surgery. Previously referred to [facility], PT. She has met with Jacqueline Awad PT and was given exercises, but not helping at this point. Right shoulder XRAY showed arthritis    5. Headaches: Resovled; likely due to therapy/antiemetics    6. Neuropathy:  To both hands, but mostly to right hand. Most likely due to chemo. On cymbalta. Will monitor. 7. Joint pain:  Due to AI. Cymbalta reduced to 30 mg daily during last visit. Symptoms improved with 2 week AI holiday.  Plan to switch exemestane. I appreciate the opportunity to participate in Ms. Jermain Moctezuma's care. Signed By: Ashley Martinez MD      No orders of the defined types were placed in this encounter.

## 2020-01-13 ENCOUNTER — TELEPHONE (OUTPATIENT)
Dept: ONCOLOGY | Age: 55
End: 2020-01-13

## 2020-01-13 NOTE — TELEPHONE ENCOUNTER
Called patient and left a voicemail requesting a return call to inquire if patient would like to be screened for her Exemestane to see if we can get assistance for that or other options. Awaiting return call from patient.

## 2020-01-17 DIAGNOSIS — C50.511 MALIGNANT NEOPLASM OF LOWER-OUTER QUADRANT OF RIGHT BREAST OF FEMALE, ESTROGEN RECEPTOR POSITIVE (HCC): Primary | ICD-10-CM

## 2020-01-17 DIAGNOSIS — Z17.0 MALIGNANT NEOPLASM OF LOWER-OUTER QUADRANT OF RIGHT BREAST OF FEMALE, ESTROGEN RECEPTOR POSITIVE (HCC): Primary | ICD-10-CM

## 2020-01-17 RX ORDER — EXEMESTANE 25 MG/1
25 TABLET ORAL DAILY
Qty: 90 TAB | Refills: 2 | Status: SHIPPED | OUTPATIENT
Start: 2020-01-17 | End: 2020-09-23

## 2020-01-17 NOTE — TELEPHONE ENCOUNTER
Patient returned my call and provided me with her income and household size to see if we can get assistance for the aromasin. Signed up patient for PA[employer] miles and she was approved. Called patient and left a detailed voicemail explaining she was approved for a miles called [employer] and that was submitted to [employer] along with her miles information so she will be hearing from them concerning this. Also that they require some income verification so as soon as possible if she could get that to the office or send to the foundation so they will approve for the year. Awaiting return call from patient.

## 2020-01-23 ENCOUNTER — TELEPHONE (OUTPATIENT)
Dept: ONCOLOGY | Age: 55
End: 2020-01-23

## 2020-01-23 NOTE — TELEPHONE ENCOUNTER
Called patient and left a voicemail to inform her that her Aromasin was sent to [facility] but [facility] ran EOB and they cannot dispense her medication so it was forwarded to [facility] [phone]. Also informed patient she was approved for [facility] miles to help with assistance for the copay of the Aromasin. Requested a return call with any questions or concerns.

## 2020-01-23 NOTE — TELEPHONE ENCOUNTER
I returned Patient's call from her returning my call and let her know that her Echo was normal. She verbalized understanding and had no further question's at that time. She did want me to tell you that she was having a hard time getting in touch with [facility], she stated that everytime she calls she is on hold for like 20 minutes and then ends up having to hang up.       ----- Message from Charles Jain NP sent at 1/22/2020  3:21 PM EST -----  Please let her know this is normal.  Thanks!

## 2020-01-23 NOTE — TELEPHONE ENCOUNTER
I attempted to call the Patient, but she did not answer the phone so I left her a message to call me back along with the number. ----- Message from Oumar Ha NP sent at 1/22/2020  3:21 PM EST -----  Please let her know this is normal.  Thanks!

## 2020-01-29 ENCOUNTER — HOSPITAL ENCOUNTER (OUTPATIENT)
Dept: INFUSION THERAPY | Age: 55
Discharge: HOME | End: 2020-01-29
Payer: COMMERCIAL

## 2020-01-29 VITALS
HEIGHT: 66 IN | RESPIRATION RATE: 18 BRPM | SYSTOLIC BLOOD PRESSURE: 130 MMHG | WEIGHT: 143.1 LBS | BODY MASS INDEX: 23 KG/M2 | TEMPERATURE: 98.4 F | HEART RATE: 84 BPM | DIASTOLIC BLOOD PRESSURE: 81 MMHG

## 2020-01-29 DIAGNOSIS — C50.911 MALIGNANT NEOPLASM OF RIGHT BREAST IN FEMALE, ESTROGEN RECEPTOR POSITIVE, UNSPECIFIED SITE OF BREAST (HCC): Primary | ICD-10-CM

## 2020-01-29 DIAGNOSIS — Z17.0 MALIGNANT NEOPLASM OF RIGHT BREAST IN FEMALE, ESTROGEN RECEPTOR POSITIVE, UNSPECIFIED SITE OF BREAST (HCC): Primary | ICD-10-CM

## 2020-01-29 PROCEDURE — 74011250636 HC RX REV CODE- 250/636: Performed by: NURSE PRACTITIONER

## 2020-01-29 PROCEDURE — 96401 CHEMO ANTI-NEOPL SQ/IM: CPT

## 2020-01-29 RX ADMIN — TRASTUZUMAB AND HYALURONIDASE-OYSK 600 MG: 600; 10000 INJECTION, SOLUTION SUBCUTANEOUS at 10:37

## 2020-01-29 NOTE — PROGRESS NOTES
[facility] VISIT NOTE  Date: 2020    Name: Heri Butterfield    MRN: [id number]         : 1965    1015  Ms. Reynold Holguin Arrived ambulatory and in no distress for C14D1 of Herceptin Hylecta Regimen. Assessment was completed, no acute issues at this time, no new complaints voiced. Chemotherapy Flowsheet 2020   Cycle C14   Date 2020   Drug / Regimen Herceptin Hylecta   Pre Meds given   Notes Left thigh       Vitals:  /81   Pulse 84   Temp 98.4 °F (36.9 °C)   Resp 18   Ht 5' 6\" (1.676 m)   Wt 64.9 kg (143 lb 1.6 oz)   BMI 23.10 kg/m²      Lab results were obtained and reviewed. No results found for this or any previous visit (from the past 12 hour(s)). Medications received:  Medications Administered     trastuzumab-hyaluronidase-oysk (HERCEPTIN HYLECTA) injection 600 mg     Admin Date  2020 Action  Given Dose  600 mg Route  SubCUTAneous Administered By  Mireille Wright RN                Ms. Reynold Holguin tolerated treatment well and was discharged from [place] [address] in stable condition at 1055. She is to return on 2020 at 9:00 for her next appointment.     Sharon Washington RN  2020    Future Appointments:  Future Appointments   Date Time Provider Arron Rodriguez   2020 10:00 AM SS INF3 CH4 <2H [facility]   2020 10:30 AM TERESO Jewell   3/11/2020 10:00 AM SS INF2 CH4 <2H [facility]   2020 10:00 AM SS INF1 CH4 <2H [facility] [address] [place] [address]

## 2020-02-14 RX ORDER — EPINEPHRINE 1 MG/ML
0.3 INJECTION, SOLUTION, CONCENTRATE INTRAVENOUS AS NEEDED
Status: CANCELLED | OUTPATIENT
Start: 2020-02-19

## 2020-02-14 RX ORDER — ONDANSETRON 2 MG/ML
8 INJECTION INTRAMUSCULAR; INTRAVENOUS AS NEEDED
Status: CANCELLED | OUTPATIENT
Start: 2020-04-01

## 2020-02-14 RX ORDER — ACETAMINOPHEN 325 MG/1
650 TABLET ORAL AS NEEDED
Status: CANCELLED
Start: 2020-03-11

## 2020-02-14 RX ORDER — DIPHENHYDRAMINE HYDROCHLORIDE 50 MG/ML
50 INJECTION, SOLUTION INTRAMUSCULAR; INTRAVENOUS AS NEEDED
Status: CANCELLED
Start: 2020-04-01

## 2020-02-14 RX ORDER — ACETAMINOPHEN 325 MG/1
650 TABLET ORAL AS NEEDED
Status: CANCELLED
Start: 2020-02-19

## 2020-02-14 RX ORDER — ONDANSETRON 2 MG/ML
8 INJECTION INTRAMUSCULAR; INTRAVENOUS AS NEEDED
Status: CANCELLED | OUTPATIENT
Start: 2020-02-19

## 2020-02-14 RX ORDER — DIPHENHYDRAMINE HYDROCHLORIDE 50 MG/ML
50 INJECTION, SOLUTION INTRAMUSCULAR; INTRAVENOUS AS NEEDED
Status: CANCELLED
Start: 2020-03-11

## 2020-02-14 RX ORDER — EPINEPHRINE 1 MG/ML
0.3 INJECTION, SOLUTION, CONCENTRATE INTRAVENOUS AS NEEDED
Status: CANCELLED | OUTPATIENT
Start: 2020-03-11

## 2020-02-14 RX ORDER — DIPHENHYDRAMINE HYDROCHLORIDE 50 MG/ML
50 INJECTION, SOLUTION INTRAMUSCULAR; INTRAVENOUS AS NEEDED
Status: CANCELLED
Start: 2020-02-19

## 2020-02-14 RX ORDER — HYDROCORTISONE SODIUM SUCCINATE 100 MG/2ML
100 INJECTION, POWDER, FOR SOLUTION INTRAMUSCULAR; INTRAVENOUS AS NEEDED
Status: CANCELLED | OUTPATIENT
Start: 2020-04-01

## 2020-02-14 RX ORDER — HYDROCORTISONE SODIUM SUCCINATE 100 MG/2ML
100 INJECTION, POWDER, FOR SOLUTION INTRAMUSCULAR; INTRAVENOUS AS NEEDED
Status: CANCELLED | OUTPATIENT
Start: 2020-02-19

## 2020-02-14 RX ORDER — ONDANSETRON 2 MG/ML
8 INJECTION INTRAMUSCULAR; INTRAVENOUS AS NEEDED
Status: CANCELLED | OUTPATIENT
Start: 2020-03-11

## 2020-02-14 RX ORDER — EPINEPHRINE 1 MG/ML
0.3 INJECTION, SOLUTION, CONCENTRATE INTRAVENOUS AS NEEDED
Status: CANCELLED | OUTPATIENT
Start: 2020-04-01

## 2020-02-14 RX ORDER — ACETAMINOPHEN 325 MG/1
650 TABLET ORAL AS NEEDED
Status: CANCELLED
Start: 2020-04-01

## 2020-02-14 RX ORDER — HYDROCORTISONE SODIUM SUCCINATE 100 MG/2ML
100 INJECTION, POWDER, FOR SOLUTION INTRAMUSCULAR; INTRAVENOUS AS NEEDED
Status: CANCELLED | OUTPATIENT
Start: 2020-03-11

## 2020-02-14 RX ORDER — ALBUTEROL SULFATE 0.83 MG/ML
2.5 SOLUTION RESPIRATORY (INHALATION) AS NEEDED
Status: CANCELLED
Start: 2020-02-19

## 2020-02-14 RX ORDER — ALBUTEROL SULFATE 0.83 MG/ML
2.5 SOLUTION RESPIRATORY (INHALATION) AS NEEDED
Status: CANCELLED
Start: 2020-04-01

## 2020-02-14 RX ORDER — ALBUTEROL SULFATE 0.83 MG/ML
2.5 SOLUTION RESPIRATORY (INHALATION) AS NEEDED
Status: CANCELLED
Start: 2020-03-11

## 2020-02-19 ENCOUNTER — OFFICE VISIT (OUTPATIENT)
Dept: ONCOLOGY | Age: 55
End: 2020-02-19

## 2020-02-19 ENCOUNTER — HOSPITAL ENCOUNTER (OUTPATIENT)
Dept: INFUSION THERAPY | Age: 55
Discharge: HOME | End: 2020-02-19
Payer: COMMERCIAL

## 2020-02-19 VITALS
SYSTOLIC BLOOD PRESSURE: 138 MMHG | TEMPERATURE: 97.7 F | DIASTOLIC BLOOD PRESSURE: 70 MMHG | HEART RATE: 90 BPM | OXYGEN SATURATION: 98 % | RESPIRATION RATE: 18 BRPM

## 2020-02-19 VITALS
WEIGHT: 145 LBS | RESPIRATION RATE: 18 BRPM | HEIGHT: 66 IN | OXYGEN SATURATION: 95 % | SYSTOLIC BLOOD PRESSURE: 127 MMHG | TEMPERATURE: 97.8 F | DIASTOLIC BLOOD PRESSURE: 76 MMHG | BODY MASS INDEX: 23.3 KG/M2 | HEART RATE: 82 BPM

## 2020-02-19 DIAGNOSIS — M79.601 PAIN OF RIGHT UPPER EXTREMITY: ICD-10-CM

## 2020-02-19 DIAGNOSIS — R19.7 DIARRHEA, UNSPECIFIED TYPE: ICD-10-CM

## 2020-02-19 DIAGNOSIS — Z51.11 ENCOUNTER FOR ANTINEOPLASTIC CHEMOTHERAPY: ICD-10-CM

## 2020-02-19 DIAGNOSIS — Z17.0 MALIGNANT NEOPLASM OF LOWER-OUTER QUADRANT OF RIGHT BREAST OF FEMALE, ESTROGEN RECEPTOR POSITIVE (HCC): Primary | ICD-10-CM

## 2020-02-19 DIAGNOSIS — Z17.0 MALIGNANT NEOPLASM OF RIGHT BREAST IN FEMALE, ESTROGEN RECEPTOR POSITIVE, UNSPECIFIED SITE OF BREAST (HCC): Primary | ICD-10-CM

## 2020-02-19 DIAGNOSIS — Z79.899 ENCOUNTER FOR MONITORING CARDIOTOXIC DRUG THERAPY: ICD-10-CM

## 2020-02-19 DIAGNOSIS — Z78.0 POSTMENOPAUSAL: ICD-10-CM

## 2020-02-19 DIAGNOSIS — C50.911 MALIGNANT NEOPLASM OF RIGHT BREAST IN FEMALE, ESTROGEN RECEPTOR POSITIVE, UNSPECIFIED SITE OF BREAST (HCC): Primary | ICD-10-CM

## 2020-02-19 DIAGNOSIS — C50.511 MALIGNANT NEOPLASM OF LOWER-OUTER QUADRANT OF RIGHT BREAST OF FEMALE, ESTROGEN RECEPTOR POSITIVE (HCC): Primary | ICD-10-CM

## 2020-02-19 DIAGNOSIS — Z51.81 ENCOUNTER FOR MONITORING CARDIOTOXIC DRUG THERAPY: ICD-10-CM

## 2020-02-19 PROCEDURE — 74011250636 HC RX REV CODE- 250/636: Performed by: NURSE PRACTITIONER

## 2020-02-19 PROCEDURE — 96401 CHEMO ANTI-NEOPL SQ/IM: CPT

## 2020-02-19 RX ADMIN — TRASTUZUMAB AND HYALURONIDASE-OYSK 600 MG: 600; 10000 INJECTION, SOLUTION SUBCUTANEOUS at 12:01

## 2020-02-19 NOTE — PROGRESS NOTES
Savanna Wren is a 62 y.o. female Follow up for the Evaluation for Breast Cancer. 1. Have you been to the ER, urgent care clinic since your last visit? Hospitalized since your last visit? No    2. Have you seen or consulted any other health care providers outside of the [address] [place] [address] since your last visit? Include any pap smears or colon screening.  No

## 2020-02-19 NOTE — PROGRESS NOTES
[facility] [place] at [address], [address] [facility]  Nicolasa Nageotte: [phone]  F: [phone]      Reason for Visit:   Joo Gonzalez is a 62 y.o. female who is seen in consultation at the request of Dr. Zachariah Griggs for evaluation of systemic therapy for breast cancer. Consulting physician:  Dr. Ghazal Hopper    Treatment History:   · 12/10/18 right breast US bx:  IMC, gr 1, 0.12 cm (1.2 mm); insufficient for receptors  · 1/25/19 right breast core bx:  11:00 lobular intraepithelial neoplasia; right breast core bx 7:00:  DCIS, gr 2-3, cribriform, 1.4 cm, ER + at 94%, HI + at 54%  · 2/28/19 right mastectomy : LOQ IDC, 1.4 cm, ER + at 89%, HI + at 78%, HER 2 POSITIVE (IHC 2+, FISH ratio 3.3; sig/cell 9.2) ; ki67 22%, gr 2, DCIS present and extensive, 0/2 LN; pT1c pN0 cM0  · Myriad Myrisk negative  · TH 4/22/19-7/10/19  · Outback Herceptin 7/17/19-  · Anastrozole-8/1/19-12/18/19  · Exemestane 1/8/2020-      History of Present Illness: An abnormal mammogram 11/2018 led to the pathology above. Interval history:  In today for follow up and treatment. Complains of gr 1 diarrhea, gr 1 hot flashes, gr 1 1 insomnia, gr 1 cognition, gr 1 concentration, gr 1 neuropathy.      FH:  Mother with breast cancer at age 45s and 46s; maternal aunt with breast cancer in her 46s; maternal aunt with breast cancer in her 46s; no ovarian, prostate, pancreas cancer    LMP 11/2018, spotty prior to that    Past Medical History:   Diagnosis Date    Adverse effect of anesthesia 1995    difficulty awakening    Breast cancer ([address]     Right 11/2018      Past Surgical History:   Procedure Laterality Date    BREAST SURGERY PROCEDURE UNLISTED Left 1997    lumpectomy no lymph    COLONOSCOPY Left 12/14/2018    COLONOSCOPY performed by Chava Mejía MD at [place] [address] Right     12/18    HX BREAST RECONSTRUCTION Left     10/2019    HX MASTECTOMY Right     02/2019    HX ORTHOPAEDIC foot surgery    IR INSERT TUNL CVC W PORT OVER 5 YEARS  4/18/2019    IR REMOVE TUNL CVAD W/O PORT / PUMP  7/19/2019      Social History     Tobacco Use    Smoking status: Former Smoker     Packs/day: 1.00     Years: 20.00     Pack years: 20.00     Types: Cigarettes     Last attempt to quit: 04/2004     Years since quitting: 15.8    Smokeless tobacco: Never Used   Substance Use Topics    Alcohol use: Yes     Alcohol/week: 14.0 standard drinks     Types: 14 Glasses of wine per week      Family History   Problem Relation Age of Onset    Breast Cancer Mother         42's 1st time late 52's 2nd time   [place] [facility] Mother         breast CA x2    Hypertension Mother     Heart Disease Father     Cancer Maternal Aunt         breast    Cancer Maternal Aunt         breast    Cancer Maternal Cousin         Breast     Current Outpatient Medications   Medication Sig    exemestane (AROMASIN) 25 mg tablet Take 1 Tab by mouth daily.  DULoxetine (CYMBALTA) 30 mg capsule Take 1 Cap by mouth daily.  COLLAGEN by Does Not Apply route daily.  ascorbic acid, vitamin C, (VITAMIN C) 500 mg tablet Take 1,000 mg by mouth daily.  TURMERIC PO Take 2 Caps by mouth daily.  ALPRAZolam (XANAX) 0.25 mg tablet Take 0.25 mg by mouth.  spironolactone (ALDACTONE) 25 mg tablet Take  by mouth daily.  vit B Cmplx 3-FA-Vit C-Biotin (NEPHRO VINOD RX) 1- mg-mg-mcg tablet Take 1 Tab by mouth daily.  omega 3-dha-epa-fish oil (FISH OIL) 100-160-1,000 mg cap Take 1 Cap by mouth daily. No current facility-administered medications for this visit. Allergies   Allergen Reactions    Ancef [Cefazolin] Hives    Penicillins Hives        Review of Systems: A complete review of systems was obtained, negative except as described above.     Physical Exam:     Visit Vitals  /76 (BP 1 Location: Left arm, BP Patient Position: Sitting)   Pulse 82   Temp 97.8 °F (36.6 °C) (Temporal)   Resp 18   Ht 5' 6\" (1.676 m)   Wt 145 lb (65.8 kg)   SpO2 95%   BMI 23.40 kg/m²     ECOG PS: 0  General: No distress  Eyes: PERRLA, anicteric sclerae  HENT: Atraumatic, OP clear  Neck: Supple  Lymphatic: No cervical, supraclavicular, or inguinal adenopathy  Respiratory: CTAB, normal respiratory effort  CV: Normal rate, regular rhythm, no murmurs, no peripheral edema  GI: Soft, nontender, nondistended, no masses, no hepatomegaly, no splenomegaly  MS: Normal gait and station. Digits without clubbing or cyanosis. Skin: No rashes, ecchymoses, or petechiae. Normal temperature, turgor, and texture. Psych: Alert, oriented, appropriate affect, normal judgment/insight        Results:     Lab Results   Component Value Date/Time    WBC 3.7 07/10/2019 09:42 AM    HGB 10.9 (L) 07/10/2019 09:42 AM    HCT 31.1 (L) 07/10/2019 09:42 AM    PLATELET 765 (H) 16/63/0682 09:42 AM    .0 (H) 07/10/2019 09:42 AM    ABS. NEUTROPHILS 1.8 07/10/2019 09:42 AM     Lab Results   Component Value Date/Time    Sodium 138 06/26/2019 09:27 AM    Potassium 3.6 06/26/2019 09:27 AM    Chloride 105 06/26/2019 09:27 AM    CO2 28 06/26/2019 09:27 AM    Glucose 91 06/26/2019 09:27 AM    BUN 10 06/26/2019 09:27 AM    Creatinine 0.73 06/26/2019 09:27 AM    GFR est AA >60 06/26/2019 09:27 AM    GFR est non-AA >60 06/26/2019 09:27 AM    Calcium 9.2 06/26/2019 09:27 AM     Lab Results   Component Value Date/Time    Bilirubin, total 0.7 06/26/2019 09:27 AM    ALT (SGPT) 25 06/26/2019 09:27 AM    AST (SGOT) 22 06/26/2019 09:27 AM    Alk. phosphatase 38 (L) 06/26/2019 09:27 AM    Protein, total 6.2 (L) 06/26/2019 09:27 AM    Albumin 3.5 06/26/2019 09:27 AM    Globulin 2.7 06/26/2019 09:27 AM     10/14/19 BUE doppler: negative. Records reviewed and summarized above. Pathology report(s) reviewed above. Radiology report(s) reviewed above. Assessment/plan:   1. Right LOQ IDC, 1.4 cm, gr 2, 0/2 LN, ER +, IA +, HER 2 POSITIVE:  Stage IA (both anatomic and prognostic).   Likely postmenopausal    We explained to the patient that the goal of systemic adjuvant therapy is to improve the chances for cure and decrease the risk of relapse. We explained why a patient can have microscopic cancer spread now even though physical examination, laboratory studies and imaging studies are negative for cancer. We explained that the same treatments used now as adjuvant or preventive treatments rarely if ever are curative in women who develop metastases. · TCH (Trastuzumab 8mg/kg load with cycle 1 then 6mg/kg, Docetaxel 75mg/m2, Carboplatin AUC 6) given every 3 weeks x 6 cycles  · Labs: CBC, CMP prior to each treatment  · Antiemetic Prophylaxis: Palonosetron and dexamethasone prior to chemo  · PRN Antiemetics: Ondansetron, Compazine  · Swelling prophylaxis: Dexmethasone 8mg bid the day before, and day after chemotherapy  · TTE prior to chemotherapy and every 12 weeks while on Trastuzumab  · Neulasta 24-72 hours after each treatment    Also discussed the APT study results, weekly paclitaxel 80 mg/m2 x 12 with weekly trastuzumab (4 mg/kg load then 2 mg/kg)  followed by outback trastuzumab to complete one year. 42% were pT1c. I discussed the potential risks of paclitaxel chemotherapy with the patient. Major toxicities include nausea and vomiting, stomatitis, fatigue. We provided the patient with detailed information concerning toxicity including frequent toxicities that only last a few days, such as nausea, vomiting, mouth sores, arthralgia, myalgia, and potentially allergic reactions to paclitaxel, as well as toxicities which can be longer lasting including total alopecia, fatigue, anemia and neuropathy. We provided the patient with detailed information concerning the toxicities of their regimen in addition to our verbal discussion.     This is a reasonable regimen in this setting (only 4 distant recurrences over 7 years in this study, 7 year DFS was 95%)     After this discussion, she is agreeable to paclitaxel and trastuzumab as in APT, will start on 4/22. She has signed informed consent. TTE on 4/11/19, EF 64%, TTE on 7/9/19, EF 64%, TTE on 10/14/19, EF 62%. TTE on 1/22/2020, EF 60%    Outback herceptin #11/13 today. Port has been removed. Plan to continue with SubQ Herceptin. She will not require XRT. Labs reveal she is postmenopausal.     DEXA on 7/29/19 normal.  Started anastrozole 1 mg daily on 8/1/19. AI holiday started 12/18/19 for 3 weeks for her joint pain. She states her joint pain improved with AI holiday. Now on exemestane 25 mg daily, continue    Follow-up after early breast cancer was discussed. I recommend follow-up as defined by the American Society of Clinical Oncology and [facility]. This includes a visit to a health care professional every 3-6 months for 3 years, then every 6-12 months for 2 years, and then yearly as well as mammograms yearly. 2. Emotional well being:  She has excellent support and is coping well with her disease. 3. Diarrhea: Due to chemo. Intermittent. PRN Imodium. Will monitor. Every morning    4. Right arm pain:  Ongoing prior to treatment, intermittent but is improving. May be due to surgery. Previously referred to [facility], PT. She has met with Madelin Gonzalez PT and was given exercises, but not helping at this point. Right shoulder XRAY showed arthritis    5. Headaches: Resovled; likely due to therapy/antiemetics    6. Neuropathy:  To both hands, but mostly to right hand. Most likely due to chemo. On cymbalta. Will monitor. 7. Joint pain:  Due to AI. Cymbalta reduced to 30 mg daily during last visit. Symptoms improved with 2 week AI holiday. Better on exemestane. I appreciate the opportunity to participate in Ms. Keisha Moctezuma's care. Signed By: Chandler Howard MD      No orders of the defined types were placed in this encounter.

## 2020-02-19 NOTE — PROGRESS NOTES
[facility] Progress Note    Date: 2020    Name: Jose Holloway    MRN: [id number]         : 1965    Ms. Iqra Mello Arrived ambulatory and in no distress for C15D1 of Herceptin Hylecta Regimen. Assessment was completed, no acute issues at this time, no new complaints voiced. Chemotherapy Flowsheet 2020   Cycle C15   Date 2020   Drug / Regimen Herceptin Hylecta   Pre Meds given    Notes R thigh     Ms. Moctezuma's vitals were reviewed. Visit Vitals  /70 (BP 1 Location: Left arm, BP Patient Position: Sitting)   Pulse 90   Temp 97.7 °F (36.5 °C)   Resp 18   SpO2 98%       Medications:  Medications Administered     trastuzumab-hyaluronidase-oysk (HERCEPTIN HYLECTA) injection 600 mg     Admin Date  2020 Action  Given Dose  600 mg Route  SubCUTAneous Administered By  Earline Abernathy RN            Injection given in right thigh Sub Q.      Ms. Iqra Mello tolerated treatment well and was discharged from [place] [address] in stable condition. She is to return on 3/11/20 for her next appointment.     Alf Beard RN  2020

## 2020-02-28 DIAGNOSIS — C50.511 MALIGNANT NEOPLASM OF LOWER-OUTER QUADRANT OF RIGHT BREAST OF FEMALE, ESTROGEN RECEPTOR POSITIVE (HCC): Primary | ICD-10-CM

## 2020-02-28 DIAGNOSIS — Z17.0 MALIGNANT NEOPLASM OF LOWER-OUTER QUADRANT OF RIGHT BREAST OF FEMALE, ESTROGEN RECEPTOR POSITIVE (HCC): Primary | ICD-10-CM

## 2020-02-28 RX ORDER — DULOXETIN HYDROCHLORIDE 30 MG/1
CAPSULE, DELAYED RELEASE ORAL
Qty: 90 CAP | Refills: 2 | Status: SHIPPED | OUTPATIENT
Start: 2020-02-28 | End: 2020-04-30 | Stop reason: SDUPTHER

## 2020-03-11 ENCOUNTER — HOSPITAL ENCOUNTER (OUTPATIENT)
Dept: INFUSION THERAPY | Age: 55
Discharge: HOME | End: 2020-03-11
Payer: COMMERCIAL

## 2020-03-11 VITALS
OXYGEN SATURATION: 95 % | SYSTOLIC BLOOD PRESSURE: 131 MMHG | WEIGHT: 144.5 LBS | RESPIRATION RATE: 18 BRPM | HEART RATE: 101 BPM | TEMPERATURE: 98 F | BODY MASS INDEX: 23.32 KG/M2 | DIASTOLIC BLOOD PRESSURE: 59 MMHG

## 2020-03-11 DIAGNOSIS — C50.911 MALIGNANT NEOPLASM OF RIGHT BREAST IN FEMALE, ESTROGEN RECEPTOR POSITIVE, UNSPECIFIED SITE OF BREAST (HCC): Primary | ICD-10-CM

## 2020-03-11 DIAGNOSIS — Z17.0 MALIGNANT NEOPLASM OF RIGHT BREAST IN FEMALE, ESTROGEN RECEPTOR POSITIVE, UNSPECIFIED SITE OF BREAST (HCC): Primary | ICD-10-CM

## 2020-03-11 PROCEDURE — 74011250636 HC RX REV CODE- 250/636: Performed by: NURSE PRACTITIONER

## 2020-03-11 PROCEDURE — 96401 CHEMO ANTI-NEOPL SQ/IM: CPT

## 2020-03-11 RX ADMIN — TRASTUZUMAB AND HYALURONIDASE-OYSK 600 MG: 600; 10000 INJECTION, SOLUTION SUBCUTANEOUS at 10:42

## 2020-03-11 NOTE — PROGRESS NOTES
[facility] Progress Note    Date: 2020    Name: Adina Orantes    MRN: [id number]         : 1965    Ms. Bakari Harris Arrived ambulatory and in no distress for cycle16 day 1 of herceptin Hylecta regimen. Assessment was completed, no acute issues at this time, no new complaints voiced. Chemotherapy Flowsheet 3/11/2020   Cycle C16   Date 3/11/2020   Drug / Regimen Herceptin Hylecta   Pre Meds given   Notes L thigh         Ms. Moctezuma's vitals were reviewed. Visit Vitals  /59   Pulse (!) 101   Temp 98 °F (36.7 °C)   Resp 18   SpO2 95%         Pre-medications  were administered as ordered and chemotherapy was initiated. Administered in L thigh. Medications Administered     trastuzumab-hyaluronidase-oysk (HERCEPTIN HYLECTA) injection 600 mg     Admin Date  2020 Action  Given Dose  600 mg Route  SubCUTAneous Administered By  Marylen Carrier                  Ms. Bakari Harris tolerated treatment well and was discharged from [place] [address] in stable condition. She is to return on  for her next appointment.     [facility]  2020

## 2020-04-01 ENCOUNTER — OFFICE VISIT (OUTPATIENT)
Dept: ONCOLOGY | Age: 55
End: 2020-04-01

## 2020-04-01 ENCOUNTER — HOSPITAL ENCOUNTER (OUTPATIENT)
Dept: INFUSION THERAPY | Age: 55
Discharge: HOME | End: 2020-04-01
Payer: COMMERCIAL

## 2020-04-01 VITALS
OXYGEN SATURATION: 98 % | DIASTOLIC BLOOD PRESSURE: 86 MMHG | SYSTOLIC BLOOD PRESSURE: 125 MMHG | HEART RATE: 87 BPM | RESPIRATION RATE: 18 BRPM | SYSTOLIC BLOOD PRESSURE: 125 MMHG | WEIGHT: 147 LBS | OXYGEN SATURATION: 98 % | BODY MASS INDEX: 23.82 KG/M2 | RESPIRATION RATE: 16 BRPM | BODY MASS INDEX: 23.63 KG/M2 | HEART RATE: 87 BPM | WEIGHT: 147.6 LBS | DIASTOLIC BLOOD PRESSURE: 86 MMHG | TEMPERATURE: 98.3 F | TEMPERATURE: 98.3 F | HEIGHT: 66 IN

## 2020-04-01 DIAGNOSIS — Z17.0 MALIGNANT NEOPLASM OF RIGHT BREAST IN FEMALE, ESTROGEN RECEPTOR POSITIVE, UNSPECIFIED SITE OF BREAST (HCC): Primary | ICD-10-CM

## 2020-04-01 DIAGNOSIS — Z17.0 MALIGNANT NEOPLASM OF LOWER-OUTER QUADRANT OF RIGHT BREAST OF FEMALE, ESTROGEN RECEPTOR POSITIVE (HCC): Primary | ICD-10-CM

## 2020-04-01 DIAGNOSIS — C50.911 MALIGNANT NEOPLASM OF RIGHT BREAST IN FEMALE, ESTROGEN RECEPTOR POSITIVE, UNSPECIFIED SITE OF BREAST (HCC): Primary | ICD-10-CM

## 2020-04-01 DIAGNOSIS — C50.511 MALIGNANT NEOPLASM OF LOWER-OUTER QUADRANT OF RIGHT BREAST OF FEMALE, ESTROGEN RECEPTOR POSITIVE (HCC): Primary | ICD-10-CM

## 2020-04-01 PROCEDURE — 74011250636 HC RX REV CODE- 250/636: Performed by: NURSE PRACTITIONER

## 2020-04-01 PROCEDURE — 96401 CHEMO ANTI-NEOPL SQ/IM: CPT

## 2020-04-01 RX ADMIN — TRASTUZUMAB AND HYALURONIDASE-OYSK 600 MG: 600; 10000 INJECTION, SOLUTION SUBCUTANEOUS at 10:55

## 2020-04-01 NOTE — PROGRESS NOTES
[facility] VISIT NOTE    1010. Pt arrived at [facility] ambulatory and in no distress for C17D1 Herceptin Hylecta. Assessment completed, pt c/o continued nasal drainage and tearing in her eyes. Received ok to treat prior to apt from Alfred Figueroa NP. Medications received:  Herceptin Hyclecta - SQ - Right Thigh    Tolerated treatment well, no adverse reaction noted. Patient Vitals for the past 12 hrs:   Temp Pulse Resp BP SpO2   04/01/20 1014 98.3 °F (36.8 °C) 87 18 125/86 98 %     1100. D/C'd from [facility] ambulatory and in no distress.

## 2020-04-01 NOTE — PROGRESS NOTES
[facility] [place] at [place] [address] [facility], [address] [facility]  Aletha Mix: [phone]  F: [phone]      Reason for Visit:   Cherelle Woodruff is a 62 y.o. female who is seen in consultation at the request of Dr. León Desouza for evaluation of systemic therapy for breast cancer. Consulting physician:  Dr. Toño Yeh    Treatment History:   · 12/10/18 right breast US bx:  IMC, gr 1, 0.12 cm (1.2 mm); insufficient for receptors  · 1/25/19 right breast core bx:  11:00 lobular intraepithelial neoplasia; right breast core bx 7:00:  DCIS, gr 2-3, cribriform, 1.4 cm, ER + at 94%, [place] + at 54%  · 2/28/19 right mastectomy : LOQ IDC, 1.4 cm, ER + at 89%, [place] + at 78%, HER 2 POSITIVE (IHC 2+, FISH ratio 3.3; sig/cell 9.2) ; ki67 22%, gr 2, DCIS present and extensive, 0/2 LN; pT1c pN0 cM0  · Myriad Myrisk negative  · TH 4/22/19-7/10/19  · Outback Herceptin 7/17/19-4/1/20  · Anastrozole-8/1/19-12/18/19  · Exemestane 1/8/2020-    History of Present Illness: An abnormal mammogram 11/2018 led to the pathology above. Interval history:  In today for follow up and treatment. Complains of gr 1 diarrhea, gr 1 hot flashes, gr 1 1 insomnia, gr 1 cognition, gr 1 concentration, gr 1 neuropathy.      FH:  Mother with breast cancer at age 45s and 46s; maternal aunt with breast cancer in her 46s; maternal aunt with breast cancer in her 46s; no ovarian, prostate, pancreas cancer    LMP 11/2018, spotty prior to that    Past Medical History:   Diagnosis Date    Adverse effect of anesthesia 1995    difficulty awakening    [facility] [address] [address]     Right 11/2018      Past Surgical History:   Procedure Laterality Date    BREAST SURGERY PROCEDURE UNLISTED Left 1997    lumpectomy no lymph    COLONOSCOPY Left 12/14/2018    COLONOSCOPY performed by Nitin Villareal MD at [address] Right     12/18    HX BREAST RECONSTRUCTION Left     10/2019    HX MASTECTOMY Right     02/2019    HX ORTHOPAEDIC foot surgery    IR INSERT TUNL CVC W PORT OVER 5 YEARS  2019    IR REMOVE TUNL CVAD W/O PORT / PUMP  2019      Social History     Tobacco Use    Smoking status: Former Smoker     Packs/day: 1.00     Years: 20.00     Pack years: 20.00     Types: Cigarettes     Last attempt to quit: 2004     Years since quittin.0    Smokeless tobacco: Never Used   Substance Use Topics    Alcohol use: Yes     Alcohol/week: 14.0 standard drinks     Types: 14 Glasses of wine per week      Family History   Problem Relation Age of Onset    Breast Cancer Mother         42's 1st time late 52's 2nd time   [place] Cancer Mother         breast CA x2    Hypertension Mother     Heart Disease Father     Cancer Maternal Aunt         breast    Cancer Maternal Aunt         breast    Cancer Maternal Cousin         Breast     Current Outpatient Medications   Medication Sig    COQ10, UBIQUINOL, PO Take 10 mL by mouth daily.  DULoxetine (CYMBALTA) 30 mg capsule TAKE 1 CAPSULE BY MOUTH ONE TIME DAILY    exemestane (AROMASIN) 25 mg tablet Take 1 Tab by mouth daily.  COLLAGEN by Does Not Apply route daily.  ascorbic acid, vitamin C, (VITAMIN C) 500 mg tablet Take 1,000 mg by mouth daily.  TURMERIC PO Take 2 Caps by mouth daily.  ALPRAZolam (XANAX) 0.25 mg tablet Take 0.25 mg by mouth.  spironolactone (ALDACTONE) 25 mg tablet Take  by mouth daily.  vit B Cmplx 3-FA-Vit C-Biotin (NEPHRO VINOD RX) 1- mg-mg-mcg tablet Take 1 Tab by mouth daily.  omega 3-dha-epa-fish oil (FISH OIL) 100-160-1,000 mg cap Take 1 Cap by mouth daily. No current facility-administered medications for this visit. Allergies   Allergen Reactions    Ancef [Cefazolin] Hives    Penicillins Hives        Review of Systems: A complete review of systems was obtained, negative except as described above.     Physical Exam:     Visit Vitals  /86   Pulse 87   Temp 98.3 °F (36.8 °C) (Oral)   Resp 16   Ht 5' 6\" (1.676 m)   BMI 23.82 kg/m²     ECOG PS: 0  General: No distress  Eyes: PERRLA, anicteric sclerae  HENT: Atraumatic, OP clear  Neck: Supple  Respiratory: normal respiratory effort  CV: Normal rate, no peripheral edema  MS: Normal gait and station. Digits without clubbing or cyanosis. Skin: No rashes, ecchymoses, or petechiae. Normal temperature, turgor, and texture. Psych: Alert, oriented, appropriate affect, normal judgment/insight        Results:     Lab Results   Component Value Date/Time    WBC 3.7 07/10/2019 09:42 AM    HGB 10.9 (L) 07/10/2019 09:42 AM    HCT 31.1 (L) 07/10/2019 09:42 AM    PLATELET 988 (H) 51/61/7415 09:42 AM    .0 (H) 07/10/2019 09:42 AM    ABS. NEUTROPHILS 1.8 07/10/2019 09:42 AM     Lab Results   Component Value Date/Time    Sodium 138 06/26/2019 09:27 AM    Potassium 3.6 06/26/2019 09:27 AM    Chloride 105 06/26/2019 09:27 AM    CO2 28 06/26/2019 09:27 AM    Glucose 91 06/26/2019 09:27 AM    BUN 10 06/26/2019 09:27 AM    Creatinine 0.73 06/26/2019 09:27 AM    GFR est AA >60 06/26/2019 09:27 AM    GFR est non-AA >60 06/26/2019 09:27 AM    Calcium 9.2 06/26/2019 09:27 AM     Lab Results   Component Value Date/Time    Bilirubin, total 0.7 06/26/2019 09:27 AM    ALT (SGPT) 25 06/26/2019 09:27 AM    AST (SGOT) 22 06/26/2019 09:27 AM    Alk. phosphatase 38 (L) 06/26/2019 09:27 AM    Protein, total 6.2 (L) 06/26/2019 09:27 AM    Albumin 3.5 06/26/2019 09:27 AM    Globulin 2.7 06/26/2019 09:27 AM     10/14/19 BUE doppler: negative. Records reviewed and summarized above. Pathology report(s) reviewed above. Radiology report(s) reviewed above. Assessment/plan:   1. Right LOQ IDC, 1.4 cm, gr 2, 0/2 LN, ER +, [place] +, HER 2 POSITIVE:  Stage IA (both anatomic and prognostic). Likely postmenopausal    We explained to the patient that the goal of systemic adjuvant therapy is to improve the chances for cure and decrease the risk of relapse.  We explained why a patient can have microscopic cancer spread now even though physical examination, laboratory studies and imaging studies are negative for cancer. We explained that the same treatments used now as adjuvant or preventive treatments rarely if ever are curative in women who develop metastases. · TCH (Trastuzumab 8mg/kg load with cycle 1 then 6mg/kg, Docetaxel 75mg/m2, Carboplatin AUC 6) given every 3 weeks x 6 cycles  · Labs: CBC, CMP prior to each treatment  · Antiemetic Prophylaxis: Palonosetron and dexamethasone prior to chemo  · PRN Antiemetics: Ondansetron, Compazine  · Swelling prophylaxis: Dexmethasone 8mg bid the day before, and day after chemotherapy  · TTE prior to chemotherapy and every 12 weeks while on Trastuzumab  · Neulasta 24-72 hours after each treatment    Also discussed the APT study results, weekly paclitaxel 80 mg/m2 x 12 with weekly trastuzumab (4 mg/kg load then 2 mg/kg)  followed by outback trastuzumab to complete one year. 42% were pT1c. I discussed the potential risks of paclitaxel chemotherapy with the patient. Major toxicities include nausea and vomiting, stomatitis, fatigue. We provided the patient with detailed information concerning toxicity including frequent toxicities that only last a few days, such as nausea, vomiting, mouth sores, arthralgia, myalgia, and potentially allergic reactions to paclitaxel, as well as toxicities which can be longer lasting including total alopecia, fatigue, anemia and neuropathy. We provided the patient with detailed information concerning the toxicities of their regimen in addition to our verbal discussion. This is a reasonable regimen in this setting (only 4 distant recurrences over 7 years in this study, 7 year DFS was 95%)     After this discussion, she is agreeable to paclitaxel and trastuzumab as in APT, will start on 4/22. She has signed informed consent. TTE on 4/11/19, EF 64%, TTE on 7/9/19, EF 64%, TTE on 10/14/19, EF 62%. TTE on 1/22/2020, EF 60%.   Will get another TTE in 6-7 months    Outback herceptin #13/13 today. Port has been removed. Plan to continue with SubQ Herceptin. She will not require XRT. Labs reveal she is postmenopausal.     DEXA on 7/29/19 normal.  Started anastrozole 1 mg daily on 8/1/19. AI holiday started 12/18/19 for 3 weeks for her joint pain. She states her joint pain improved with AI holiday. Now on exemestane 25 mg daily, continue    Follow-up after early breast cancer was discussed. I recommend follow-up as defined by the American Society of Clinical Oncology and [facility]. This includes a visit to a health care professional every 3-6 months for 3 years, then every 6-12 months for 2 years, and then yearly as well as mammograms yearly. 2. Emotional well being:  She has excellent support and is coping well with her disease. 3. Diarrhea: Due to chemo. Intermittent. PRN Imodium. Will monitor. Every morning    4. Right arm pain:  Ongoing prior to treatment, intermittent but is improving. May be due to surgery. Previously referred to [facility], PT. She has met with Amanda Juarez PT and was given exercises, but not helping at this point. Right shoulder XRAY showed arthritis    5. Headaches: Resovled; likely due to therapy/antiemetics    6. Neuropathy:  To both hands, but mostly to right hand. Most likely due to chemo. On cymbalta. Will monitor. 7. Joint pain:  Due to AI. Symptoms improved with 2 week AI holiday. Better on exemestane. I appreciate the opportunity to participate in Ms. Evan Moctezuma's care. Signed By: Solo Diehl MD      No orders of the defined types were placed in this encounter.

## 2020-04-30 ENCOUNTER — TELEPHONE (OUTPATIENT)
Dept: ONCOLOGY | Age: 55
End: 2020-04-30

## 2020-04-30 DIAGNOSIS — C50.511 MALIGNANT NEOPLASM OF LOWER-OUTER QUADRANT OF RIGHT BREAST OF FEMALE, ESTROGEN RECEPTOR POSITIVE (HCC): ICD-10-CM

## 2020-04-30 DIAGNOSIS — Z17.0 MALIGNANT NEOPLASM OF LOWER-OUTER QUADRANT OF RIGHT BREAST OF FEMALE, ESTROGEN RECEPTOR POSITIVE (HCC): ICD-10-CM

## 2020-04-30 RX ORDER — DULOXETIN HYDROCHLORIDE 60 MG/1
60 CAPSULE, DELAYED RELEASE ORAL DAILY
Qty: 90 CAP | Refills: 1 | Status: SHIPPED | OUTPATIENT
Start: 2020-04-30 | End: 2020-11-18 | Stop reason: DRUGHIGH

## 2020-04-30 RX ORDER — GABAPENTIN 300 MG/1
300 CAPSULE ORAL
Qty: 30 CAP | Refills: 1 | Status: SHIPPED | OUTPATIENT
Start: 2020-04-30

## 2020-04-30 RX ORDER — DULOXETIN HYDROCHLORIDE 30 MG/1
CAPSULE, DELAYED RELEASE ORAL
Qty: 90 CAP | Refills: 2 | Status: SHIPPED | OUTPATIENT
Start: 2020-04-30 | End: 2020-04-30

## 2020-04-30 NOTE — TELEPHONE ENCOUNTER
Patient called and wanted to know if there was a different type of medication that she would try to help with her hands she stated that the one that she is on now is not helping.   [phone]

## 2020-04-30 NOTE — TELEPHONE ENCOUNTER
I attempted to call the Patient but she did not answer the phone, so I left her a message to call me back along with the phone number.

## 2020-04-30 NOTE — TELEPHONE ENCOUNTER
I attempted to call the patient back, as she had returned my call from earlier but she did not answer the phone. I left her a message stating to call me back along with the phone number.

## 2020-05-04 NOTE — TELEPHONE ENCOUNTER
I called and spoke with the Patient and she stated that she was told by Mindy Galvin to take Cymbalta 60mg so she had been taking two of the 30mg tablets. She stated that she had not picked up the Gabapentin yet because the line at [facility] was so long on Friday and she was also not sure if we had sent it in yet or not. I confirmed to her that we did send it in and it was for 30 days with 1 Refill just to see if it works or not for her, and that if it makes her too drowsy that per Johnie we can lower the dosage. I told her to let us know how she is doing once she starts taking it and if she has any issues. She verbalized understanding and had no further question's at that time.

## 2020-05-05 ENCOUNTER — TELEPHONE (OUTPATIENT)
Dept: ONCOLOGY | Age: 55
End: 2020-05-05

## 2020-05-05 ENCOUNTER — PATIENT MESSAGE (OUTPATIENT)
Dept: ONCOLOGY | Age: 55
End: 2020-05-05

## 2020-05-05 NOTE — TELEPHONE ENCOUNTER
Patient called and stated that would like to know if she can go and see a neuropathy person to rule that out since her sister thinks that she has carpal tunnel.   [phone]

## 2020-05-06 DIAGNOSIS — C50.511 MALIGNANT NEOPLASM OF LOWER-OUTER QUADRANT OF RIGHT BREAST OF FEMALE, ESTROGEN RECEPTOR POSITIVE (HCC): Primary | ICD-10-CM

## 2020-05-06 DIAGNOSIS — Z17.0 MALIGNANT NEOPLASM OF LOWER-OUTER QUADRANT OF RIGHT BREAST OF FEMALE, ESTROGEN RECEPTOR POSITIVE (HCC): Primary | ICD-10-CM

## 2020-05-06 DIAGNOSIS — M79.601 PAIN OF RIGHT UPPER EXTREMITY: ICD-10-CM

## 2020-09-23 DIAGNOSIS — C50.511 MALIGNANT NEOPLASM OF LOWER-OUTER QUADRANT OF RIGHT BREAST OF FEMALE, ESTROGEN RECEPTOR POSITIVE (HCC): ICD-10-CM

## 2020-09-23 DIAGNOSIS — Z17.0 MALIGNANT NEOPLASM OF LOWER-OUTER QUADRANT OF RIGHT BREAST OF FEMALE, ESTROGEN RECEPTOR POSITIVE (HCC): ICD-10-CM

## 2020-09-23 RX ORDER — EXEMESTANE 25 MG/1
TABLET ORAL
Qty: 90 TAB | Refills: 2 | Status: SHIPPED | OUTPATIENT
Start: 2020-09-23 | End: 2021-12-09

## 2020-10-02 ENCOUNTER — TELEPHONE (OUTPATIENT)
Dept: ONCOLOGY | Age: 55
End: 2020-10-02

## 2020-10-02 NOTE — TELEPHONE ENCOUNTER
LVM for patient about appointment and need to move patients appointment since Dr. Renee Jin is out of the office the week of October 5.   Can be a virtual. Move to the end of November/ Dec

## 2020-10-05 ENCOUNTER — TELEPHONE (OUTPATIENT)
Dept: ONCOLOGY | Age: 55
End: 2020-10-05

## 2020-10-05 NOTE — TELEPHONE ENCOUNTER
LVM for patient letting her know that we canceled her appointment and she needs to call back and make appointment for Nov/Dec

## 2020-11-18 ENCOUNTER — DOCUMENTATION ONLY (OUTPATIENT)
Dept: ONCOLOGY | Age: 55
End: 2020-11-18

## 2020-11-18 DIAGNOSIS — Z17.0 MALIGNANT NEOPLASM OF LOWER-OUTER QUADRANT OF RIGHT BREAST OF FEMALE, ESTROGEN RECEPTOR POSITIVE (HCC): Primary | ICD-10-CM

## 2020-11-18 DIAGNOSIS — C50.511 MALIGNANT NEOPLASM OF LOWER-OUTER QUADRANT OF RIGHT BREAST OF FEMALE, ESTROGEN RECEPTOR POSITIVE (HCC): Primary | ICD-10-CM

## 2020-11-18 RX ORDER — DULOXETIN HYDROCHLORIDE 30 MG/1
CAPSULE, DELAYED RELEASE ORAL
Qty: 10 CAP | Refills: 0 | Status: SHIPPED | OUTPATIENT
Start: 2020-11-18 | End: 2022-04-11

## 2020-11-18 NOTE — PROGRESS NOTES
11/18/2020 5:10 PM: Discussed weaning patient off Cymbalta with Nyoka Goltz, NP. Received verbal order from NP to send a new prescription for Cymbalta 30 mg with patient instructions to take one capsule by mouth daily for one week, followed by one capsule by mouth every other day for one week. Patient notified via [employer] and prescription was sent electronically to patient's preferred [address].

## 2020-12-04 ENCOUNTER — TRANSCRIBE ORDER (OUTPATIENT)
Dept: SCHEDULING | Age: 55
End: 2020-12-04

## 2020-12-04 DIAGNOSIS — Z12.31 BREAST CANCER SCREENING BY MAMMOGRAM: Primary | ICD-10-CM

## 2020-12-16 ENCOUNTER — TRANSCRIBE ORDER (OUTPATIENT)
Dept: SCHEDULING | Age: 55
End: 2020-12-16

## 2020-12-16 DIAGNOSIS — Z12.31 VISIT FOR SCREENING MAMMOGRAM: Primary | ICD-10-CM

## 2021-01-13 ENCOUNTER — VIRTUAL VISIT (OUTPATIENT)
Dept: ONCOLOGY | Age: 56
End: 2021-01-13
Payer: COMMERCIAL

## 2021-01-13 DIAGNOSIS — C50.511 MALIGNANT NEOPLASM OF LOWER-OUTER QUADRANT OF RIGHT BREAST OF FEMALE, ESTROGEN RECEPTOR POSITIVE (HCC): Primary | ICD-10-CM

## 2021-01-13 DIAGNOSIS — Z17.0 MALIGNANT NEOPLASM OF LOWER-OUTER QUADRANT OF RIGHT BREAST OF FEMALE, ESTROGEN RECEPTOR POSITIVE (HCC): Primary | ICD-10-CM

## 2021-01-13 PROCEDURE — 99214 OFFICE O/P EST MOD 30 MIN: CPT | Performed by: INTERNAL MEDICINE

## 2021-01-13 NOTE — PROGRESS NOTES
[facility] [place] at [address], [address]  W: [phone]  F: [phone]        Reason for Visit:   Cherelle Woodruff is a 60 y.o. female who is seen by synchronous (real-time) audio-video technology for follow up of breast cancer    Breast surgeon:  Dr. Ana Contreras physician:  Dr. Toño Yeh    Treatment History:   · 12/10/18 right breast US bx:  IMC, gr 1, 0.12 cm (1.2 mm); insufficient for receptors  · 1/25/19 right breast core bx:  11:00 lobular intraepithelial neoplasia; right breast core bx 7:00:  DCIS, gr 2-3, cribriform, 1.4 cm, ER + at 94%, [place] + at 54%  · 2/28/19 right mastectomy : LOQ IDC, 1.4 cm, ER + at 89%, [place] + at 78%, HER 2 POSITIVE (IHC 2+, FISH ratio 3.3; sig/cell 9.2) ; ki67 22%, gr 2, DCIS present and extensive, 0/2 LN; pT1c pN0 cM0  · Myriad Myrisk negative  · TH 4/22/19-7/10/19  · Outback Herceptin 7/17/19-4/1/20  · Anastrozole-8/1/19-12/18/19  · Exemestane 1/8/2020-    History of Present Illness: An abnormal mammogram 11/2018 led to the pathology above. Interval history:  Had [address], still feels tired; does complain of having a hard time focusing    FH:   Mother with breast cancer at age 45s and 46s; maternal aunt with breast cancer in her 46s; maternal aunt with breast cancer in her 46s; no ovarian, prostate, pancreas cancer    LMP 11/2018, spotty prior to that    Past Medical History:   Diagnosis Date    Adverse effect of anesthesia 1995    difficulty awakening    Breast cancer ([address]     Right 11/2018      Past Surgical History:   Procedure Laterality Date    BREAST SURGERY PROCEDURE UNLISTED Left 1997    lumpectomy no lymph    COLONOSCOPY Left 12/14/2018    COLONOSCOPY performed by Nitin Villareal MD at [place] [id number] Right     12/18    HX BREAST RECONSTRUCTION Left     10/2019    HX MASTECTOMY Right     02/2019    HX ORTHOPAEDIC      foot surgery    IR INSERT TUNL CVC W PORT OVER 5 YEARS 2019    IR REMOVE TUNL CVAD W/O PORT / PUMP  2019      Social History     Tobacco Use    Smoking status: Former Smoker     Packs/day: 1.00     Years: 20.00     Pack years: 20.00     Types: Cigarettes     Quit date: 2004     Years since quittin.7    Smokeless tobacco: Never Used   Substance Use Topics    Alcohol use: Yes     Alcohol/week: 14.0 standard drinks     Types: 14 Glasses of wine per week      Family History   Problem Relation Age of Onset    Breast Cancer Mother         42's 1st time late 52's 2nd time   [address] [place] [address] Franco Cancer Mother         breast CA x2    Hypertension Mother     Heart Disease Father     Cancer Maternal Aunt         breast    Cancer Maternal Aunt         breast    Cancer Maternal Cousin         Breast     Current Outpatient Medications   Medication Sig    DULoxetine (CYMBALTA) 30 mg capsule Take 1 cap by mouth daily for one week, then 1 cap by mouth every other day for one week.  exemestane (AROMASIN) 25 mg tablet TAKE ONE TABLET BY MOUTH EVERY DAY    gabapentin (NEURONTIN) 300 mg capsule Take 1 Cap by mouth nightly. Max Daily Amount: 300 mg.  COQ10, UBIQUINOL, PO Take 10 mL by mouth daily.  COLLAGEN by Does Not Apply route daily.  ascorbic acid, vitamin C, (VITAMIN C) 500 mg tablet Take 1,000 mg by mouth daily.  TURMERIC PO Take 2 Caps by mouth daily.  ALPRAZolam (XANAX) 0.25 mg tablet Take 0.25 mg by mouth.  spironolactone (ALDACTONE) 25 mg tablet Take  by mouth daily.  vit B Cmplx 3-FA-Vit C-Biotin (NEPHRO VINOD RX) 1- mg-mg-mcg tablet Take 1 Tab by mouth daily.  omega 3-dha-epa-fish oil (FISH OIL) 100-160-1,000 mg cap Take 1 Cap by mouth daily. No current facility-administered medications for this visit. Allergies   Allergen Reactions    Ancef [Cefazolin] Hives    Penicillins Hives        Review of Systems: A complete review of systems was obtained, negative except as described above.     Physical Exam:     There were no vitals taken for this visit. ECOG PS: 0  General: alert, cooperative, no distress   Mental  status: normal mood, behavior, speech, dress, motor activity, and thought processes, able to follow commands   HENT: NCAT   Neck: no visualized mass   Resp: no respiratory distress   Neuro: no gross deficits   Skin: no discoloration or lesions of concern on visible areas   Psychiatric: normal affect, consistent with stated mood, no evidence of hallucinations       Due to this being a TeleHealth evaluation (During YRLBO-03 public health emergency), many elements of the physical examination are unable to be assessed. Evaluation of the following organ systems was limited: Vitals/Constitutional/EENT/Resp/CV/GI//MS/Neuro/Skin/Heme-Lymph-Imm. Results:     Lab Results   Component Value Date/Time    WBC 3.7 07/10/2019 09:42 AM    HGB 10.9 (L) 07/10/2019 09:42 AM    HCT 31.1 (L) 07/10/2019 09:42 AM    PLATELET 964 (H) 47/29/1450 09:42 AM    .0 (H) 07/10/2019 09:42 AM    ABS. NEUTROPHILS 1.8 07/10/2019 09:42 AM     Lab Results   Component Value Date/Time    Sodium 138 06/26/2019 09:27 AM    Potassium 3.6 06/26/2019 09:27 AM    Chloride 105 06/26/2019 09:27 AM    CO2 28 06/26/2019 09:27 AM    Glucose 91 06/26/2019 09:27 AM    BUN 10 06/26/2019 09:27 AM    Creatinine 0.73 06/26/2019 09:27 AM    GFR est AA >60 06/26/2019 09:27 AM    GFR est non-AA >60 06/26/2019 09:27 AM    Calcium 9.2 06/26/2019 09:27 AM     Lab Results   Component Value Date/Time    Bilirubin, total 0.7 06/26/2019 09:27 AM    ALT (SGPT) 25 06/26/2019 09:27 AM    Alk. phosphatase 38 (L) 06/26/2019 09:27 AM    Protein, total 6.2 (L) 06/26/2019 09:27 AM    Albumin 3.5 06/26/2019 09:27 AM    Globulin 2.7 06/26/2019 09:27 AM     10/14/19 BUE doppler: negative. Records reviewed and summarized above. Pathology report(s) reviewed above. Radiology report(s) reviewed above. Assessment/plan:   1.  Right LOQ IDC, 1.4 cm, gr 2, 0/2 LN, ER +, [place] +, HER 2 POSITIVE:  Stage IA (both anatomic and prognostic). Likely postmenopausal    TTE on 4/11/19, EF 64%, TTE on 7/9/19, EF 64%, TTE on 10/14/19, EF 62%. TTE on 1/22/2020, EF 60%. Will get another TTE, ordered    Labs reveal she is postmenopausal.     DEXA on 7/29/19 normal.  Started anastrozole 1 mg daily on 8/1/19. AI holiday started 12/18/19 for 3 weeks for her joint pain. She states her joint pain improved with AI holiday. Now on exemestane 25 mg daily, continue    Follow-up after early breast cancer was discussed. I recommend follow-up as defined by the American Society of Clinical Oncology and [facility]. This includes a visit to a health care professional every 3-6 months for 3 years, then every 6-12 months for 2 years, and then yearly as well as mammograms yearly. L mammogram scheduled for 1/25/21    2. Emotional well being:  She has excellent support and is coping well with her disease. 3.  Neuropathy:  To both hands, but mostly to right hand. Had carpel tunnel surgery on right hand. Improved. off cymbalta now. 4. Loss of concentration:  May be due to exemestane or chemo; if it does not improve, she will call us and let us know if it does not improve; side effect of treatment      The patient was seen by synchronous (real-time) audio-video technology. I was in the office while conducting this encounter. The patient was at her home. Consent:  She and/or her healthcare decision maker is aware that this patient-initiated Telehealth encounter is a billable service, with coverage as determined by her insurance carrier.  She is aware that she may receive a bill and has provided verbal consent to proceed: Yes    Pursuant to the emergency declaration under the [employer] and the [employer] [place], [employer]5 waiver authority and the [employer] and [employer] Act, this Virtual Visit was conducted, with patient's (and/or legal guardian's) consent, to reduce the patient's risk of exposure to COVID-19 and provide necessary medical care. I appreciate the opportunity to participate in Ms. Paco Moctezuma's care. Signed By: Robson Farmer MD      No orders of the defined types were placed in this encounter.

## 2021-02-02 ENCOUNTER — HOSPITAL ENCOUNTER (OUTPATIENT)
Dept: NON INVASIVE DIAGNOSTICS | Age: 56
Discharge: HOME | End: 2021-02-02
Attending: INTERNAL MEDICINE
Payer: COMMERCIAL

## 2021-02-02 VITALS
DIASTOLIC BLOOD PRESSURE: 78 MMHG | SYSTOLIC BLOOD PRESSURE: 117 MMHG | BODY MASS INDEX: 23.63 KG/M2 | WEIGHT: 147 LBS | HEIGHT: 66 IN

## 2021-02-02 DIAGNOSIS — Z17.0 MALIGNANT NEOPLASM OF LOWER-OUTER QUADRANT OF RIGHT BREAST OF FEMALE, ESTROGEN RECEPTOR POSITIVE (HCC): ICD-10-CM

## 2021-02-02 DIAGNOSIS — C50.511 MALIGNANT NEOPLASM OF LOWER-OUTER QUADRANT OF RIGHT BREAST OF FEMALE, ESTROGEN RECEPTOR POSITIVE (HCC): ICD-10-CM

## 2021-02-02 LAB
ECHO AO ROOT DIAM: 3.03 CM
ECHO AV AREA PEAK VELOCITY: 3.14 CM2
ECHO AV AREA VTI: 2.78 CM2
ECHO AV AREA/BSA PEAK VELOCITY: 1.8 CM2/M2
ECHO AV AREA/BSA VTI: 1.6 CM2/M2
ECHO AV MEAN GRADIENT: 2.16 MMHG
ECHO AV PEAK GRADIENT: 4.54 MMHG
ECHO AV PEAK VELOCITY: 106.58 CM/S
ECHO AV VTI: 20.67 CM
ECHO LA MAJOR AXIS: 3.08 CM
ECHO LA MINOR AXIS: 1.76 CM
ECHO LA VOL 2C: 35.87 ML (ref 22–52)
ECHO LA VOL 4C: 31.99 ML (ref 22–52)
ECHO LA VOL BP: 38.82 ML (ref 22–52)
ECHO LA VOL/BSA BIPLANE: 22.12 ML/M2 (ref 16–28)
ECHO LA VOLUME INDEX A2C: 20.44 ML/M2 (ref 16–28)
ECHO LA VOLUME INDEX A4C: 18.23 ML/M2 (ref 16–28)
ECHO LV INTERNAL DIMENSION DIASTOLIC: 3.98 CM (ref 3.9–5.3)
ECHO LV INTERNAL DIMENSION SYSTOLIC: 2.89 CM
ECHO LV IVSD: 1.03 CM (ref 0.6–0.9)
ECHO LV MASS 2D: 151.3 G (ref 67–162)
ECHO LV MASS INDEX 2D: 86.2 G/M2 (ref 43–95)
ECHO LV POSTERIOR WALL DIASTOLIC: 1.24 CM (ref 0.6–0.9)
ECHO LVOT DIAM: 2.24 CM
ECHO LVOT PEAK GRADIENT: 2.9 MMHG
ECHO LVOT PEAK VELOCITY: 85.18 CM/S
ECHO LVOT SV: 57.4 ML
ECHO LVOT VTI: 14.62 CM
ECHO MV A VELOCITY: 48.75 CM/S
ECHO MV E DECELERATION TIME (DT): 206.46 MS
ECHO MV E VELOCITY: 40.07 CM/S
ECHO MV E/A RATIO: 0.82
ECHO PV PEAK INSTANTANEOUS GRADIENT SYSTOLIC: 3.4 MMHG
ECHO RV INTERNAL DIMENSION: 3.82 CM
ECHO TV REGURGITANT MAX VELOCITY: 220.35 CM/S
ECHO TV REGURGITANT PEAK GRADIENT: 19.42 MMHG
LVOT MG: 1.22 MMHG

## 2021-02-02 PROCEDURE — 93306 TTE W/DOPPLER COMPLETE: CPT

## 2021-02-09 ENCOUNTER — HOSPITAL ENCOUNTER (OUTPATIENT)
Dept: MAMMOGRAPHY | Age: 56
Discharge: HOME | End: 2021-02-09
Attending: OBSTETRICS & GYNECOLOGY
Payer: COMMERCIAL

## 2021-02-09 DIAGNOSIS — Z12.31 VISIT FOR SCREENING MAMMOGRAM: ICD-10-CM

## 2021-02-09 PROCEDURE — 77063 BREAST TOMOSYNTHESIS BI: CPT

## 2021-06-10 NOTE — PERIOP NOTES
Initial RN admission and assessment performed and documented in Endoscopy navigator. Patient evaluated by anesthesia in pre-procedure holding. All procedural vital signs, airway assessment, and level of consciousness information monitored and recorded by anesthesia staff on the anesthesia record. Report received from CRNA post procedure. Patient transported to recovery area by RN. Endoscope was pre-cleaned at bedside immediately following procedure by RADHA Cannon.
Patient has been evaluated by anesthesia pre-procedure. Patient alert and oriented. Vital signs will not be charted by the Endoscopy nurse. All vitals, airway, and loc are monitored by anesthesia staff throughout procedure.
prince all pertinent systems normal

## 2021-12-09 DIAGNOSIS — C50.511 MALIGNANT NEOPLASM OF LOWER-OUTER QUADRANT OF RIGHT BREAST OF FEMALE, ESTROGEN RECEPTOR POSITIVE (HCC): ICD-10-CM

## 2021-12-09 DIAGNOSIS — Z17.0 MALIGNANT NEOPLASM OF LOWER-OUTER QUADRANT OF RIGHT BREAST OF FEMALE, ESTROGEN RECEPTOR POSITIVE (HCC): ICD-10-CM

## 2021-12-09 RX ORDER — EXEMESTANE 25 MG/1
TABLET ORAL
Qty: 30 TABLET | Refills: 11 | Status: SHIPPED | OUTPATIENT
Start: 2021-12-09

## 2022-01-03 ENCOUNTER — TRANSCRIBE ORDER (OUTPATIENT)
Dept: SCHEDULING | Age: 57
End: 2022-01-03

## 2022-01-03 DIAGNOSIS — Z12.31 SCREENING MAMMOGRAM FOR HIGH-RISK PATIENT: Primary | ICD-10-CM

## 2022-01-05 ENCOUNTER — TRANSCRIBE ORDER (OUTPATIENT)
Dept: SCHEDULING | Age: 57
End: 2022-01-05

## 2022-01-05 DIAGNOSIS — Z80.3 FAMILY HISTORY OF BREAST CANCER: ICD-10-CM

## 2022-01-05 DIAGNOSIS — Z85.3 PERSONAL HISTORY OF BREAST CANCER: ICD-10-CM

## 2022-01-05 DIAGNOSIS — Z12.31 VISIT FOR SCREENING MAMMOGRAM: Primary | ICD-10-CM

## 2022-02-14 ENCOUNTER — HOSPITAL ENCOUNTER (OUTPATIENT)
Dept: MAMMOGRAPHY | Age: 57
Discharge: HOME | End: 2022-02-14
Attending: OBSTETRICS & GYNECOLOGY
Payer: COMMERCIAL

## 2022-02-14 DIAGNOSIS — Z12.31 VISIT FOR SCREENING MAMMOGRAM: ICD-10-CM

## 2022-02-14 DIAGNOSIS — Z85.3 PERSONAL HISTORY OF BREAST CANCER: ICD-10-CM

## 2022-02-14 DIAGNOSIS — Z80.3 FAMILY HISTORY OF BREAST CANCER: ICD-10-CM

## 2022-02-14 PROCEDURE — 77063 BREAST TOMOSYNTHESIS BI: CPT

## 2022-03-20 PROBLEM — Z17.0 MALIGNANT NEOPLASM OF RIGHT BREAST IN FEMALE, ESTROGEN RECEPTOR POSITIVE (HCC): Status: ACTIVE | Noted: 2019-03-26

## 2022-03-20 PROBLEM — C50.911 MALIGNANT NEOPLASM OF RIGHT BREAST IN FEMALE, ESTROGEN RECEPTOR POSITIVE (HCC): Status: ACTIVE | Noted: 2019-03-26

## 2022-03-22 ENCOUNTER — TELEPHONE (OUTPATIENT)
Dept: ONCOLOGY | Age: 57
End: 2022-03-22

## 2022-03-22 NOTE — TELEPHONE ENCOUNTER
Lm with patient to see if she can change her virtual time on 4/11/22 at 3:45pm. She is currently at 4:15pm in a new patient spot. Gave her office number to call back.

## 2022-04-11 ENCOUNTER — VIRTUAL VISIT (OUTPATIENT)
Dept: ONCOLOGY | Age: 57
End: 2022-04-11
Payer: COMMERCIAL

## 2022-04-11 DIAGNOSIS — Z17.0 MALIGNANT NEOPLASM OF LOWER-OUTER QUADRANT OF RIGHT BREAST OF FEMALE, ESTROGEN RECEPTOR POSITIVE (HCC): Primary | ICD-10-CM

## 2022-04-11 DIAGNOSIS — C50.511 MALIGNANT NEOPLASM OF LOWER-OUTER QUADRANT OF RIGHT BREAST OF FEMALE, ESTROGEN RECEPTOR POSITIVE (HCC): Primary | ICD-10-CM

## 2022-04-11 PROCEDURE — 99213 OFFICE O/P EST LOW 20 MIN: CPT | Performed by: INTERNAL MEDICINE

## 2022-04-11 NOTE — PROGRESS NOTES
[facility] [place] at [address], [address] [facility]  W: [phone]  F: [phone]        Reason for Visit:   Isabel Sharma is a 60 y.o. female who is seen by synchronous (real-time) audio-video technology for follow up of breast cancer    Breast surgeon:  Dr. Brisa Mckeon physician:  Dr. 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 Interval history:  No issues with exemestane    FH:   Mother with breast cancer at age 45s and 46s; maternal aunt with breast cancer in her 46s; maternal aunt with breast cancer in her 46s; no ovarian, prostate, pancreas cancer    LMP 11/2018, spotty prior to that    Past Medical History:   Diagnosis Date    Adverse effect of anesthesia 1995    difficulty awakening    Breast cancer ([address]     Right 11/2018      Past Surgical History:   Procedure Laterality Date    COLONOSCOPY Left 12/14/2018    COLONOSCOPY performed by Nakia Lindsey MD at [place] [address] Right     12/18    HX BREAST RECONSTRUCTION Right     10/2019    HX MASTECTOMY Right     02/2019    HX ORTHOPAEDIC      foot surgery    IMPLANT BREAST SILICONE/EQ Bilateral 1302    IR INSERT TUNL CVC W PORT OVER 5 YEARS  4/18/2019    IR REMOVE TUNL CVAD W/O PORT / PUMP  7/19/2019    NM BREAST SURGERY PROCEDURE UNLISTED Left     lumpectomy no lymph      Social History     Tobacco Use    Smoking status: Former Smoker     Packs/day: 1.00     Years: 20.00     Pack years: 20.00     Types: Cigarettes     Quit date: 2004     Years since quittin.0    Smokeless tobacco: Never Used   Substance Use Topics    Alcohol use: Yes     Alcohol/week: 14.0 standard drinks     Types: 14 Glasses of wine per week      Family History   Problem Relation Age of Onset    Breast Cancer Mother         42's 1st time late 52's 2nd time   Julianna Garber Cancer Mother         breast CA x2    Hypertension Mother     Heart Disease Father     Cancer Maternal Aunt         breast    Cancer Maternal Aunt         breast    Cancer Maternal Cousin         Breast     Current Outpatient Medications   Medication Sig    Aromasin 25 mg tablet TAKE ONE TABLET BY MOUTH ONCE DAILY    DULoxetine (CYMBALTA) 30 mg capsule Take 1 cap by mouth daily for one week, then 1 cap by mouth every other day for one week.  gabapentin (NEURONTIN) 300 mg capsule Take 1 Cap by mouth nightly. Max Daily Amount: 300 mg.  COQ10, UBIQUINOL, PO Take 10 mL by mouth daily.  COLLAGEN by Does Not Apply route daily.  ascorbic acid, vitamin C, (VITAMIN C) 500 mg tablet Take 1,000 mg by mouth daily.  TURMERIC PO Take 2 Caps by mouth daily.  ALPRAZolam (XANAX) 0.25 mg tablet Take 0.25 mg by mouth.  spironolactone (ALDACTONE) 25 mg tablet Take  by mouth daily.  vit B Cmplx 3-FA-Vit C-Biotin (NEPHRO VINOD RX) 1- mg-mg-mcg tablet Take 1 Tab by mouth daily.  omega 3-dha-epa-fish oil (FISH OIL) 100-160-1,000 mg cap Take 1 Cap by mouth daily. No current facility-administered medications for this visit. Allergies   Allergen Reactions    Ancef [Cefazolin] Hives    Penicillins Hives        Review of Systems: A complete review of systems was obtained, negative except as described above.     Physical Exam:     There were no vitals taken for this visit. ECOG PS: 0  General: alert, cooperative, no distress   Mental  status: normal mood, behavior, speech, dress, motor activity, and thought processes, able to follow commands   HENT: NCAT   Neck: no visualized mass   Resp: no respiratory distress   Neuro: no gross deficits   Skin: no discoloration or lesions of concern on visible areas   Psychiatric: normal affect, consistent with stated mood, no evidence of hallucinations       Due to this being a TeleHealth evaluation (During [facility]- public health emergency), many elements of the physical examination are unable to be assessed. Evaluation of the following organ systems was limited: Vitals/Constitutional/EENT/Resp/CV/GI//MS/Neuro/Skin/Heme-Lymph-Imm. Results:     Lab Results   Component Value Date/Time    WBC 3.7 07/10/2019 09:42 AM    HGB 10.9 (L) 07/10/2019 09:42 AM    HCT 31.1 (L) 07/10/2019 09:42 AM    PLATELET 523 (H) 65/07/7025 09:42 AM    .0 (H) 07/10/2019 09:42 AM    ABS. NEUTROPHILS 1.8 07/10/2019 09:42 AM     Lab Results   Component Value Date/Time    Sodium 138 06/26/2019 09:27 AM    Potassium 3.6 06/26/2019 09:27 AM    Chloride 105 06/26/2019 09:27 AM    CO2 28 06/26/2019 09:27 AM    Glucose 91 06/26/2019 09:27 AM    BUN 10 06/26/2019 09:27 AM    Creatinine 0.73 06/26/2019 09:27 AM    GFR est AA >60 06/26/2019 09:27 AM    GFR est non-AA >60 06/26/2019 09:27 AM    Calcium 9.2 06/26/2019 09:27 AM     Lab Results   Component Value Date/Time    Bilirubin, total 0.7 06/26/2019 09:27 AM    ALT (SGPT) 25 06/26/2019 09:27 AM    Alk. phosphatase 38 (L) 06/26/2019 09:27 AM    Protein, total 6.2 (L) 06/26/2019 09:27 AM    Albumin 3.5 06/26/2019 09:27 AM    Globulin 2.7 06/26/2019 09:27 AM     10/14/19 BUE doppler: negative. Records reviewed and summarized above. Pathology report(s) reviewed above. Radiology report(s) reviewed above. Assessment/plan:   1.  Right LOQ IDC, 1.4 cm, gr 2, 0/2 LN, ER +, NM +, HER 2 POSITIVE:  Stage IA (both anatomic and prognostic). Likely postmenopausal    TTE on 4/11/19, EF 64%, TTE on 7/9/19, EF 64%, TTE on 10/14/19, EF 62%. TTE on 1/22/2020, EF 60%. Will get another TTE, ordered    Labs reveal she is postmenopausal.     DEXA on 7/29/19 normal.  Started anastrozole 1 mg daily on 8/1/19. AI holiday started 12/18/19 for 3 weeks for her joint pain. She states her joint pain improved with AI holiday. Now on exemestane 25 mg daily, continue    Follow-up after early breast cancer was discussed. I recommend follow-up as defined by the American Society of Clinical Oncology and [facility] [[email or web address]]. This includes a visit to a health care professional every 3-6 months for 3 years, then every 6-12 months for 2 years, and then yearly as well as mammograms yearly. L mammogram negative 2/14/22    2. Emotional well being:  She has excellent support and is coping well with her disease. 3. Loss of concentration:  May be due to exemestane or chemo; mild, still present    The patient was evaluated through a synchronous (real-time) audio-video encounter. The patient (or guardian if applicable) is aware that this is a billable service, which includes applicable co-pays. This Virtual Visit was conducted with patient's (and/or legal guardian's) consent. The visit was conducted pursuant to the emergency declaration under the [facility], [address] [place] [address] authority and the [employer] Act. Patient identification was verified, and a caregiver was present when appropriate. The patient was located in a state where the provider was licensed to provide care. I appreciate the opportunity to participate in Ms. Janeth Moctezuma's care.

## 2022-12-26 DIAGNOSIS — C50.511 MALIGNANT NEOPLASM OF LOWER-OUTER QUADRANT OF RIGHT BREAST OF FEMALE, ESTROGEN RECEPTOR POSITIVE (HCC): ICD-10-CM

## 2022-12-26 DIAGNOSIS — Z17.0 MALIGNANT NEOPLASM OF LOWER-OUTER QUADRANT OF RIGHT BREAST OF FEMALE, ESTROGEN RECEPTOR POSITIVE (HCC): ICD-10-CM

## 2022-12-27 RX ORDER — EXEMESTANE 25 MG/1
TABLET ORAL
Qty: 30 TABLET | Refills: 11 | Status: SHIPPED | OUTPATIENT
Start: 2022-12-27

## 2023-02-23 NOTE — H&P
[address]  [address]  [place], [address]  [phone]                                History and Physical     NAME: Rubi Walker   :  1965   MRN:  [id number]     HPI:  The patient was seen and examined. Past Surgical History:   Procedure Laterality Date    BREAST SURGERY PROCEDURE UNLISTED Left     lumpectomy no lymph    HX ORTHOPAEDIC      foot surgery     Past Medical History:   Diagnosis Date    Adverse effect of anesthesia     difficulty awakening     Social History     Tobacco Use    Smoking status: Former Smoker    Smokeless tobacco: Never Used   Substance Use Topics    Alcohol use: Yes     Alcohol/week: 8.4 oz     Types: 14 Glasses of wine per week    Drug use: No     Allergies   Allergen Reactions    Penicillins Hives     Family History   Problem Relation Age of Onset    Breast Cancer Mother         42's 1st time late 52's 2nd time   [facility] Cancer Mother         breast CA    Hypertension Mother     Heart Disease Father      No current facility-administered medications for this encounter. PHYSICAL EXAM:  General: WD, WN. Alert, cooperative, no acute distress    HEENT: NC, Atraumatic. PERRLA, EOMI. Anicteric sclerae. Lungs:  CTA Bilaterally. No Wheezing/Rhonchi/Rales. Heart:  Regular  rhythm,  No murmur, No Rubs, No Gallops  Abdomen: Soft, Non distended, Non tender.  +Bowel sounds, no HSM  Extremities: No c/c/e  Neurologic:  CN 2-12 gi, Alert and oriented X 3. No acute neurological distress   Psych:   Good insight. Not anxious nor agitated. The heart, lungs and mental status were satisfactory for the administration of MAC sedation and for the procedure.       Mallampati score: 2       Assessment:   · Screening    Plan:   · Endoscopic procedure :cscope  · MAC sedation
Standing/Walking/Toileting

## 2023-02-27 ENCOUNTER — TRANSCRIBE ORDER (OUTPATIENT)
Dept: SCHEDULING | Age: 58
End: 2023-02-27

## 2023-02-27 DIAGNOSIS — Z12.31 VISIT FOR SCREENING MAMMOGRAM: Primary | ICD-10-CM

## 2023-04-22 DIAGNOSIS — Z12.31 VISIT FOR SCREENING MAMMOGRAM: Primary | ICD-10-CM

## 2023-04-24 DIAGNOSIS — Z12.31 VISIT FOR SCREENING MAMMOGRAM: Primary | ICD-10-CM

## 2023-05-22 ENCOUNTER — TELEPHONE (OUTPATIENT)
Age: 58
End: 2023-05-22

## 2023-05-22 RX ORDER — EXEMESTANE 25 MG/1
25 TABLET ORAL DAILY
Qty: 90 TABLET | Refills: 3 | Status: SHIPPED | OUTPATIENT
Start: 2023-05-22

## 2023-05-22 NOTE — TELEPHONE ENCOUNTER
Pt insurance called stating [facility]in [facility].  Insurance does not cover this but does cover the generic if pt can take that    [facility] # [phone]

## 2023-05-23 NOTE — TELEPHONE ENCOUNTER
[address] at [place]  [phone]    05/23/23 5:02 PM - Attempted to call patient back but there was no answer. Responded to patient's [employer] message instead.

## 2023-05-23 NOTE — TELEPHONE ENCOUNTER
Patient called and stated that she believes that the exemestane medication was the medication she started taking at first that gave her side effects. Requested a call back to discuss.          # [phone]

## 2024-03-25 ENCOUNTER — TELEPHONE (OUTPATIENT)
Age: 59
End: 2024-03-25

## 2024-04-01 ENCOUNTER — TELEPHONE (OUTPATIENT)
Age: 59
End: 2024-04-01

## 2024-04-05 ENCOUNTER — TELEPHONE (OUTPATIENT)
Age: 59
End: 2024-04-05

## 2024-04-05 DIAGNOSIS — C50.511 MALIGNANT NEOPLASM OF LOWER-OUTER QUADRANT OF RIGHT BREAST OF FEMALE, ESTROGEN RECEPTOR POSITIVE (HCC): Primary | ICD-10-CM

## 2024-04-05 DIAGNOSIS — Z17.0 MALIGNANT NEOPLASM OF LOWER-OUTER QUADRANT OF RIGHT BREAST OF FEMALE, ESTROGEN RECEPTOR POSITIVE (HCC): Primary | ICD-10-CM

## 2024-04-05 NOTE — TELEPHONE ENCOUNTER
[facility] [place] at [facility]  [phone]    04/05/24 11:16 AM EDT - Called patient to let her know that we put in the order for the mammogram. Provided her with the scheduling team's number. She had no further questions at this time.

## 2024-04-05 NOTE — TELEPHONE ENCOUNTER
Called patient to reschedule 4/9 VV. Patient wanted to know if 3D mammogram orders can be out in so she can go and have it done.       CB# [phone]

## 2024-04-29 RX ORDER — EXEMESTANE 25 MG/1
25 TABLET ORAL DAILY
Qty: 90 TABLET | Refills: 3 | Status: SHIPPED | OUTPATIENT
Start: 2024-04-29

## 2024-04-30 ENCOUNTER — TELEMEDICINE (OUTPATIENT)
Age: 59
End: 2024-04-30

## 2024-04-30 DIAGNOSIS — Z17.0 MALIGNANT NEOPLASM OF LOWER-OUTER QUADRANT OF RIGHT BREAST OF FEMALE, ESTROGEN RECEPTOR POSITIVE (HCC): Primary | ICD-10-CM

## 2024-04-30 DIAGNOSIS — C50.511 MALIGNANT NEOPLASM OF LOWER-OUTER QUADRANT OF RIGHT BREAST OF FEMALE, ESTROGEN RECEPTOR POSITIVE (HCC): Primary | ICD-10-CM

## 2024-04-30 PROCEDURE — 99213 OFFICE O/P EST LOW 20 MIN: CPT | Performed by: INTERNAL MEDICINE

## 2024-04-30 NOTE — PROGRESS NOTES
[facility] [place] at [facility]   [address] [facility] [address], [address] [facility] [address]   W: [phone]  F: [phone]                Reason for Visit:     Scarlet Ordaz is a 58 y.o..  female who is seen by synchronous (real-time) audio-video technology for follow up of breast cancer      Breast surgeon:  Dr. Kwan   Consulting physician:  Dr. Burgess          Treatment History:      12/10/18 right breast US bx:  IMC, gr 1, 0.12 cm (1.2 mm); insufficient for receptors    1/25/19 right breast core bx:  11:00 lobular intraepithelial neoplasia; right breast core bx 7:00:  DCIS, gr 2-3, cribriform, 1.4 cm, ER + at 94%, [place] + at 54%    2/28/19 right mastectomy : LOQ IDC, 1.4 cm, ER + at 89%, [place] + at 78%, HER 2 POSITIVE (IHC 2+, FISH ratio 3.3; sig/cell 9.2) ; ki67 22%, gr 2, DCIS present and extensive, 0/2 LN; pT1c pN0 cM0    Myriad Myrisk negative    TH 4/22/19-7/10/19    Outback Herceptin 7/17/19-4/1/20    Anastrozole-8/1/19-12/18/19    Exemestane 1/8/2020-          History of Present Illness:     An abnormal mammogram 11/2018 led to the pathology above.      Interval history:  No issues with exemestane      FH:  Mother with breast cancer at age 40s and 50s; maternal aunt with breast cancer in her 50s; maternal aunt with breast cancer in her 50s; no ovarian, prostate, pancreas cancer      LMP 11/2018, spotty prior to that      Review of systems was obtained and pertinent findings reviewed above. Past medical history, social history, family history, medications, and allergies are located in the electronic medical record.         Physical Exam:   There were no vitals filed for this visit.     ECOG PS: 0     General: alert, cooperative, no distress   Mental  status: normal mood, behavior, speech, dress, motor activity, and thought processes, able to follow commands   HENT: NCAT   Neck: no visualized mass   Resp: no respiratory distress   Neuro: no gross deficits   Skin: no discoloration or lesions

## 2024-05-31 ENCOUNTER — HOSPITAL ENCOUNTER (OUTPATIENT)
Facility: HOSPITAL | Age: 59
Discharge: HOME | End: 2024-05-31
Attending: INTERNAL MEDICINE
Payer: COMMERCIAL

## 2024-05-31 VITALS — BODY MASS INDEX: 23.14 KG/M2 | WEIGHT: 144 LBS | HEIGHT: 66 IN

## 2024-05-31 DIAGNOSIS — Z17.0 MALIGNANT NEOPLASM OF LOWER-OUTER QUADRANT OF RIGHT BREAST OF FEMALE, ESTROGEN RECEPTOR POSITIVE (HCC): ICD-10-CM

## 2024-05-31 DIAGNOSIS — C50.511 MALIGNANT NEOPLASM OF LOWER-OUTER QUADRANT OF RIGHT BREAST OF FEMALE, ESTROGEN RECEPTOR POSITIVE (HCC): ICD-10-CM

## 2024-05-31 PROCEDURE — 77063 BREAST TOMOSYNTHESIS BI: CPT

## 2025-03-19 NOTE — PROGRESS NOTES
[facility] VISIT NOTE    0900  Pt arrived at [facility] ambulatory and in no distress for C1 D8 Taxol/ Herceptin. Assessment completed, pt c/o right arm pain 3/10    Right chest port accessed with . 75 in fall with no difficulty. Positive blood return noted and labs drawn. Medications Administered     0.9% sodium chloride infusion     Admin Date  05/01/2019 Action  New Bag Dose  25 mL/hr Rate  25 mL/hr Route  IntraVENous Administered By  Sachin Sidle          dexamethasone (DECADRON) injection 10 mg     Admin Date  05/01/2019 Action  Given Dose  10 mg Route  IntraVENous Administered By  Sachin Sidle          diphenhydrAMINE (BENADRYL) injection 50 mg     Admin Date  05/01/2019 Action  Given Dose  50 mg Route  IntraVENous Administered By  Sachin Sidle          famotidine (PF) (PEPCID) 20 mg in sodium chloride 0.9% 10 mL injection     Admin Date  05/01/2019 Action  Given Dose  20 mg Route  IntraVENous Administered By  [place] Sidle          PACLitaxel (TAXOL) 139 mg in 0.9% sodium chloride 250 mL, overfill volume 25 mL chemo infusion     Admin Date  05/01/2019 Action  New Bag Dose  139 mg Rate  298.2 mL/hr Route  IntraVENous Administered By  [place] Sidle          trastuzumab (HERCEPTIN) 130 mg in 0.9% sodium chloride 250 mL, overfill volume 25 mL IVPB     Admin Date  05/01/2019 Action  New Bag Dose  130 mg Rate  562 mL/hr Route  IntraVENous Administered By  Arn Cos treatment well, no adverse reaction noted. Port de-accessed and flushed per protocol. Positive blood return noted.   Patient Vitals for the past 12 hrs:   Temp Pulse Resp BP SpO2   05/01/19 1415 98.2 °F (36.8 °C) 75 18 130/73 --   05/01/19 0905 98.5 °F (36.9 °C) 83 18 116/61 100 %     Recent Results (from the past 12 hour(s))   CBC WITH AUTOMATED DIFF    Collection Time: 05/01/19  9:19 AM   Result Value Ref Range    WBC 4.9 3.6 - 11.0 K/uL    RBC 3.88 3.80 - 5.20 M/uL    HGB 11.7 11.5 - 16.0 g/dL    HCT 35.7 35.0 - 47.0 %    MCV 92.0 80.0 - 99.0 FL    MCH 30.2 26.0 - 34.0 PG    MCHC 32.8 30.0 - 36.5 g/dL    RDW 13.0 11.5 - 14.5 %    PLATELET 492 176 - 723 K/uL    MPV 9.1 8.9 - 12.9 FL    NRBC 0.0 0  WBC    ABSOLUTE NRBC 0.00 0.00 - 0.01 K/uL    NEUTROPHILS 45 32 - 75 %    LYMPHOCYTES 47 12 - 49 %    MONOCYTES 5 5 - 13 %    EOSINOPHILS 2 0 - 7 %    BASOPHILS 1 0 - 1 %    IMMATURE GRANULOCYTES 0 0.0 - 0.5 %    ABS. NEUTROPHILS 2.2 1.8 - 8.0 K/UL    ABS. LYMPHOCYTES 2.4 0.8 - 3.5 K/UL    ABS. MONOCYTES 0.2 0.0 - 1.0 K/UL    ABS. EOSINOPHILS 0.1 0.0 - 0.4 K/UL    ABS. BASOPHILS 0.0 0.0 - 0.1 K/UL    ABS. IMM. GRANS. 0.0 0.00 - 0.04 K/UL    DF AUTOMATED       1415  D/C'd from [facility] ambulatory and in no distress accompanied by friend.  Next appointment is 5/8/19 at 0900 [General Appearance - Well Developed] : well developed [General Appearance - Well Nourished] : well nourished [Heart Rate And Rhythm] : heart rate and rhythm were normal [] : no respiratory distress [Abdomen Soft] : soft [Normal Station and Gait] : the gait and station were normal for the patient's age [Skin Turgor] : supple [No Focal Deficits] : no focal deficits [Oriented To Time, Place, And Person] : oriented to person, place, and time

## 2025-04-25 ENCOUNTER — TELEPHONE (OUTPATIENT)
Age: 60
End: 2025-04-25

## 2025-04-25 RX ORDER — EXEMESTANE 25 MG/1
25 TABLET ORAL DAILY
Qty: 30 TABLET | Refills: 0 | Status: SHIPPED | OUTPATIENT
Start: 2025-04-25

## 2025-05-14 ENCOUNTER — TRANSCRIBE ORDERS (OUTPATIENT)
Facility: HOSPITAL | Age: 60
End: 2025-05-14

## 2025-05-14 DIAGNOSIS — E28.39 ESTROGEN DEFICIENCY: ICD-10-CM

## 2025-05-14 DIAGNOSIS — Z85.3 PERSONAL HISTORY OF BREAST CANCER: ICD-10-CM

## 2025-05-14 DIAGNOSIS — Z78.0 POSTMENOPAUSAL: Primary | ICD-10-CM

## 2025-05-15 ENCOUNTER — TRANSCRIBE ORDERS (OUTPATIENT)
Facility: HOSPITAL | Age: 60
End: 2025-05-15

## 2025-05-15 DIAGNOSIS — Z12.31 VISIT FOR SCREENING MAMMOGRAM: Primary | ICD-10-CM

## 2025-06-09 NOTE — PRE-PROCEDURE INSTRUCTIONS
6/9/2025  4:00 p.m.  Spoke with patient and confirmed arrival time of 7:45 a.m. for appointments scheduled on 6/11/2025; instructions given.

## 2025-06-11 ENCOUNTER — HOSPITAL ENCOUNTER (OUTPATIENT)
Facility: HOSPITAL | Age: 60
Discharge: HOME | End: 2025-06-14
Payer: COMMERCIAL

## 2025-06-11 VITALS — BODY MASS INDEX: 23.14 KG/M2 | HEIGHT: 66 IN | WEIGHT: 144 LBS

## 2025-06-11 DIAGNOSIS — Z12.31 VISIT FOR SCREENING MAMMOGRAM: ICD-10-CM

## 2025-06-11 DIAGNOSIS — Z78.0 POSTMENOPAUSAL: ICD-10-CM

## 2025-06-11 DIAGNOSIS — Z85.3 PERSONAL HISTORY OF BREAST CANCER: ICD-10-CM

## 2025-06-11 DIAGNOSIS — E28.39 ESTROGEN DEFICIENCY: ICD-10-CM

## 2025-06-11 PROCEDURE — 77063 BREAST TOMOSYNTHESIS BI: CPT

## 2025-06-11 PROCEDURE — 77080 DXA BONE DENSITY AXIAL: CPT

## 2025-08-15 RX ORDER — EXEMESTANE 25 MG/1
25 TABLET ORAL DAILY
Qty: 30 TABLET | Refills: 0 | Status: SHIPPED | OUTPATIENT
Start: 2025-08-15

## (undated) DEVICE — Z DISCONTINUED USE 2751540 TUBING IRRIG L10IN DISP PMP ENDOGATOR

## (undated) DEVICE — SNARE ENDOSCP M L240CM W27MM SHTH DIA2.4MM CHN 2.8MM OVL

## (undated) DEVICE — BW-412T DISP COMBO CLEANING BRUSH: Brand: SINGLE USE COMBINATION CLEANING BRUSH

## (undated) DEVICE — ENDO CARRY-ON PROCEDURE KIT INCLUDES ENZYMATIC SPONGE, GAUZE, BIOHAZARD LABEL, TRAY, LUBRICANT, DIRTY SCOPE LABEL, WATER LABEL, TRAY, DRAWSTRING PAD, AND DEFENDO 4-PIECE KIT.: Brand: ENDO CARRY-ON PROCEDURE KIT

## (undated) DEVICE — SOLIDIFIER FLUID 3000 CC ABSORB

## (undated) DEVICE — BAG BELONG PT PERS CLEAR HANDL

## (undated) DEVICE — Device: Brand: MEDICAL ACTION INDUSTRIES

## (undated) DEVICE — SYRINGE MED 20ML STD CLR PLAS LUERLOCK TIP N CTRL DISP

## (undated) DEVICE — 1200 GUARD II KIT W/5MM TUBE W/O VAC TUBE: Brand: GUARDIAN

## (undated) DEVICE — AIRLIFE™ U/CONNECT-IT OXYGEN TUBING 7 FEET (2.1 M) CRUSH-RESISTANT OXYGEN TUBING, VINYL TIPPED: Brand: AIRLIFE™

## (undated) DEVICE — KENDALL RADIOLUCENT FOAM MONITORING ELECTRODE -RECTANGULAR SHAPE: Brand: KENDALL

## (undated) DEVICE — CONNECTOR TBNG AUX H2O JET DISP FOR OLY 160/180 SER

## (undated) DEVICE — CATH IV AUTOGRD BC BLU 22GA 25 -- INSYTE

## (undated) DEVICE — SET ADMIN 16ML TBNG L100IN 2 Y INJ SITE IV PIGGY BK DISP

## (undated) DEVICE — SYR 50ML SLIP TIP NSAF LF STRL --

## (undated) DEVICE — STRAINER URIN CALC RNL MSH -- CONVERT TO ITEM 357634

## (undated) DEVICE — Z DISCONTINUED NO SUB IDED SET EXTN W/ 4 W STPCOCK M SPIN LOK 36IN

## (undated) DEVICE — Device

## (undated) DEVICE — QUILTED PREMIUM COMFORT UNDERPAD,EXTRA HEAVY: Brand: WINGS

## (undated) DEVICE — NEEDLE HYPO 18GA L1.5IN PNK S STL HUB POLYPR SHLD REG BVL

## (undated) DEVICE — BAG SPEC BIOHZD LF 2MIL 6X10IN -- CONVERT TO ITEM 357326

## (undated) DEVICE — NEONATAL-ADULT SPO2 SENSOR: Brand: NELLCOR

## (undated) DEVICE — CONTAINER SPEC 20 ML LID NEUT BUFF FORMALIN 10 % POLYPR STS

## (undated) DEVICE — CANN NASAL O2 CAPNOGRAPHY AD -- FILTERLINE

## (undated) DEVICE — TRAP SURG QUAD PARABOLA SLOT DSGN SFTY SCRN TRAPEASE